# Patient Record
Sex: MALE | Race: WHITE | NOT HISPANIC OR LATINO | URBAN - METROPOLITAN AREA
[De-identification: names, ages, dates, MRNs, and addresses within clinical notes are randomized per-mention and may not be internally consistent; named-entity substitution may affect disease eponyms.]

---

## 2017-08-22 ENCOUNTER — INPATIENT (INPATIENT)
Facility: HOSPITAL | Age: 62
LOS: 3 days | Discharge: HOME | End: 2017-08-26
Attending: INTERNAL MEDICINE | Admitting: FAMILY MEDICINE
Payer: MEDICARE

## 2017-08-22 VITALS
RESPIRATION RATE: 18 BRPM | TEMPERATURE: 98 F | DIASTOLIC BLOOD PRESSURE: 68 MMHG | OXYGEN SATURATION: 97 % | HEART RATE: 59 BPM | SYSTOLIC BLOOD PRESSURE: 161 MMHG

## 2017-08-22 DIAGNOSIS — F11.23 OPIOID DEPENDENCE WITH WITHDRAWAL: ICD-10-CM

## 2017-08-22 DIAGNOSIS — I27.2 OTHER SECONDARY PULMONARY HYPERTENSION: ICD-10-CM

## 2017-08-22 DIAGNOSIS — F41.3 OTHER MIXED ANXIETY DISORDERS: ICD-10-CM

## 2017-08-22 LAB
ALBUMIN SERPL ELPH-MCNC: 2.9 G/DL — LOW (ref 3.3–5)
ALP SERPL-CCNC: 85 U/L — SIGNIFICANT CHANGE UP (ref 40–120)
ALT FLD-CCNC: 27 U/L — SIGNIFICANT CHANGE UP (ref 12–78)
ANION GAP SERPL CALC-SCNC: 2 MMOL/L — LOW (ref 5–17)
APTT BLD: 32.3 SEC — SIGNIFICANT CHANGE UP (ref 27.5–37.4)
AST SERPL-CCNC: 14 U/L — LOW (ref 15–37)
BASE EXCESS BLDA CALC-SCNC: 11 MMOL/L — HIGH (ref -2–2)
BASOPHILS # BLD AUTO: 0.1 K/UL — SIGNIFICANT CHANGE UP (ref 0–0.2)
BASOPHILS NFR BLD AUTO: 0.9 % — SIGNIFICANT CHANGE UP (ref 0–2)
BILIRUB SERPL-MCNC: 0.3 MG/DL — SIGNIFICANT CHANGE UP (ref 0.2–1.2)
BLOOD GAS COMMENTS ARTERIAL: SIGNIFICANT CHANGE UP
BUN SERPL-MCNC: 16 MG/DL — SIGNIFICANT CHANGE UP (ref 7–23)
CALCIUM SERPL-MCNC: 9.1 MG/DL — SIGNIFICANT CHANGE UP (ref 8.5–10.1)
CHLORIDE SERPL-SCNC: 98 MMOL/L — SIGNIFICANT CHANGE UP (ref 96–108)
CO2 SERPL-SCNC: 40 MMOL/L — HIGH (ref 22–31)
CREAT SERPL-MCNC: 0.47 MG/DL — LOW (ref 0.5–1.3)
D DIMER BLD IA.RAPID-MCNC: 231 NG/ML DDU — HIGH
EOSINOPHIL # BLD AUTO: 0.2 K/UL — SIGNIFICANT CHANGE UP (ref 0–0.5)
EOSINOPHIL NFR BLD AUTO: 2.4 % — SIGNIFICANT CHANGE UP (ref 0–6)
GLUCOSE SERPL-MCNC: 99 MG/DL — SIGNIFICANT CHANGE UP (ref 70–99)
HCO3 BLDA-SCNC: 38 MMOL/L — HIGH (ref 21–29)
HCT VFR BLD CALC: 29.7 % — LOW (ref 39–50)
HGB BLD-MCNC: 9.4 G/DL — LOW (ref 13–17)
INR BLD: 1.02 RATIO — SIGNIFICANT CHANGE UP (ref 0.88–1.16)
LYMPHOCYTES # BLD AUTO: 0.9 K/UL — LOW (ref 1–3.3)
LYMPHOCYTES # BLD AUTO: 13.3 % — SIGNIFICANT CHANGE UP (ref 13–44)
MCHC RBC-ENTMCNC: 26.4 PG — LOW (ref 27–34)
MCHC RBC-ENTMCNC: 31.8 GM/DL — LOW (ref 32–36)
MCV RBC AUTO: 83 FL — SIGNIFICANT CHANGE UP (ref 80–100)
MONOCYTES # BLD AUTO: 0.4 K/UL — SIGNIFICANT CHANGE UP (ref 0–0.9)
MONOCYTES NFR BLD AUTO: 5.3 % — SIGNIFICANT CHANGE UP (ref 2–14)
NEUTROPHILS # BLD AUTO: 5.4 K/UL — SIGNIFICANT CHANGE UP (ref 1.8–7.4)
NEUTROPHILS NFR BLD AUTO: 78.1 % — HIGH (ref 43–77)
PCO2 BLDA: 69 MMHG — HIGH (ref 32–46)
PH BLDA: 7.36 — SIGNIFICANT CHANGE UP (ref 7.35–7.45)
PLATELET # BLD AUTO: 158 K/UL — SIGNIFICANT CHANGE UP (ref 150–400)
PO2 BLDA: 77 — SIGNIFICANT CHANGE UP
POTASSIUM SERPL-MCNC: 3.8 MMOL/L — SIGNIFICANT CHANGE UP (ref 3.5–5.3)
POTASSIUM SERPL-SCNC: 3.8 MMOL/L — SIGNIFICANT CHANGE UP (ref 3.5–5.3)
PROT SERPL-MCNC: 6.4 GM/DL — SIGNIFICANT CHANGE UP (ref 6–8.3)
PROTHROM AB SERPL-ACNC: 11 SEC — SIGNIFICANT CHANGE UP (ref 9.8–12.7)
RBC # BLD: 3.58 M/UL — LOW (ref 4.2–5.8)
RBC # FLD: 14.4 % — SIGNIFICANT CHANGE UP (ref 10.3–14.5)
SAO2 % BLDA: 95 — SIGNIFICANT CHANGE UP
SODIUM SERPL-SCNC: 140 MMOL/L — SIGNIFICANT CHANGE UP (ref 135–145)
TROPONIN I SERPL-MCNC: <0.015 NG/ML — SIGNIFICANT CHANGE UP (ref 0.01–0.04)
WBC # BLD: 6.9 K/UL — SIGNIFICANT CHANGE UP (ref 3.8–10.5)
WBC # FLD AUTO: 6.9 K/UL — SIGNIFICANT CHANGE UP (ref 3.8–10.5)

## 2017-08-22 PROCEDURE — 99285 EMERGENCY DEPT VISIT HI MDM: CPT

## 2017-08-22 PROCEDURE — 71275 CT ANGIOGRAPHY CHEST: CPT | Mod: 26

## 2017-08-22 PROCEDURE — 71010: CPT | Mod: 26

## 2017-08-22 PROCEDURE — 93970 EXTREMITY STUDY: CPT | Mod: 26

## 2017-08-22 PROCEDURE — 93010 ELECTROCARDIOGRAM REPORT: CPT

## 2017-08-22 RX ORDER — FUROSEMIDE 40 MG
40 TABLET ORAL DAILY
Qty: 0 | Refills: 0 | Status: DISCONTINUED | OUTPATIENT
Start: 2017-08-22 | End: 2017-08-26

## 2017-08-22 RX ORDER — SENNA PLUS 8.6 MG/1
2 TABLET ORAL AT BEDTIME
Qty: 0 | Refills: 0 | Status: DISCONTINUED | OUTPATIENT
Start: 2017-08-22 | End: 2017-08-26

## 2017-08-22 RX ORDER — ASPIRIN/CALCIUM CARB/MAGNESIUM 324 MG
1 TABLET ORAL
Qty: 0 | Refills: 0 | COMMUNITY

## 2017-08-22 RX ORDER — SERTRALINE 25 MG/1
100 TABLET, FILM COATED ORAL
Qty: 0 | Refills: 0 | Status: DISCONTINUED | OUTPATIENT
Start: 2017-08-22 | End: 2017-08-26

## 2017-08-22 RX ORDER — FOLIC ACID 0.8 MG
1 TABLET ORAL
Qty: 0 | Refills: 0 | COMMUNITY

## 2017-08-22 RX ORDER — IPRATROPIUM/ALBUTEROL SULFATE 18-103MCG
3 AEROSOL WITH ADAPTER (GRAM) INHALATION EVERY 6 HOURS
Qty: 0 | Refills: 0 | Status: DISCONTINUED | OUTPATIENT
Start: 2017-08-22 | End: 2017-08-26

## 2017-08-22 RX ORDER — SODIUM CHLORIDE 9 MG/ML
3 INJECTION INTRAMUSCULAR; INTRAVENOUS; SUBCUTANEOUS ONCE
Qty: 0 | Refills: 0 | Status: COMPLETED | OUTPATIENT
Start: 2017-08-22 | End: 2017-08-22

## 2017-08-22 RX ORDER — ACETAMINOPHEN 500 MG
650 TABLET ORAL EVERY 6 HOURS
Qty: 0 | Refills: 0 | Status: DISCONTINUED | OUTPATIENT
Start: 2017-08-22 | End: 2017-08-26

## 2017-08-22 RX ORDER — MORPHINE SULFATE 50 MG/1
75 CAPSULE, EXTENDED RELEASE ORAL ONCE
Qty: 0 | Refills: 0 | Status: DISCONTINUED | OUTPATIENT
Start: 2017-08-22 | End: 2017-08-22

## 2017-08-22 RX ORDER — LOSARTAN POTASSIUM 100 MG/1
25 TABLET, FILM COATED ORAL DAILY
Qty: 0 | Refills: 0 | Status: DISCONTINUED | OUTPATIENT
Start: 2017-08-22 | End: 2017-08-26

## 2017-08-22 RX ORDER — SERTRALINE 25 MG/1
1 TABLET, FILM COATED ORAL
Qty: 60 | Refills: 0 | COMMUNITY

## 2017-08-22 RX ORDER — ASPIRIN/CALCIUM CARB/MAGNESIUM 324 MG
81 TABLET ORAL DAILY
Qty: 0 | Refills: 0 | Status: DISCONTINUED | OUTPATIENT
Start: 2017-08-23 | End: 2017-08-26

## 2017-08-22 RX ORDER — ASPIRIN/CALCIUM CARB/MAGNESIUM 324 MG
325 TABLET ORAL DAILY
Qty: 0 | Refills: 0 | Status: DISCONTINUED | OUTPATIENT
Start: 2017-08-22 | End: 2017-08-22

## 2017-08-22 RX ORDER — ASPIRIN/CALCIUM CARB/MAGNESIUM 324 MG
243 TABLET ORAL ONCE
Qty: 0 | Refills: 0 | Status: COMPLETED | OUTPATIENT
Start: 2017-08-22 | End: 2017-08-22

## 2017-08-22 RX ORDER — DOCUSATE SODIUM 100 MG
100 CAPSULE ORAL THREE TIMES A DAY
Qty: 0 | Refills: 0 | Status: DISCONTINUED | OUTPATIENT
Start: 2017-08-22 | End: 2017-08-26

## 2017-08-22 RX ORDER — MORPHINE SULFATE 50 MG/1
75 CAPSULE, EXTENDED RELEASE ORAL THREE TIMES A DAY
Qty: 0 | Refills: 0 | Status: DISCONTINUED | OUTPATIENT
Start: 2017-08-22 | End: 2017-08-26

## 2017-08-22 RX ORDER — IPRATROPIUM/ALBUTEROL SULFATE 18-103MCG
3 AEROSOL WITH ADAPTER (GRAM) INHALATION ONCE
Qty: 0 | Refills: 0 | Status: COMPLETED | OUTPATIENT
Start: 2017-08-22 | End: 2017-08-22

## 2017-08-22 RX ORDER — ALPRAZOLAM 0.25 MG
0.25 TABLET ORAL THREE TIMES A DAY
Qty: 0 | Refills: 0 | Status: DISCONTINUED | OUTPATIENT
Start: 2017-08-22 | End: 2017-08-26

## 2017-08-22 RX ORDER — ALBUTEROL 90 UG/1
2.5 AEROSOL, METERED ORAL
Qty: 0 | Refills: 0 | Status: DISCONTINUED | OUTPATIENT
Start: 2017-08-22 | End: 2017-08-26

## 2017-08-22 RX ORDER — ENOXAPARIN SODIUM 100 MG/ML
40 INJECTION SUBCUTANEOUS EVERY 24 HOURS
Qty: 0 | Refills: 0 | Status: DISCONTINUED | OUTPATIENT
Start: 2017-08-22 | End: 2017-08-26

## 2017-08-22 RX ORDER — FOLIC ACID 0.8 MG
1 TABLET ORAL DAILY
Qty: 0 | Refills: 0 | Status: DISCONTINUED | OUTPATIENT
Start: 2017-08-22 | End: 2017-08-26

## 2017-08-22 RX ORDER — ASPIRIN/CALCIUM CARB/MAGNESIUM 324 MG
243 TABLET ORAL ONCE
Qty: 0 | Refills: 0 | Status: DISCONTINUED | OUTPATIENT
Start: 2017-08-22 | End: 2017-08-22

## 2017-08-22 RX ORDER — MONTELUKAST 4 MG/1
10 TABLET, CHEWABLE ORAL AT BEDTIME
Qty: 0 | Refills: 0 | Status: DISCONTINUED | OUTPATIENT
Start: 2017-08-22 | End: 2017-08-26

## 2017-08-22 RX ADMIN — SERTRALINE 100 MILLIGRAM(S): 25 TABLET, FILM COATED ORAL at 17:14

## 2017-08-22 RX ADMIN — LOSARTAN POTASSIUM 25 MILLIGRAM(S): 100 TABLET, FILM COATED ORAL at 15:41

## 2017-08-22 RX ADMIN — Medication 0.25 MILLIGRAM(S): at 15:41

## 2017-08-22 RX ADMIN — MORPHINE SULFATE 75 MILLIGRAM(S): 50 CAPSULE, EXTENDED RELEASE ORAL at 23:15

## 2017-08-22 RX ADMIN — Medication 1 MILLIGRAM(S): at 15:41

## 2017-08-22 RX ADMIN — Medication 3 MILLILITER(S): at 11:51

## 2017-08-22 RX ADMIN — ENOXAPARIN SODIUM 40 MILLIGRAM(S): 100 INJECTION SUBCUTANEOUS at 15:41

## 2017-08-22 RX ADMIN — Medication 40 MILLIGRAM(S): at 15:41

## 2017-08-22 RX ADMIN — MORPHINE SULFATE 75 MILLIGRAM(S): 50 CAPSULE, EXTENDED RELEASE ORAL at 15:41

## 2017-08-22 RX ADMIN — SODIUM CHLORIDE 3 MILLILITER(S): 9 INJECTION INTRAMUSCULAR; INTRAVENOUS; SUBCUTANEOUS at 09:51

## 2017-08-22 RX ADMIN — Medication 243 MILLIGRAM(S): at 11:51

## 2017-08-22 RX ADMIN — MONTELUKAST 10 MILLIGRAM(S): 4 TABLET, CHEWABLE ORAL at 22:45

## 2017-08-22 RX ADMIN — MORPHINE SULFATE 75 MILLIGRAM(S): 50 CAPSULE, EXTENDED RELEASE ORAL at 22:45

## 2017-08-22 RX ADMIN — Medication 125 MILLIGRAM(S): at 11:51

## 2017-08-22 NOTE — BEHAVIORAL HEALTH ASSESSMENT NOTE - NSBHSUICRISKFACTOR_PSY_A_CORE
Agitation/severe anxiety/Substance abuse/dependence/Chronic pain or acute medical issue/Access to means (pills, firearms, etc.)

## 2017-08-22 NOTE — ED PROVIDER NOTE - PMH
Asthma    Chronic back pain greater than 3 months duration    HTN (hypertension)    Neuritis    GERALD (obstructive sleep apnea)    Osteoarthritis

## 2017-08-22 NOTE — BEHAVIORAL HEALTH ASSESSMENT NOTE - AXIS III
SOB, Chronic back pain, with Laminectomy >10 yrs ago, residual leg pain, disabled (back pain) former desk job , Obesity, GERALD (noncompliant with cpap), Asthma, bilateral THR

## 2017-08-22 NOTE — ED ADULT NURSE NOTE - FALL HARM RISK
coagulation(Bleeding disorder R/T clinical cond/anti-coags) bones(Osteoporosis,prev fx,steroid use,metastatic bone ca

## 2017-08-22 NOTE — ED PROVIDER NOTE - OBJECTIVE STATEMENT
61M HTN, ASA81 asthma/GERALD on home 02 3L. chronic lbp sp left spinal cord stimulator on chronic opioid therapy c/o worsening dyspnea x 3 days and one episode of sscp on getting into car this am. Resolved with rest. No cp currently. Neded 4L during cp. pain was non radiating. aw sob no diaphoresis. BL le edema is at baseline. No fevers or chills. Other than episode pt has had no increased o2 requirements. Pt sleeps in recliner chronically. No PND. Pt states he has felt wheezing but states it is more typical of his panic attacks than his asthma symptoms.   Son at bedside endorses daily panic attacks requiring xanax.   · Primary Care Provider	Dr. Mick Griggs (PMD)  · Care Providers for Follow up (PCP/Outpatient Provider)	Dr. Lambert (pulm)  [facility].    MEDS ASA, morphine, furosemide 40mg twice a day (unchanged), Montelukast, Rescue inhaler (pt hasn't used in 1 year). 61M HTN, ASA81 asthma/GERALD on home 02 3L. chronic lbp sp left spinal cord stimulator on chronic opioid therapy c/o worsening dyspnea x 3 days and one episode of sscp on getting into car this am. Resolved with rest. No cp currently. Neded 4L during cp. pain was non radiating. aw sob no diaphoresis. BL le edema is at baseline. No fevers or chills. Other than episode pt has had no increased o2 requirements. Pt sleeps in recliner chronically. No PND. Pt states he has felt wheezing but states it is more typical of his panic attacks than his asthma symptoms.   Son at bedside endorses daily panic attacks requiring xanax.   · Primary Care Provider	Dr. Mick Griggs (PMD)  · Care Providers for Follow up (PCP/Outpatient Provider)	[place]  [facility].    MEDS ASA, morphine, furosemide 40mg twice a day (unchanged), Montelukast, Rescue inhaler (pt hasn't used in 1 year).

## 2017-08-22 NOTE — H&P ADULT - HISTORY OF PRESENT ILLNESS
61M.  hx chronic LBP due to OA-has been opioid requiring > 10 years.  usual state of health until 1 week ago.  according to patient, Dr. Crocker had tapered his MS Contin 100mg po tid to 60mg po tid.  since, he has become increasingly anxious.  3 days ago, patient developed a cough, jittery, severe anxiety and shortness of breath which has progressed.  This AM, patient had chest pain was was brought to the ED by his two sons.  in ED, was given BD nebs and SoluMedrol.    ROS:  (+) anxiety.  (+) cough-dry and nonproductive.  (-) chest pain at current time.  (-) fever, chills or rigors.  (+) LUTS.  (-) melena, hematochezia, hemoptemesis.  (-) hematuria.  (-) hemoptysis.    PMHx:  hypoxia respiratory failure-O2 dependence;  GERALD;  asthma;  HTN;  "chest congestion and leg swelling" w/o cardiac w/u (denies stress test, angiogram, hx of MI, stents or CABG);  chronic LBP due to severe OA-malfunctioning spinal cord stimulator.    PSHx:  AP;  b/l hip replacement;  lamenectomy.    Rx:  reveiwed.    SocHx:  lives in the community.   (lost his wife ~1 year ago).  no tobacco.  no current EtOH.    FHx:  no 1st degree relative w/ CAD.

## 2017-08-22 NOTE — BEHAVIORAL HEALTH ASSESSMENT NOTE - NSBHCONSULTFOLLOWAFTERCARE_PSY_A_CORE FT
refer to an out pt psych provider via provider list vs [facility] [phone], in addition to bereavement support group

## 2017-08-22 NOTE — ED ADULT NURSE REASSESSMENT NOTE - NS ED NURSE REASSESS COMMENT FT1
pt remains as previously assessed. VS as charted. Report given to JOYA Martinez. Will transport patient with monitor; myself at bedside. Safety and comfort maintained.

## 2017-08-22 NOTE — ED PROVIDER NOTE - ATTENDING CONTRIBUTION TO CARE
Dr. Mustafa: I have personally performed a face to face bedside history and physical examination of this patient. I have discussed the history, examination, review of systems, assessment and plan of management with the resident. I have reviewed the electronic medical record and amended it to reflect my history, review of systems, physical exam, assessment and plan.

## 2017-08-22 NOTE — BEHAVIORAL HEALTH ASSESSMENT NOTE - HPI (INCLUDE ILLNESS QUALITY, SEVERITY, DURATION, TIMING, CONTEXT, MODIFYING FACTORS, ASSOCIATED SIGNS AND SYMPTOMS)
61 yowwm, no formal PPH, h/o Alcohol dependence in remission > 25 yrs after detox, but prescribed Zoloft > 15 yrs ago currently dose is at 100 mg bid, does not remember why initiated, no h/o psych eval, treatment, therapy or SA,  PMH Chronic back pain, with Laminectomy >10 yrs ago, residual leg pain, disabled (back pain) former desk job , Obesity, GERALD (noncompliant with cpap), Asthma, bilateral THR, bib son for SOB, psych referral for anxiety.    Pt reports "panic attack" prior to admission to hospital, triggered by inability to get into jeep on way to hospital, denies depression, endorses sadness and grief since losing  wife Dec., 2016,  report requesting a decrease of pain meds due to feeling overmedicated and wanting quality time with family, prescribed Xanax June 2016 0.25mg prn , but admits to taking regularly bid and recently 3x day.  Pain MD tapered his Ms Contin from 100 mg tid to 60 tid, with reported increased anxiety.    denies vegetative symptoms of depression, some sleep impairment 2ndary to GERALD.  Mood anxious, speech wnl soft, easily engages, denies SIIP, HIIP, psychosis or lacie.

## 2017-08-22 NOTE — BEHAVIORAL HEALTH ASSESSMENT NOTE - NSBHCHARTREVIEWLAB_PSY_A_CORE FT
9.4    6.9   )-----------( 158      ( 22 Aug 2017 10:26 )             29.7   08-22    140  |  98  |  16  ----------------------------<  99  3.8   |  40<H>  |  0.47<L>    Ca    9.1      22 Aug 2017 10:26    TPro  6.4  /  Alb  2.9<L>  /  TBili  0.3  /  DBili  x   /  AST  14<L>  /  ALT  27  /  AlkPhos  85  08-22

## 2017-08-22 NOTE — ED PROVIDER NOTE - MEDICAL DECISION MAKING DETAILS
Chest pain consistent with ACS vs asthma exacerabation and CAD risk factors. Differential also includes obesity hypoventilation syndrome. Pt currently without altered mental status.   1) serial EKGs/CXR/ASA/O2/cardiac monitor/LABS/   2) reassess  3) Admit to telemetry

## 2017-08-22 NOTE — BEHAVIORAL HEALTH ASSESSMENT NOTE - NSBHREFERDETAILS_PSY_A_CORE_FT
61M.  hx chronic LBP due to OA-has been opioid requiring > 10 years.  usual state of health until 1 week ago.  according to patient, Dr. Crocker had tapered his MS Contin 100mg po tid to 60mg po tid.  since, he has become increasingly anxious.  3 days ago, patient developed a cough, jittery, severe anxiety and shortness of breath which has progressed.  This AM, patient had chest pain was was brought to the ED by his two sons.  in ED, was given BD nebs and SoluMedrol.   Psych eval requested due to increasing anxiety.  Pt takes Zoloft 100 mg bid for > 15yrs by PCP.

## 2017-08-22 NOTE — ED PROVIDER NOTE - NS ED ROS FT
No fever/chills, no change in vision, no throat pain, + sob, +cp see hpi, chronic leg swelling or calf pain, no orthopnea, no pnd, no decrease exersized tolerance, no recent travel, no cancer hx, no prior hx of dvt/blood clot,  no abdominal pain, no nausea/vomiting,  no dysuria, no joint pain, no rashes, no focal numbness or weakness, no known mental health issues

## 2017-08-22 NOTE — BEHAVIORAL HEALTH ASSESSMENT NOTE - SUMMARY
61 yowwm, PPH alcohol dependence in remission 25 yrs, Anxiety, Zoloft 100 bid> 10yrs, Xanax 0.25 bid prn, takes 2-3x day, bib son for SOB, dyspnea, which Pt refers to as Panic attack.  Pt reports Opiates tapered down at Pt request, claiming he wanted to be more present with family, less medicated, endorses using prn meds regularly up to 3x day recently, no psych provider or therapy to date.  Pt present with rebound anxiety in context of meds adjustment when Opiates adjusted.  Recommend slower taper, preferable by psych MD, or via inpt detox  with addiction specialist if Pt agreeable.  Difficult balance of pain management with substance abuse history.  Not medication adjustment recommended at this time, suggest out Pt follow up for psych and grief support group via insurance provider list vs [facility] [phone].  Pt is not an imminent danger to self or others and is cleared psychiatrically for discharge.  Case reviewed with Dr De La Torre.

## 2017-08-22 NOTE — H&P ADULT - ASSESSMENT
shortness of breath and anxiety.  hx panic attacks.  -favor opioid withdrawel syndrome due to recent decrease in MS Contin.  -check urine toxicology.  -increase MS Contin 75mg po tid.  -c/w Zoloft and Xanax.  -Psychiatry consult.    hypoxia.  hx GERALD and chronic hypoxic respiratory failure.  hx asthma.  -CXR (-) acute findings.  -ABG.  -D-dimer.  -b/l LE venous dopplers, r/o DVT.  -CTA.  -TTE r/o pHTN.  -has not tolerated BiPAP in the past.  -DUO nebs q6h + DELMI q3h PRN.  -Pulmonary consult.    chest pain.  -telemetry.  -TnI (-).  -EKG (-) acute changes.  -ASA.  -Cardiology consult.    normocytic anemia.  -Fe studies.  -B12.  folate.  -reticulocyte coung.  -stool OB.  -f/u AM CBC.    hx chest congestion and LE edema.  -BNP wnl.  -TTE.  -c/w Lasix.    hx morbid obesity.  -A1C.  -TSH.  -lifestyle modifications.    disposition.  -telemetry unit. shortness of breath and anxiety.  hx panic attacks.  -favor opioid withdrawel syndrome due to recent decrease in MS Contin.  -check urine toxicology.  -increase MS Contin 75mg po tid.  -c/w Zoloft and Xanax.  -awaiting call back from Dr. Antonio Crocker.  message left w/ office staff.  call back requested.  -Psychiatry consult.    hypoxia.  hx GERALD and chronic hypoxic respiratory failure.  hx asthma.  -CXR (-) acute findings.  -ABG.  -D-dimer.  -b/l LE venous dopplers, r/o DVT.  -CTA.  -TTE r/o pHTN.  -has not tolerated BiPAP in the past.  -DUO nebs q6h + DELMI q3h PRN.  -Pulmonary consult.    chest pain.  -telemetry.  -TnI (-).  -EKG (-) acute changes.  -ASA.  -Cardiology consult.    normocytic anemia.  -Fe studies.  -B12.  folate.  -reticulocyte coung.  -stool OB.  -f/u AM CBC.    hx chest congestion and LE edema.  -BNP wnl.  -TTE.  -c/w Lasix.    hx morbid obesity.  -A1C.  -TSH.  -lifestyle modifications.    disposition.  -telemetry unit.

## 2017-08-22 NOTE — BEHAVIORAL HEALTH ASSESSMENT NOTE - NSBHCHARTREVIEWVS_PSY_A_CORE FT
ICU Vital Signs Last 24 Hrs  T(C): 36.7 (22 Aug 2017 18:15), Max: 36.7 (22 Aug 2017 09:49)  T(F): 98.1 (22 Aug 2017 18:15), Max: 98.1 (22 Aug 2017 18:15)  HR: 57 (22 Aug 2017 18:15) (57 - 59)  BP: 145/63 (22 Aug 2017 18:15) (145/63 - 161/68)  BP(mean): 99 (22 Aug 2017 14:38) (99 - 99)  ABP: --  ABP(mean): --  RR: 18 (22 Aug 2017 18:15) (18 - 18)  SpO2: 96% (22 Aug 2017 18:15) (96% - 97%)

## 2017-08-22 NOTE — BEHAVIORAL HEALTH ASSESSMENT NOTE - NSBHSUICPROTECTFACT_PSY_A_CORE
Identifies reasons for living/Supportive social network or family/Future oriented/Responsibility to family and others/Fear of death or dying due to pain/suffering

## 2017-08-23 LAB
AMPHET UR-MCNC: NEGATIVE — SIGNIFICANT CHANGE UP
ANION GAP SERPL CALC-SCNC: -1 MMOL/L — LOW (ref 5–17)
BARBITURATES UR SCN-MCNC: NEGATIVE — SIGNIFICANT CHANGE UP
BENZODIAZ UR-MCNC: POSITIVE — SIGNIFICANT CHANGE UP
BUN SERPL-MCNC: 14 MG/DL — SIGNIFICANT CHANGE UP (ref 7–23)
CALCIUM SERPL-MCNC: 9.1 MG/DL — SIGNIFICANT CHANGE UP (ref 8.5–10.1)
CHLORIDE SERPL-SCNC: 97 MMOL/L — SIGNIFICANT CHANGE UP (ref 96–108)
CO2 SERPL-SCNC: 44 MMOL/L — HIGH (ref 22–31)
COCAINE METAB.OTHER UR-MCNC: NEGATIVE — SIGNIFICANT CHANGE UP
CREAT SERPL-MCNC: 0.45 MG/DL — LOW (ref 0.5–1.3)
FERRITIN SERPL-MCNC: 66 NG/ML — SIGNIFICANT CHANGE UP (ref 30–400)
GLUCOSE SERPL-MCNC: 97 MG/DL — SIGNIFICANT CHANGE UP (ref 70–99)
HBA1C BLD-MCNC: 5.1 % — SIGNIFICANT CHANGE UP (ref 4–5.6)
HCT VFR BLD CALC: 31.2 % — LOW (ref 39–50)
HGB BLD-MCNC: 9.8 G/DL — LOW (ref 13–17)
IRON SATN MFR SERPL: 23 % — SIGNIFICANT CHANGE UP (ref 16–55)
IRON SATN MFR SERPL: 59 UG/DL — SIGNIFICANT CHANGE UP (ref 45–165)
MCHC RBC-ENTMCNC: 25.7 PG — LOW (ref 27–34)
MCHC RBC-ENTMCNC: 31.3 GM/DL — LOW (ref 32–36)
MCV RBC AUTO: 82 FL — SIGNIFICANT CHANGE UP (ref 80–100)
METHADONE UR-MCNC: NEGATIVE — SIGNIFICANT CHANGE UP
OPIATES UR-MCNC: POSITIVE — SIGNIFICANT CHANGE UP
PCP SPEC-MCNC: SIGNIFICANT CHANGE UP
PCP UR-MCNC: NEGATIVE — SIGNIFICANT CHANGE UP
PLATELET # BLD AUTO: 143 K/UL — LOW (ref 150–400)
POTASSIUM SERPL-MCNC: 3.5 MMOL/L — SIGNIFICANT CHANGE UP (ref 3.5–5.3)
POTASSIUM SERPL-SCNC: 3.5 MMOL/L — SIGNIFICANT CHANGE UP (ref 3.5–5.3)
RBC # BLD: 3.75 M/UL — LOW (ref 4.2–5.8)
RBC # BLD: 3.81 M/UL — LOW (ref 4.2–5.8)
RBC # FLD: 14.2 % — SIGNIFICANT CHANGE UP (ref 10.3–14.5)
RETICS #: 64.5 K/UL — SIGNIFICANT CHANGE UP (ref 25–125)
RETICS/RBC NFR: 1.7 % — SIGNIFICANT CHANGE UP (ref 0.5–2.5)
SODIUM SERPL-SCNC: 140 MMOL/L — SIGNIFICANT CHANGE UP (ref 135–145)
THC UR QL: NEGATIVE — SIGNIFICANT CHANGE UP
TIBC SERPL-MCNC: 257 UG/DL — SIGNIFICANT CHANGE UP (ref 220–430)
TSH SERPL-MCNC: 0.84 UIU/ML — SIGNIFICANT CHANGE UP (ref 0.36–3.74)
UIBC SERPL-MCNC: 198 UG/DL — SIGNIFICANT CHANGE UP (ref 110–370)
WBC # BLD: 7.7 K/UL — SIGNIFICANT CHANGE UP (ref 3.8–10.5)
WBC # FLD AUTO: 7.7 K/UL — SIGNIFICANT CHANGE UP (ref 3.8–10.5)

## 2017-08-23 PROCEDURE — 93306 TTE W/DOPPLER COMPLETE: CPT | Mod: 26

## 2017-08-23 RX ORDER — IPRATROPIUM/ALBUTEROL SULFATE 18-103MCG
3 AEROSOL WITH ADAPTER (GRAM) INHALATION ONCE
Qty: 0 | Refills: 0 | Status: COMPLETED | OUTPATIENT
Start: 2017-08-23 | End: 2017-08-23

## 2017-08-23 RX ADMIN — MORPHINE SULFATE 75 MILLIGRAM(S): 50 CAPSULE, EXTENDED RELEASE ORAL at 23:00

## 2017-08-23 RX ADMIN — SERTRALINE 100 MILLIGRAM(S): 25 TABLET, FILM COATED ORAL at 17:26

## 2017-08-23 RX ADMIN — Medication 40 MILLIGRAM(S): at 10:42

## 2017-08-23 RX ADMIN — Medication 1 MILLIGRAM(S): at 12:00

## 2017-08-23 RX ADMIN — Medication 3 MILLILITER(S): at 14:00

## 2017-08-23 RX ADMIN — Medication 81 MILLIGRAM(S): at 12:00

## 2017-08-23 RX ADMIN — Medication 0.25 MILLIGRAM(S): at 23:02

## 2017-08-23 RX ADMIN — Medication 3 MILLILITER(S): at 20:07

## 2017-08-23 RX ADMIN — Medication 0.25 MILLIGRAM(S): at 10:42

## 2017-08-23 RX ADMIN — MORPHINE SULFATE 75 MILLIGRAM(S): 50 CAPSULE, EXTENDED RELEASE ORAL at 14:23

## 2017-08-23 RX ADMIN — LOSARTAN POTASSIUM 25 MILLIGRAM(S): 100 TABLET, FILM COATED ORAL at 05:40

## 2017-08-23 RX ADMIN — SERTRALINE 100 MILLIGRAM(S): 25 TABLET, FILM COATED ORAL at 05:40

## 2017-08-23 RX ADMIN — MORPHINE SULFATE 75 MILLIGRAM(S): 50 CAPSULE, EXTENDED RELEASE ORAL at 05:39

## 2017-08-23 RX ADMIN — MORPHINE SULFATE 75 MILLIGRAM(S): 50 CAPSULE, EXTENDED RELEASE ORAL at 22:26

## 2017-08-23 RX ADMIN — MONTELUKAST 10 MILLIGRAM(S): 4 TABLET, CHEWABLE ORAL at 22:27

## 2017-08-23 RX ADMIN — Medication 100 MILLIGRAM(S): at 22:27

## 2017-08-23 RX ADMIN — Medication 3 MILLILITER(S): at 08:13

## 2017-08-23 RX ADMIN — ENOXAPARIN SODIUM 40 MILLIGRAM(S): 100 INJECTION SUBCUTANEOUS at 14:22

## 2017-08-23 NOTE — CONSULT NOTE ADULT - SUBJECTIVE AND OBJECTIVE BOX
Patient is a 61y old  Male who presents with a chief complaint of shortness of breath and anxiety. (22 Aug 2017 14:38)      HPI:  61M.  hx chronic LBP due to OA-has been opioid requiring > 10 years.  usual state of health until 1 week ago.  according to patient, Dr. Crocker had tapered his MS Contin 100mg po tid to 60mg po tid.  since, he has become increasingly anxious.  3 days ago, patient developed a cough, jittery, severe anxiety and shortness of breath which has progressed.  This AM, patient had chest pain was was brought to the ED by his two sons.  in ED, was given BD nebs and SoluMedrol.    pt is seen and examined on [facility] today  he feels anxious and typically declines any cpap or biapap setting or any form of masks    ROS:  (+) anxiety.  (+) cough-dry and nonproductive.  (-) chest pain at current time.  (-) fever, chills or rigors.  (+) LUTS.  (-) melena, hematochezia, hemoptemesis.  (-) hematuria.  (-) hemoptysis.    PMHx:  hypoxia respiratory failure-O2 dependence;  GERALD;  asthma;  HTN;  "chest congestion and leg swelling" w/o cardiac w/u (denies stress test, angiogram, hx of MI, stents or CABG);  chronic LBP due to severe OA-malfunctioning spinal cord stimulator.    PSHx:  AP;  b/l hip replacement;  lamenectomy.    Rx:  reveiwed.    SocHx:  lives in the community.   (lost his wife ~1 year ago).  no tobacco.  no current EtOH.    FHx:  no 1st degree relative w/ CAD. (22 Aug 2017 14:38)      PAST MEDICAL & SURGICAL HISTORY:  Neuritis  GERALD (obstructive sleep apnea)  Osteoarthritis  Chronic back pain greater than 3 months duration  Asthma  HTN (hypertension)  S/P hip replacement: bilateral  S/P spinal surgery  S/P appendectomy      PREVIOUS DIAGNOSTIC TESTING:      MEDICATIONS  (STANDING):  enoxaparin Injectable 40 milliGRAM(s) SubCutaneous every 24 hours  aspirin enteric coated 81 milliGRAM(s) Oral daily  docusate sodium 100 milliGRAM(s) Oral three times a day  ALBUTerol/ipratropium for Nebulization 3 milliLiter(s) Nebulizer every 6 hours  morphine ER Tablet 75 milliGRAM(s) Oral three times a day  losartan 25 milliGRAM(s) Oral daily  sertraline 100 milliGRAM(s) Oral two times a day  furosemide    Tablet 40 milliGRAM(s) Oral daily  montelukast 10 milliGRAM(s) Oral at bedtime  folic acid 1 milliGRAM(s) Oral daily    MEDICATIONS  (PRN):  acetaminophen   Tablet 650 milliGRAM(s) Oral every 6 hours PRN For Temp greater than 38.5 C (101.3 F)  acetaminophen   Tablet. 650 milliGRAM(s) Oral every 6 hours PRN Mild Pain (1 - 3)  senna 2 Tablet(s) Oral at bedtime PRN Constipation  ALBUTerol    0.083% 2.5 milliGRAM(s) Nebulizer every 3 hours PRN Shortness of Breath and/or Wheezing  ALPRAZolam 0.25 milliGRAM(s) Oral three times a day PRN anxiety.      FAMILY HISTORY:  No pertinent family history in first degree relatives        REVIEW OF SYSTEM:  Pertinent items are noted in HPI.  Constitutional negative for chills, fevers, sweats and weight loss  throat, and face:  negative for epistaxis,pos nasal congestion, sore throat and   tinnitus  Respiratory:pos for cough, dyspnea on exertion, no pleuritic chest pain  and wheezing  Cardiovascular:  negative for chest pain, dyspnea and palpitations  Gastrointestinal: negative for abdominal pain, diarrhea, nausea and vomiting  Genitourinary: negative for dysuria, frequency and urinary incontinence  Skin:  negative for redness, rash, pruritus, swelling, dryness and   fissures  Hematologic/lymphatic: negative for bleeding and easy bruising  Musculoskeletal: pos for arthralgias, back pain and muscle weakness  Neurological: negative for dizziness, headaches, seizures and tremors      Vital Signs Last 24 Hrs  T(C): 36.7 (23 Aug 2017 04:10), Max: 36.7 (22 Aug 2017 09:49)  T(F): 98 (23 Aug 2017 04:10), Max: 98.1 (22 Aug 2017 18:15)  HR: 57 (23 Aug 2017 08:05) (50 - 64)  BP: 160/71 (23 Aug 2017 05:37) (126/49 - 161/68)  BP(mean): 99 (22 Aug 2017 14:38) (99 - 99)  RR: 17 (23 Aug 2017 05:37) (17 - 18)  SpO2: 100% (23 Aug 2017 05:37) (93% - 100%)    I&O's Summary    22 Aug 2017 07:01  -  23 Aug 2017 07:00  --------------------------------------------------------  IN: 0 mL / OUT: 510 mL / NET: -510 mL      PHYSICAL EXAM  General Appearance: cooperative, no acute distress, obese with difficulty sitting up for exam in bed  HEENT: PERRL, conjunctiva clear, EOM's intact,  Neck: Supple, , no adenopathy, thyroid: not enlarged, no carotid bruit or JVD  Back: Symmetric, no  tenderness,no soft tissue tenderness  Lungs: decreased breath sounds in mid to lower lung fileds, no wheezing  Heart: Regular rate and rhythm, S1, S2 normal, no murmur, rub or gallop  Abdomen: Soft, non-tender, bowel sounds active , no hepatosplenomegaly  Extremities: no cyanosis but pos lower ext edema, no joint swelling  Skin: Skin color, texture normal, no rashes   Neurologic: Alert and oriented X3 , cranial nerves intact, sensory and motor normal,    ECG:    LABS:    Patient is a 61y old  Male who presents with a chief complaint of shortness of breath and anxiety. (22 Aug 2017 14:38)      HPI:  61M.  hx chronic LBP due to OA-has been opioid requiring > 10 years.  usual state of health until 1 week ago.  according to patient, Dr. Crocker had tapered his MS Contin 100mg po tid to 60mg po tid.  since, he has become increasingly anxious.  3 days ago, patient developed a cough, jittery, severe anxiety and shortness of breath which has progressed.  This AM, patient had chest pain was was brought to the ED by his two sons.  in ED, was given BD nebs and SoluMedrol.    ROS:  (+) anxiety.  (+) cough-dry and nonproductive.  (-) chest pain at current time.  (-) fever, chills or rigors.  (+) LUTS.  (-) melena, hematochezia, hemoptemesis.  (-) hematuria.  (-) hemoptysis.    PMHx:  hypoxia respiratory failure-O2 dependence;  GERALD;  asthma;  HTN;  "chest congestion and leg swelling" w/o cardiac w/u (denies stress test, angiogram, hx of MI, stents or CABG);  chronic LBP due to severe OA-malfunctioning spinal cord stimulator.    PSHx:  AP;  b/l hip replacement;  lamenectomy.    Rx:  reveiwed.    SocHx:  lives in the community.   (lost his wife ~1 year ago).  no tobacco.  no current EtOH.    FHx:  no 1st degree relative w/ CAD. (22 Aug 2017 14:38)      PAST MEDICAL & SURGICAL HISTORY:  Neuritis  GERALD (obstructive sleep apnea)  Osteoarthritis  Chronic back pain greater than 3 months duration  Asthma  HTN (hypertension)  S/P hip replacement: bilateral  S/P spinal surgery  S/P appendectomy      PREVIOUS DIAGNOSTIC TESTING:      MEDICATIONS  (STANDING):  enoxaparin Injectable 40 milliGRAM(s) SubCutaneous every 24 hours  aspirin enteric coated 81 milliGRAM(s) Oral daily  docusate sodium 100 milliGRAM(s) Oral three times a day  ALBUTerol/ipratropium for Nebulization 3 milliLiter(s) Nebulizer every 6 hours  morphine ER Tablet 75 milliGRAM(s) Oral three times a day  losartan 25 milliGRAM(s) Oral daily  sertraline 100 milliGRAM(s) Oral two times a day  furosemide    Tablet 40 milliGRAM(s) Oral daily  montelukast 10 milliGRAM(s) Oral at bedtime  folic acid 1 milliGRAM(s) Oral daily    MEDICATIONS  (PRN):  acetaminophen   Tablet 650 milliGRAM(s) Oral every 6 hours PRN For Temp greater than 38.5 C (101.3 F)  acetaminophen   Tablet. 650 milliGRAM(s) Oral every 6 hours PRN Mild Pain (1 - 3)  senna 2 Tablet(s) Oral at bedtime PRN Constipation  ALBUTerol    0.083% 2.5 milliGRAM(s) Nebulizer every 3 hours PRN Shortness of Breath and/or Wheezing  ALPRAZolam 0.25 milliGRAM(s) Oral three times a day PRN anxiety.      FAMILY HISTORY:  No pertinent family history in first degree relatives      SOCIAL HISTORY:  ***    REVIEW OF SYSTEM:  Pertinent items are noted in HPI.  Constitutional negative for chills, fevers, sweats and weight loss  throat, and face:  negative for epistaxis, nasal congestion, sore throat and   tinnitus  Respiratory: negative for cough, dyspnea on exertion, pleuritic chest pain  and wheezing  Cardiovascular:  negative for chest pain, dyspnea and palpitations  Gastrointestinal: negative for abdominal pain, diarrhea, nausea and vomiting  Genitourinary: negative for dysuria, frequency and urinary incontinence  Skin:  negative for redness, rash, pruritus, swelling, dryness and   fissures  Hematologic/lymphatic: negative for bleeding and easy bruising  Musculoskeletal: negative for arthralgias, back pain and muscle weakness  Neurological: negative for dizziness, headaches, seizures and tremors  Behavioral/Psych:  negative for mood change, depression, suicidal attempts    Allergic/Immunologic: negative for anaphylaxis, angioedema and urticaria    Vital Signs Last 24 Hrs  T(C): 36.7 (23 Aug 2017 04:10), Max: 36.7 (22 Aug 2017 09:49)  T(F): 98 (23 Aug 2017 04:10), Max: 98.1 (22 Aug 2017 18:15)  HR: 57 (23 Aug 2017 08:05) (50 - 64)  BP: 160/71 (23 Aug 2017 05:37) (126/49 - 161/68)  BP(mean): 99 (22 Aug 2017 14:38) (99 - 99)  RR: 17 (23 Aug 2017 05:37) (17 - 18)  SpO2: 100% (23 Aug 2017 05:37) (93% - 100%)    I&O's Summary    22 Aug 2017 07:01  -  23 Aug 2017 07:00  --------------------------------------------------------  IN: 0 mL / OUT: 510 mL / NET: -510 mL      PHYSICAL EXAM  General Appearance: cooperative, no acute distress,   HEENT: PERRL, conjunctiva clear, EOM's intact, non injected pharynx, no exudate, TM   normal  Neck: Supple, , no adenopathy, thyroid: not enlarged, no carotid bruit or JVD  Back: Symmetric, no  tenderness,no soft tissue tenderness  Lungs: Clear to auscultation bilateral,no adventitious breath sounds, normal   expiratory phase  Heart: Regular rate and rhythm, S1, S2 normal, no murmur, rub or gallop  Abdomen: Soft, non-tender, bowel sounds active , no hepatosplenomegaly  Extremities: no cyanosis or edema, no joint swelling  Skin: Skin color, texture normal, no rashes   Neurologic: Alert and oriented X3 , cranial nerves intact, sensory and motor normal,    ECG:    LABS:                          9.8    7.7   )-----------( 143      ( 23 Aug 2017 06:44 )             31.2     08-23    140  |  97  |  14  ----------------------------<  97  3.5   |  44<H>  |  0.45<L>    Ca    9.1      23 Aug 2017 06:44    TPro  6.4  /  Alb  2.9<L>  /  TBili  0.3  /  DBili  x   /  AST  14<L>  /  ALT  27  /  AlkPhos  85  08-22    CARDIAC MARKERS ( 22 Aug 2017 16:38 )  <0.015 ng/mL / x     / x     / x     / x      CARDIAC MARKERS ( 22 Aug 2017 13:14 )  <0.015 ng/mL / x     / x     / x     / x      CARDIAC MARKERS ( 22 Aug 2017 10:26 )  <0.015 ng/mL / x     / x     / x     / x            Pro BNP  -- 08-22 @ 16:38  D Dimer  231 08-22 @ 16:38  Pro BNP  124 08-22 @ 10:26  D Dimer  -- 08-22 @ 10:26    PT/INR - ( 22 Aug 2017 10:26 )   PT: 11.0 sec;   INR: 1.02 ratio         PTT - ( 22 Aug 2017 10:26 )  PTT:32.3 sec    ABG - ( 22 Aug 2017 18:37 )  pH: 7.36  /  pCO2: 69    /  pO2: 77    / HCO3: 38    / Base Excess: 11    /  SaO2: 95                < from: CT Angio Chest PE Protocol w/ IV Cont (08.22.17 @ 15:13) >  INTERPRETATION:  EXAMINATION DATE: August 22, 2017 at 1509 hours.  CLINICAL INFORMATION: Shortness of breath.    COMPARISON: None.    PROCEDURE:   CT Angiography of the Chest.  Intravenous contrast: 90 mL Omnipaque 350. 10 mL discarded.  Sagittal and coronal reformats were performed as well as MIPS.    FINDINGS:    LUNGS AND LARGE AIRWAYS: Patent central airways. No pulmonary nodules.  PLEURA: No pleural effusion.  VESSELS: Poor contrast opacification of the pulmonary arteries which are   only visualized to the level of the central main and lobar arteries. No   central pulmonary embolus.   HEART: The heart is enlarged. No pericardial effusion.  MEDIASTINUM AND CAMILLE: No lymphadenopathy.  CHEST WALL AND LOWER NECK: Within normal limits.  VISUALIZED UPPER ABDOMEN: Within normal limits.  BONES: Degenerative changes of the spine.    IMPRESSION: For contrast opacification of the pulmonary arteries. No   large or central pulmonary embolism.    < end of copied text >      RADIOLOGY & ADDITIONAL STUDIES:    ABG - ( 22 Aug 2017 18:37 )  pH: 7.36  /  pCO2: 69    /  pO2: 77    / HCO3: 38    / Base Excess: 11    /  SaO2: 95                                        9.8    7.7   )-----------( 143      ( 23 Aug 2017 06:44 )             31.2     08-23    140  |  97  |  14  ----------------------------<  97  3.5   |  44<H>  |  0.45<L>    Ca    9.1      23 Aug 2017 06:44    TPro  6.4  /  Alb  2.9<L>  /  TBili  0.3  /  DBili  x   /  AST  14<L>  /  ALT  27  /  AlkPhos  85  08-22    CARDIAC MARKERS ( 22 Aug 2017 16:38 )  <0.015 ng/mL / x     / x     / x     / x      CARDIAC MARKERS ( 22 Aug 2017 13:14 )  <0.015 ng/mL / x     / x     / x     / x      CARDIAC MARKERS ( 22 Aug 2017 10:26 )  <0.015 ng/mL / x     / x     / x     / x            Pro BNP  -- 08-22 @ 16:38  D Dimer  231 08-22 @ 16:38  Pro BNP  124 08-22 @ 10:26  D Dimer  -- 08-22 @ 10:26    PT/INR - ( 22 Aug 2017 10:26 )   PT: 11.0 sec;   INR: 1.02 ratio         PTT - ( 22 Aug 2017 10:26 )  PTT:32.3 sec    ABG - ( 22 Aug 2017 18:37 )  pH: 7.36  /  pCO2: 69    /  pO2: 77    / HCO3: 38    / Base Excess: 11    /  SaO2: 95                    RADIOLOGY & ADDITIONAL STUDIES:

## 2017-08-23 NOTE — CONSULT NOTE ADULT - ASSESSMENT
61M.  hx chronic LBP due to OA-has been opioid requiring > 10 years.  usual state of health until 1 week ago.  according to patient, Dr. Crocker had tapered his MS Contin 100mg po tid to 60mg po tid.  since, he has become increasingly anxious.  3 days ago, patient developed a cough, jittery, severe anxiety and shortness of breath which has progressed.  This AM, patient had chest pain was was brought to the ED by his two sons.  in ED, was given BD nebs and SoluMedrol.    GERALD  Obesity Hypoventilation syndrome  Chronic hypercapnic / hypoxemic resp failure  Chrnic pain syndrome on narcotics also cont factor  Rest pulm dysfunction  COPD without active bronchospasm  recent initiation of supplmeneatl o2  eval for CHF and pulm HTN      plan  trial BIPAP but likley unsuccesful with prior hx  ECHO  cardiac eval  walk with o2  DUOPNEB  supplemental o2 needed  incentive spirometry  overall prognosis remains guarded  needs WT loss

## 2017-08-23 NOTE — CONSULT NOTE ADULT - SUBJECTIVE AND OBJECTIVE BOX
Patient is a 61y old  Male who presents with a chief complaint of shortness of breath.       HPI:  61M.  hx chronic Low back pain  due to OA-has been opioid requiring > 10 years.  usual state of health until 1 week ago.  according  to patient, He had tapered his MS Contin 100mg po tid to 60mg po tid.  since, he has become increasingly anxious.  3 days ago, patient developed a cough, jittery, severe anxiety and shortness of breath which has progressed.  Patient states prior to tapering his pain medication, he gets short of breath with mild exertion and he gets tired easily. He states he cannot function because of his fatigue and shortness of breath. On the day of admission he states he had chest tightness, this pain had lasted for approximately 10 minutes and resolved. He states at times he becomes diaphoretic. He denies any recent travel or long drive. After admission a CT of the chest with contrast was reported to be normal. Patient now is comfortable at rest, but he states if he moves around he gets short of breath. He also has a history of asthma. He denies any cough or wheezing. Because of chronic low back pain and hip pain he has a spinal cord stimulator.        PAST MEDICAL & SURGICAL HISTORY:  Neuritis  GERALD (obstructive sleep apnea)  Osteoarthritis  Chronic back pain greater than 3 months duration  Asthma  HTN (hypertension)  S/P hip replacement: bilateral  S/P spinal surgery  S/P appendectomy        MEDICATIONS  (STANDING):  enoxaparin Injectable 40 milliGRAM(s) SubCutaneous every 24 hours  aspirin enteric coated 81 milliGRAM(s) Oral daily  docusate sodium 100 milliGRAM(s) Oral three times a day  ALBUTerol/ipratropium for Nebulization 3 milliLiter(s) Nebulizer every 6 hours  morphine ER Tablet 75 milliGRAM(s) Oral three times a day  losartan 25 milliGRAM(s) Oral daily  sertraline 100 milliGRAM(s) Oral two times a day  furosemide    Tablet 40 milliGRAM(s) Oral daily  montelukast 10 milliGRAM(s) Oral at bedtime  folic acid 1 milliGRAM(s) Oral daily  ALBUTerol/ipratropium for Nebulization. 3 milliLiter(s) Nebulizer once    MEDICATIONS  (PRN):  acetaminophen   Tablet 650 milliGRAM(s) Oral every 6 hours PRN For Temp greater than 38.5 C (101.3 F)  acetaminophen   Tablet. 650 milliGRAM(s) Oral every 6 hours PRN Mild Pain (1 - 3)  senna 2 Tablet(s) Oral at bedtime PRN Constipation  ALBUTerol    0.083% 2.5 milliGRAM(s) Nebulizer every 3 hours PRN Shortness of Breath and/or Wheezing  ALPRAZolam 0.25 milliGRAM(s) Oral three times a day PRN anxiety.      FAMILY HISTORY:  Father  at the age of 60 with cancer and mother is 83-year-old    Social history he denies any history of smoking or any alcohol consumption.        REVIEW OF SYSTEM:  Pertinent items are noted in HPI.  Constitutional	Negative for chills, fevers.    Eyes: 	Negative for visual disturbance.  Ears, nose, mouth, throat, and face: Negative for epistaxis, nasal congestion, sore throat and tinnitus.  Neck:	Negative for enlargement, pain and difficulty in swallowing  Respiration : Negative for cough,, pleuritic chest pain and wheezing  Cardiovascular: Negative for chest pain,  and palpitations , he has dyspnea with mild exertion    Gastrointestinal : Negative for abdominal pain, diarrhea, nausea and vomiting  Genitourinary: Negative for dysuria, frequency and urinary incontinence .  Skin: Negative for  rash, pruritus, swelling, dryness .  	  Hematologic/lymphatic: Negative for bleeding and easy bruising  Musculoskeletal: Negative for arthralgias, back pain and muscle weakness.  Neurological: Negative for dizziness, headaches, seizures and tremors  Behavioral/Psych: Negative for mood change, depression.  Endocrine:	Negative for blurry vision, polydipsia and polyuria, diaphoresis.   Allergic/Immunologic:	Negative for anaphylaxis, angioedema and urticaria.      Vital Signs Last 24 Hrs  T(C): 36.9 (23 Aug 2017 10:05), Max: 36.9 (23 Aug 2017 10:05)  T(F): 98.4 (23 Aug 2017 10:05), Max: 98.4 (23 Aug 2017 10:05)  HR: 57 (23 Aug 2017 10:05) (50 - 64)  BP: 159/- (23 Aug 2017 10:05) (126/49 - 161/68)  BP(mean): 99 (22 Aug 2017 14:38) (99 - 99)  RR: 17 (23 Aug 2017 10:05) (17 - 18)  SpO2: 98% (23 Aug 2017 10:05) (93% - 100%)        PHYSICAL EXAM  General Appearance: cooperative, no acute distress,   HEENT: PERRL, conjunctiva clear, EOM's intact, non injected pharynx, no exudate, TM   normal  Neck: Supple, , no adenopathy, thyroid: not enlarged, no carotid bruit or JVD  Back: Symmetric, no  tenderness,no soft tissue tenderness  Lungs: Clear to auscultation bilateral,no adventitious breath sounds, normal   expiratory phase  Heart: Regular rate and rhythm, S1, S2 normal, no murmur, rub or gallop  Abdomen: Soft, non-tender, bowel sounds active , no hepatosplenomegaly  Extremities: no cyanosis or edema, no joint swelling  Skin: Skin color, texture normal, no rashes   Neurologic: Alert and oriented X3 , cranial nerves intact, sensory and motor normal,        INTERPRETATION OF TELEMETRY: NDR     ECG: NSR         LABS:                          9.8    7.7   )-----------( 143      ( 23 Aug 2017 06:44 )             31.2         140  |  97  |  14  ----------------------------<  97  3.5   |  44<H>  |  0.45<L>    Ca    9.1      23 Aug 2017 06:44    TPro  6.4  /  Alb  2.9<L>  /  TBili  0.3  /  DBili  x   /  AST  14<L>  /  ALT  27  /  AlkPhos  85      CARDIAC MARKERS ( 22 Aug 2017 16:38 )  <0.015 ng/mL / x     / x     / x     / x      CARDIAC MARKERS ( 22 Aug 2017 13:14 )  <0.015 ng/mL / x     / x     / x     / x      CARDIAC MARKERS ( 22 Aug 2017 10:26 )  <0.015 ng/mL / x     / x     / x     / x            Pro BNP  --  @ 16:38  D Dimer  231  @ 16:38  Pro BNP  124  @ 10:26  D Dimer  --  @ 10:26    PT/INR - ( 22 Aug 2017 10:26 )   PT: 11.0 sec;   INR: 1.02 ratio         PTT - ( 22 Aug 2017 10:26 )  PTT:32.3 sec    ABG - ( 22 Aug 2017 18:37 )  pH: 7.36  /  pCO2: 69    /  pO2: 77    / HCO3: 38    / Base Excess: 11    /  SaO2: 95                    RADIOLOGY & ADDITIONAL STUDIES:

## 2017-08-23 NOTE — CONSULT NOTE ADULT - ASSESSMENT
Dyspnea is multifactorial, he is morbidly obese, asthma, drug withdrawal, but we need to rule out coronary artery disease, since his dyspnea is chronic.  Chronic low back pain.  Hypercapnic chronic respiratory failure.  Morbid obesity.  History of asthma.  Suggest  Observe on telemetry.  Continue with current cardiac medications.  Follow-up with CPK, troponin I and EKG.  Echocardiogram to evaluate LV function.  Due to shortness of breath on mild exertion will consider right and left heart catheterization.  Add lengthy discussion with patient and his son all options risks discussed with the patient and his son.  Discussed with interventional cardiologist .  Discussed with Dr. Damico.

## 2017-08-24 LAB
CHOLEST SERPL-MCNC: 149 MG/DL — SIGNIFICANT CHANGE UP (ref 10–199)
HDLC SERPL-MCNC: 61 MG/DL — SIGNIFICANT CHANGE UP (ref 40–125)
LIPID PNL WITH DIRECT LDL SERPL: 76 MG/DL — SIGNIFICANT CHANGE UP
TOTAL CHOLESTEROL/HDL RATIO MEASUREMENT: 2.4 RATIO — LOW (ref 3.4–9.6)
TRIGL SERPL-MCNC: 60 MG/DL — SIGNIFICANT CHANGE UP (ref 10–149)

## 2017-08-24 RX ADMIN — Medication 100 MILLIGRAM(S): at 13:18

## 2017-08-24 RX ADMIN — MORPHINE SULFATE 75 MILLIGRAM(S): 50 CAPSULE, EXTENDED RELEASE ORAL at 06:41

## 2017-08-24 RX ADMIN — MORPHINE SULFATE 75 MILLIGRAM(S): 50 CAPSULE, EXTENDED RELEASE ORAL at 13:19

## 2017-08-24 RX ADMIN — Medication 0.25 MILLIGRAM(S): at 18:25

## 2017-08-24 RX ADMIN — Medication 40 MILLIGRAM(S): at 05:43

## 2017-08-24 RX ADMIN — Medication 1 MILLIGRAM(S): at 13:18

## 2017-08-24 RX ADMIN — MORPHINE SULFATE 75 MILLIGRAM(S): 50 CAPSULE, EXTENDED RELEASE ORAL at 21:40

## 2017-08-24 RX ADMIN — SERTRALINE 100 MILLIGRAM(S): 25 TABLET, FILM COATED ORAL at 21:43

## 2017-08-24 RX ADMIN — SERTRALINE 100 MILLIGRAM(S): 25 TABLET, FILM COATED ORAL at 05:45

## 2017-08-24 RX ADMIN — MONTELUKAST 10 MILLIGRAM(S): 4 TABLET, CHEWABLE ORAL at 21:41

## 2017-08-24 RX ADMIN — LOSARTAN POTASSIUM 25 MILLIGRAM(S): 100 TABLET, FILM COATED ORAL at 05:43

## 2017-08-24 RX ADMIN — Medication 100 MILLIGRAM(S): at 21:40

## 2017-08-24 RX ADMIN — Medication 3 MILLILITER(S): at 19:39

## 2017-08-24 RX ADMIN — Medication 3 MILLILITER(S): at 13:46

## 2017-08-24 RX ADMIN — Medication 81 MILLIGRAM(S): at 13:18

## 2017-08-24 RX ADMIN — Medication 100 MILLIGRAM(S): at 05:44

## 2017-08-24 RX ADMIN — MORPHINE SULFATE 75 MILLIGRAM(S): 50 CAPSULE, EXTENDED RELEASE ORAL at 14:20

## 2017-08-24 NOTE — PROGRESS NOTE ADULT - ASSESSMENT
Dyspnea is multifactorial, he is morbidly obese, asthma, anemia , obesity, pulmonary hypertension are all contributing factors.  Chronic low back pain.  Hypercapnic chronic respiratory failure.  Morbid obesity.  History of asthma.  Suggest  Observe on telemetry.  Continue with current cardiac medications.  pulmonary  evaluation and follow-up.  Patient she uses CPAP regularly.  follow-up with H&Hand anemia workup.  Add lengthy discussion with patient and his son all options risks discussed with the patient and his son.  Discussed with interventional cardiologist .  Discussed with Dr. Damico.

## 2017-08-24 NOTE — CHART NOTE - NSCHARTNOTEFT_GEN_A_CORE
s/p LHC revealing non obstructive CAD    Patient feels well.  Denies chest pain, shortness of breath at rest,  dizziness or palpitations at this time    Right radial hemoband in place.  Procedure site CDI, no bleeding, no hematoma, able to move all digits with capillary refill < 3 seconds, fingers warm      HPI:  61M.  hx chronic LBP due to OA-has been opioid requiring > 10 years.  usual state of health until 1 week ago.  according to patient, Dr. Crocker had tapered his MS Contin 100mg po tid to 60mg po tid.  since, he has become increasingly anxious.  3 days ago, patient developed a cough, jittery, severe anxiety and shortness of breath which has progressed.  CP the morning of Er presentation.   now s/p LHC revealing non obstructive CAD and elevated right heart pressures    -continue tele  -continue hospitalist service  -vital signs, diet and activity per post procedure orders  -ambulate ad zan post bedrest  -plan of care discussed with patient, family and MD  -Medical management per primary cardiologist   -post procedure instructions reviewed  -Lifestyle modification, weight loss s/p LHC revealing non obstructive CAD    Patient feels well.  Denies chest pain, shortness of breath at rest,  dizziness or palpitations at this time    Right radial hemoband in place.  Procedure site CDI, no bleeding, no hematoma, able to move all digits with capillary refill < 3 seconds, fingers warm      HPI:  61M.  hx chronic LBP due to OA-has been opioid requiring > 10 years.  usual state of health until 1 week ago.  according to patient, Dr. Crocker had tapered his MS Contin 100mg po tid to 60mg po tid.  since, he has become increasingly anxious.  3 days ago, patient developed a cough, jittery, severe anxiety and shortness of breath which has progressed.  CP the morning of Er presentation.   now s/p LHC revealing normal coronaries and elevated right heart pressures    -continue tele  -continue hospitalist service  -vital signs, diet and activity per post procedure orders  -ambulate ad zan post bedrest  -plan of care discussed with patient, family and MD  -Medical management per primary cardiologist   -post procedure instructions reviewed  -Lifestyle modification, weight loss

## 2017-08-25 LAB
ANION GAP SERPL CALC-SCNC: 5 MMOL/L — SIGNIFICANT CHANGE UP (ref 5–17)
BUN SERPL-MCNC: 19 MG/DL — SIGNIFICANT CHANGE UP (ref 7–23)
CALCIUM SERPL-MCNC: 9.2 MG/DL — SIGNIFICANT CHANGE UP (ref 8.5–10.1)
CHLORIDE SERPL-SCNC: 94 MMOL/L — LOW (ref 96–108)
CO2 SERPL-SCNC: 41 MMOL/L — HIGH (ref 22–31)
CREAT SERPL-MCNC: 0.53 MG/DL — SIGNIFICANT CHANGE UP (ref 0.5–1.3)
GLUCOSE SERPL-MCNC: 97 MG/DL — SIGNIFICANT CHANGE UP (ref 70–99)
HCT VFR BLD CALC: 33.1 % — LOW (ref 39–50)
HGB BLD-MCNC: 10.3 G/DL — LOW (ref 13–17)
MCHC RBC-ENTMCNC: 26 PG — LOW (ref 27–34)
MCHC RBC-ENTMCNC: 31.2 GM/DL — LOW (ref 32–36)
MCV RBC AUTO: 83.3 FL — SIGNIFICANT CHANGE UP (ref 80–100)
PLATELET # BLD AUTO: 156 K/UL — SIGNIFICANT CHANGE UP (ref 150–400)
POTASSIUM SERPL-MCNC: 3.6 MMOL/L — SIGNIFICANT CHANGE UP (ref 3.5–5.3)
POTASSIUM SERPL-SCNC: 3.6 MMOL/L — SIGNIFICANT CHANGE UP (ref 3.5–5.3)
RBC # BLD: 3.97 M/UL — LOW (ref 4.2–5.8)
RBC # FLD: 14.1 % — SIGNIFICANT CHANGE UP (ref 10.3–14.5)
SODIUM SERPL-SCNC: 140 MMOL/L — SIGNIFICANT CHANGE UP (ref 135–145)
WBC # BLD: 9 K/UL — SIGNIFICANT CHANGE UP (ref 3.8–10.5)
WBC # FLD AUTO: 9 K/UL — SIGNIFICANT CHANGE UP (ref 3.8–10.5)

## 2017-08-25 PROCEDURE — 71010: CPT | Mod: 26

## 2017-08-25 RX ADMIN — ENOXAPARIN SODIUM 40 MILLIGRAM(S): 100 INJECTION SUBCUTANEOUS at 13:52

## 2017-08-25 RX ADMIN — Medication 81 MILLIGRAM(S): at 11:29

## 2017-08-25 RX ADMIN — Medication 0.25 MILLIGRAM(S): at 18:14

## 2017-08-25 RX ADMIN — Medication 1 MILLIGRAM(S): at 11:29

## 2017-08-25 RX ADMIN — MORPHINE SULFATE 75 MILLIGRAM(S): 50 CAPSULE, EXTENDED RELEASE ORAL at 22:32

## 2017-08-25 RX ADMIN — Medication 3 MILLILITER(S): at 14:15

## 2017-08-25 RX ADMIN — MORPHINE SULFATE 75 MILLIGRAM(S): 50 CAPSULE, EXTENDED RELEASE ORAL at 13:46

## 2017-08-25 RX ADMIN — MORPHINE SULFATE 75 MILLIGRAM(S): 50 CAPSULE, EXTENDED RELEASE ORAL at 05:19

## 2017-08-25 RX ADMIN — Medication 40 MILLIGRAM(S): at 05:17

## 2017-08-25 RX ADMIN — SERTRALINE 100 MILLIGRAM(S): 25 TABLET, FILM COATED ORAL at 18:15

## 2017-08-25 RX ADMIN — LOSARTAN POTASSIUM 25 MILLIGRAM(S): 100 TABLET, FILM COATED ORAL at 05:18

## 2017-08-25 RX ADMIN — Medication 3 MILLILITER(S): at 19:21

## 2017-08-25 RX ADMIN — MONTELUKAST 10 MILLIGRAM(S): 4 TABLET, CHEWABLE ORAL at 22:32

## 2017-08-25 RX ADMIN — Medication 100 MILLIGRAM(S): at 05:19

## 2017-08-25 RX ADMIN — Medication 100 MILLIGRAM(S): at 13:49

## 2017-08-25 RX ADMIN — SERTRALINE 100 MILLIGRAM(S): 25 TABLET, FILM COATED ORAL at 05:18

## 2017-08-25 RX ADMIN — Medication 100 MILLIGRAM(S): at 22:32

## 2017-08-25 RX ADMIN — MORPHINE SULFATE 75 MILLIGRAM(S): 50 CAPSULE, EXTENDED RELEASE ORAL at 14:46

## 2017-08-25 RX ADMIN — Medication 3 MILLILITER(S): at 08:43

## 2017-08-25 NOTE — PROGRESS NOTE ADULT - ASSESSMENT
61M.  hx chronic LBP due to OA-has been opioid requiring > 10 years.  usual state of health until 1 week ago.  according to patient, Dr. Crocker had tapered his MS Contin 100mg po tid to 60mg po tid.  since, he has become increasingly anxious.  3 days ago, patient developed a cough, jittery, severe anxiety and shortness of breath which has progressed.  This AM, patient had chest pain was was brought to the ED by his two sons.  in ED, was given BD nebs and SoluMedrol.    GERALD  Obesity Hypoventilation syndrome  Chronic hypercapnic / hypoxemic resp failure  Chronic pain syndrome on narcotics also cont factor  Rest pulm dysfunction  COPD without active bronchospasm  recent initiation of supplemental o2  eval for CHF and pulm HTN  s/p cath with evidence of severe pulm HTn        plan  trial BIPAP but likely unsuccessful with prior hx  ECHO  cardiac eval  walk with o2  DUOPNEB  supplemental o2 needed  incentive spirometry  will need outpt fu  overall prognosis remains guarded  needs WT loss  sleep study required as well  VQ scan to r/o chronic thromboembolic dz today

## 2017-08-25 NOTE — PROGRESS NOTE ADULT - ASSESSMENT
Dyspnea is multifactorial, he is morbidly obese, asthma, anemia , obesity, pulmonary hypertension are all contributing factors.  Chronic low back pain.  Hypercapnic chronic respiratory failure.  Morbid obesity.  History of asthma.  Suggest  Observe on telemetry.  Continue with current cardiac medications.  pulmonary  evaluation and follow-up.  Patient should uses CPAP regularly.  follow-up with H&Hand anemia workup.  I shall no follow-up when necessary.

## 2017-08-26 ENCOUNTER — TRANSCRIPTION ENCOUNTER (OUTPATIENT)
Age: 62
End: 2017-08-26

## 2017-08-26 VITALS
RESPIRATION RATE: 18 BRPM | OXYGEN SATURATION: 99 % | DIASTOLIC BLOOD PRESSURE: 55 MMHG | TEMPERATURE: 98 F | SYSTOLIC BLOOD PRESSURE: 132 MMHG | HEART RATE: 65 BPM

## 2017-08-26 RX ORDER — LOSARTAN POTASSIUM 100 MG/1
1 TABLET, FILM COATED ORAL
Qty: 0 | Refills: 0 | COMMUNITY
Start: 2017-08-26

## 2017-08-26 RX ORDER — DOCUSATE SODIUM 100 MG
1 CAPSULE ORAL
Qty: 0 | Refills: 0 | COMMUNITY
Start: 2017-08-26

## 2017-08-26 RX ADMIN — Medication 40 MILLIGRAM(S): at 06:16

## 2017-08-26 RX ADMIN — Medication 100 MILLIGRAM(S): at 06:16

## 2017-08-26 RX ADMIN — Medication 3 MILLILITER(S): at 02:32

## 2017-08-26 RX ADMIN — Medication 3 MILLILITER(S): at 08:26

## 2017-08-26 RX ADMIN — MORPHINE SULFATE 75 MILLIGRAM(S): 50 CAPSULE, EXTENDED RELEASE ORAL at 06:16

## 2017-08-26 RX ADMIN — Medication 1 MILLIGRAM(S): at 11:36

## 2017-08-26 RX ADMIN — LOSARTAN POTASSIUM 25 MILLIGRAM(S): 100 TABLET, FILM COATED ORAL at 06:16

## 2017-08-26 RX ADMIN — Medication 81 MILLIGRAM(S): at 11:37

## 2017-08-26 RX ADMIN — SERTRALINE 100 MILLIGRAM(S): 25 TABLET, FILM COATED ORAL at 06:17

## 2017-08-26 NOTE — DISCHARGE NOTE ADULT - PATIENT PORTAL LINK FT
“You can access the [facility] Patient Portal, offered by [facility], by registering with the following website: [email or web address]”

## 2017-08-26 NOTE — DISCHARGE NOTE ADULT - HOSPITAL COURSE
shortness of breath and anxiety.    61M.  hx chronic LBP due to OA-has been opioid requiring > 10 years.  usual state of health until 1 week ago.  according to patient, Dr. Crocker had tapered his MS Contin 100mg po tid to 60mg po tid.  since, he has become increasingly anxious.  3 days ago, patient developed a cough, jittery, severe anxiety and shortness of breath which has progressed.  This AM, patient had chest pain was was brought to the ED by his two sons.  in ED, was given BD nebs and SoluMedrol.    PMHx:  hypoxia respiratory failure-O2 dependence;  GERALD;  asthma;  HTN;  "chest congestion and leg swelling" w/o cardiac w/u (denies stress test, angiogram, hx of MI, stents or CABG);  chronic LBP due to severe OA-malfunctioning spinal cord stimulator.    08/23-son at bedside.  no complaints.    08/24-overall feeling better.  unable to tolerate BiPAP due to claustrophobia.    08/25-breathing has been stable.    08/26-appears to be breathing comfortably.  leg swelling better.  no new complaints.    ROS:  (-) anxiety.  (-) chest pain at current time.  (-) fever, chills or rigors. (-) melena, hematochezia, hemoptemesis.  (-) hematuria.  (-) hemoptysis.    Vital Signs Last 24 Hrs  T(C): 36.4 (26 Aug 2017 10:32), Max: 37.3 (26 Aug 2017 05:06)  T(F): 97.5 (26 Aug 2017 10:32), Max: 99.2 (26 Aug 2017 05:06)  HR: 65 (26 Aug 2017 10:32) (60 - 103)  BP: 132/55 (26 Aug 2017 10:32) (132/55 - 158/70)  BP(mean): --  RR: 18 (26 Aug 2017 10:32) (18 - 18)  SpO2: 99% (26 Aug 2017 10:32) (96% - 99%)                        10.3   9.0   )-----------( 156      ( 25 Aug 2017 06:55 )             33.1       08-25    140  |  94<L>  |  19  ----------------------------<  97  3.6   |  41<H>  |  0.53    Ca    9.2      25 Aug 2017 06:55    shortness of breath and anxiety.  hx panic attacks.  -favor opioid withdrawel syndrome due to recent decrease in MS Contin.  -Pain Management f/u, Dr. Antonio Crocker.      hypoxic/hypercapnic acute upon chronic respiratory failure.  hx GERALD.  hx asthma.  -resolved.  -severe pHTN demonstrated on cath.  -b/l LE venous dopplers, r/o DVT.  -CTA (-) large or central pulmonary PE.  -VQ scan ordered, re:  r/o chronic PE (part of pHTN w/u).  however, patient unable to tolerate study.  -TTE LVEF 50-55%.  RV Systolic Pressure: 38.94 mmHg.  -has not tolerated BiPAP in the past.  -c/w Lasix 40mg po qd.  -weight loss.  -taper opioids.  -pHTN specialist / Pulmonary f/u outpatient, Dr. Lopez.    chest pain.  -telemetry.  -TnI (-).  -EKG (-) acute changes.  -angiogram, normal coronary arteries.  -c/w ASA for primary propylaxis.  -Cardiology f/u outpatient.    normocytic anemia.  -HH normalizing.  -advise PMD f/u outpatient.    chronic LE edema.  -c/w Lasix.  -PMD f/u outpatient.    hx morbid obesity.  -A1C nml.  -TSH nml.  -lifestyle modifications.    disposition.  -d/c home today.  -document O2 saturation w/ NC upon ambulation prior to d/c.  -schedule f/u appointment w/ Dr. Lopez.  -communication w/ son(s) 08/22, 08/23, 08/25 and 08/26.  evaluation, management and follow up plans discussed at length.  all questions answered.  -PMD, Dr. Mick Griggs.  service notified.  call back requested.  -d/c > 35 minutes. shortness of breath and anxiety.    61M.  hx chronic LBP due to OA-has been opioid requiring > 10 years.  usual state of health until 1 week ago.  according to patient, Dr. Crocker had tapered his MS Contin 100mg po tid to 60mg po tid.  since, he has become increasingly anxious.  3 days ago, patient developed a cough, jittery, severe anxiety and shortness of breath which has progressed.  This AM, patient had chest pain was was brought to the ED by his two sons.  in ED, was given BD nebs and SoluMedrol.    PMHx:  hypoxia respiratory failure-O2 dependence;  GERALD;  asthma;  HTN;  "chest congestion and leg swelling" w/o cardiac w/u (denies stress test, angiogram, hx of MI, stents or CABG);  chronic LBP due to severe OA-malfunctioning spinal cord stimulator.    08/23-son at bedside.  no complaints.    08/24-overall feeling better.  unable to tolerate BiPAP due to claustrophobia.    08/25-breathing has been stable.    08/26-appears to be breathing comfortably.  leg swelling better.  no new complaints.    ROS:  (-) anxiety.  (-) chest pain at current time.  (-) fever, chills or rigors. (-) melena, hematochezia, hemoptemesis.  (-) hematuria.  (-) hemoptysis.    Vital Signs Last 24 Hrs  T(C): 36.4 (26 Aug 2017 10:32), Max: 37.3 (26 Aug 2017 05:06)  T(F): 97.5 (26 Aug 2017 10:32), Max: 99.2 (26 Aug 2017 05:06)  HR: 65 (26 Aug 2017 10:32) (60 - 103)  BP: 132/55 (26 Aug 2017 10:32) (132/55 - 158/70)  BP(mean): --  RR: 18 (26 Aug 2017 10:32) (18 - 18)  SpO2: 99% (26 Aug 2017 10:32) (96% - 99%)    no acute respiratory distress.  lungs clear  heart regular  abd soft  ext trace edema  neuro nonfocal                         10.3   9.0   )-----------( 156      ( 25 Aug 2017 06:55 )             33.1       08-25    140  |  94<L>  |  19  ----------------------------<  97  3.6   |  41<H>  |  0.53    Ca    9.2      25 Aug 2017 06:55    shortness of breath and anxiety.  hx panic attacks.  -favor opioid withdrawel syndrome due to recent decrease in MS Contin.  -Pain Management f/u, Dr. Antonio Crocker.      hypoxic/hypercapnic acute upon chronic respiratory failure.  hx GERALD.  hx asthma.  -resolved.  -severe pHTN demonstrated on cath.  -b/l LE venous dopplers, r/o DVT.  -CTA (-) large or central pulmonary PE.  -VQ scan ordered, re:  r/o chronic PE (part of pHTN w/u).  however, patient unable to tolerate study.  -TTE LVEF 50-55%.  RV Systolic Pressure: 38.94 mmHg.  -has not tolerated BiPAP in the past.  -c/w Lasix 40mg po qd.  -weight loss.  -taper opioids.  -pHTN specialist / Pulmonary f/u outpatient, Dr. Lopez.    chest pain.  -telemetry.  -TnI (-).  -EKG (-) acute changes.  -angiogram, normal coronary arteries.  -c/w ASA for primary propylaxis.  -Cardiology f/u outpatient.    normocytic anemia.  -HH normalizing.  -advise PMD f/u outpatient.    chronic LE edema.  -c/w Lasix.  -PMD f/u outpatient.    hx morbid obesity.  -A1C nml.  -TSH nml.  -lifestyle modifications.    disposition.  -d/c home today.  -document O2 saturation w/ NC upon ambulation prior to d/c.  -schedule f/u appointment w/ Dr. Lopez.  -communication w/ son(s) 08/22, 08/23, 08/25 and 08/26.  evaluation, management and follow up plans discussed at length.  all questions answered.  -PMD, Dr. Mick Griggs.  service notified.  call back requested.  -d/c > 35 minutes.

## 2017-08-26 NOTE — DISCHARGE NOTE ADULT - MEDICATION SUMMARY - MEDICATIONS TO TAKE
I will START or STAY ON the medications listed below when I get home from the hospital:    morphine 12 hour extended release  -- 60 milligram(s) by mouth 3 times a day  -- Indication: For Chonic pain    aspirin 81 mg oral delayed release tablet  -- 1 tab(s) by mouth once a day  -- Indication: For Cardiovascular prophylaxis    losartan 25 mg oral tablet  -- 1 tab(s) by mouth once a day  -- Indication: For hypertension    sertraline 100 mg oral tablet  -- 1 tab(s) by mouth 2 times a day  -- Indication: For Depression    Xanax 0.25 mg oral tablet  -- 1 tab(s) by mouth 2 times a day  -- Indication: For anxiety    furosemide 40 mg oral tablet  -- 1 tab(s) by mouth once a day  -- Indication: For peripheral edema.  pulonary HTN    docusate sodium 100 mg oral capsule  -- 1 cap(s) by mouth 3 times a day  -- Indication: For Constipation    montelukast 10 mg oral tablet  -- 1 tab(s) by mouth once a day (at bedtime)  -- Indication: For asthma    folic acid 1 mg oral tablet  -- 1 tab(s) by mouth once a day  -- Indication: For anemia

## 2017-08-26 NOTE — DISCHARGE NOTE ADULT - CARE PLAN
Principal Discharge DX:	Dyspnea, unspecified type  Goal:	see below.  Instructions for follow-up, activity and diet:	taper with the intent to discontinue MS Conting and Xanax.  weight loss.  tolerate CPAP if possible.  Lasix.  follow up with pulmonary hypertension specialist.  Secondary Diagnosis:	Anemia  Goal:	see below.  Instructions for follow-up, activity and diet:	follow up with primary medical doctor regarding continued work up (including iron studies and stool for occult blood) and screenings (e.g. colon cancer and prostate cancer screenings).

## 2017-08-26 NOTE — PROGRESS NOTE ADULT - SUBJECTIVE AND OBJECTIVE BOX
shortness of breath and anxiety.    61M.  hx chronic LBP due to OA-has been opioid requiring > 10 years.  usual state of health until 1 week ago.  according to patient, Dr. Crocker had tapered his MS Contin 100mg po tid to 60mg po tid.  since, he has become increasingly anxious.  3 days ago, patient developed a cough, jittery, severe anxiety and shortness of breath which has progressed.  This AM, patient had chest pain was was brought to the ED by his two sons.  in ED, was given BD nebs and SoluMedrol.    PMHx:  hypoxia respiratory failure-O2 dependence;  GERALD;  asthma;  HTN;  "chest congestion and leg swelling" w/o cardiac w/u (denies stress test, angiogram, hx of MI, stents or CABG);  chronic LBP due to severe OA-malfunctioning spinal cord stimulator.    08/23-son at bedside.  no complaints.    08/24-overall feeling better.  unable to tolerate BiPAP due to claustrophobia.    08/25-breathing has been stable.    ROS:  (+) anxiety.  (-) chest pain at current time.  (-) fever, chills or rigors. (-) melena, hematochezia, hemoptemesis.  (-) hematuria.  (-) hemoptysis.    no acute respiratory distress.  lungs clear  heart regular  abd soft  ext no edema  neuro nonfocal     MEDICATIONS  (STANDING):  enoxaparin Injectable 40 milliGRAM(s) SubCutaneous every 24 hours  aspirin enteric coated 81 milliGRAM(s) Oral daily  docusate sodium 100 milliGRAM(s) Oral three times a day  ALBUTerol/ipratropium for Nebulization 3 milliLiter(s) Nebulizer every 6 hours  morphine ER Tablet 75 milliGRAM(s) Oral three times a day  losartan 25 milliGRAM(s) Oral daily  sertraline 100 milliGRAM(s) Oral two times a day  furosemide    Tablet 40 milliGRAM(s) Oral daily  montelukast 10 milliGRAM(s) Oral at bedtime  folic acid 1 milliGRAM(s) Oral daily    MEDICATIONS  (PRN):  acetaminophen   Tablet 650 milliGRAM(s) Oral every 6 hours PRN For Temp greater than 38.5 C (101.3 F)  acetaminophen   Tablet. 650 milliGRAM(s) Oral every 6 hours PRN Mild Pain (1 - 3)  senna 2 Tablet(s) Oral at bedtime PRN Constipation  ALBUTerol    0.083% 2.5 milliGRAM(s) Nebulizer every 3 hours PRN Shortness of Breath and/or Wheezing  ALPRAZolam 0.25 milliGRAM(s) Oral three times a day PRN anxiety.    Vital Signs Last 24 Hrs  T(C): 36.8 (25 Aug 2017 10:25), Max: 36.8 (24 Aug 2017 16:10)  T(F): 98.2 (25 Aug 2017 10:25), Max: 98.3 (24 Aug 2017 16:10)  HR: 80 (25 Aug 2017 10:25) (68 - 585)  BP: 137/75 (25 Aug 2017 10:25) (137/75 - 175/677)  BP(mean): 82 (24 Aug 2017 20:00) (82 - 100)  RR: 18 (25 Aug 2017 10:25) (18 - 181)  SpO2: 96% (25 Aug 2017 10:25) (96% - 99%)                        10.3   9.0   )-----------( 156      ( 25 Aug 2017 06:55 )             33.1       08-25    140  |  94<L>  |  19  ----------------------------<  97  3.6   |  41<H>  |  0.53    Ca    9.2      25 Aug 2017 06:55    shortness of breath and anxiety.  hx panic attacks.  -favor opioid withdrawel syndrome due to recent decrease in MS Contin.  -check urine toxicology.  -increased MS Contin 75mg po tid.  -c/w Zoloft and Xanax.  -d/w Dr. Antonio Crocker.  agrees with above.  -Psychiatry input appreciated.    hypoxic/hypercapnic acute upon chronic respiratory failure.  hx GERALD.  hx asthma.  -severe pHTN demonstrated on cath.  -b/l LE venous dopplers, r/o DVT.  -CTA (-) large or central pulmonary PE.  -VQ scan ordered, re:  r/o chronic PE (part of pHTN w/u).  however, patient unable to tolerate study.  -TTE LVEF 50-55%.  RV Systolic Pressure: 38.94 mmHg.  -has not tolerated BiPAP in the past.  -c/w Lasix 40mg po qd.  -weight loss.  -taper opioids.  -c/w DUO nebs q6h + DELMI q3h PRN.  -pHTN specialist Dr. Lopez, f/u outpatient.    chest pain.  -telemetry.  -TnI (-).  -EKG (-) acute changes.  -ASA.  -Cardiology input noted.    normocytic anemia.  -HH normalizing.  -Fe studies.  -B12.  folate.  -stool OB.  -PMD f/u outpatient.    hx chest congestion and LE edema.  -BNP wnl.  -TTE.  -c/w Lasix.    hx morbid obesity.  -A1C.  -TSH.  -lifestyle modifications.    disposition.  -telemetry unit.  -mobilize.  -set up home O2.  -schedule f/u appointment w/ Dr. Lopez.  -anticipate d/c in AM.
HPI:  61M.  hx chronic Low back pain  due to OA-has been opioid requiring > 10 years.  usual state of health until 1 week ago.  according  to patient, He had tapered his MS Contin 100mg po tid to 60mg po tid.  since, he has become increasingly anxious.  3 days ago, patient developed a cough, jittery, severe anxiety and shortness of breath which has progressed.  Patient states prior to tapering his pain medication, he gets short of breath with mild exertion and he gets tired easily.  Due to increasing shortness of breath on exertion, he underwent cardiac catheterization and he has normal coronaries, he has elevated pearly pressure suggestive of primary hypertension.        PAST MEDICAL & SURGICAL HISTORY:  Neuritis  GERALD (obstructive sleep apnea)  Osteoarthritis  Chronic back pain greater than 3 months duration  Asthma  HTN (hypertension)  S/P hip replacement: bilateral  S/P spinal surgery  S/P appendectomy      MEDICATIONS  (STANDING):  enoxaparin Injectable 40 milliGRAM(s) SubCutaneous every 24 hours  aspirin enteric coated 81 milliGRAM(s) Oral daily  docusate sodium 100 milliGRAM(s) Oral three times a day  ALBUTerol/ipratropium for Nebulization 3 milliLiter(s) Nebulizer every 6 hours  morphine ER Tablet 75 milliGRAM(s) Oral three times a day  losartan 25 milliGRAM(s) Oral daily  sertraline 100 milliGRAM(s) Oral two times a day  furosemide    Tablet 40 milliGRAM(s) Oral daily  montelukast 10 milliGRAM(s) Oral at bedtime  folic acid 1 milliGRAM(s) Oral daily    MEDICATIONS  (PRN):  acetaminophen   Tablet 650 milliGRAM(s) Oral every 6 hours PRN For Temp greater than 38.5 C (101.3 F)  acetaminophen   Tablet. 650 milliGRAM(s) Oral every 6 hours PRN Mild Pain (1 - 3)  senna 2 Tablet(s) Oral at bedtime PRN Constipation  ALBUTerol    0.083% 2.5 milliGRAM(s) Nebulizer every 3 hours PRN Shortness of Breath and/or Wheezing  ALPRAZolam 0.25 milliGRAM(s) Oral three times a day PRN anxiety.          REVIEW OF SYSTEM:  Pertinent items are noted in HPI.  Constitutional	Negative for chills, fevers, sweats.    Eyes: 	Negative for visual disturbance.  Ears, nose, mouth, throat, and face: Negative for epistaxis, nasal congestion, sore throat and tinnitus.  Neck:	Negative for enlargement, pain and difficulty in swallowing  Respiration : Negative for cough,  pleuritic chest pain and wheezing  Cardiovascular: Negative for chest pain,  and palpitations , he has dyspnea on exertion.    Gastrointestinal : Negative for abdominal pain, diarrhea, nausea and vomiting  Genitourinary: Negative for dysuria, frequency and urinary incontinence .  Skin: Negative for  rash, pruritus, swelling, dryness .  	  Hematologic/lymphatic: Negative for bleeding and easy bruising  Musculoskeletal: Negative for arthralgias, back pain and muscle weakness.  Neurological: Negative for dizziness, headaches, seizures and tremors  Behavioral/Psych: Negative for mood change, depression.  Endocrine:	Negative for blurry vision, polydipsia and polyuria, diaphoresis.   Allergic/Immunologic:	Negative for anaphylaxis, angioedema and urticaria.      Vital Signs Last 24 Hrs  T(C): 36.8 (24 Aug 2017 07:30), Max: 36.8 (24 Aug 2017 07:30)  T(F): 98.3 (24 Aug 2017 07:30), Max: 98.3 (24 Aug 2017 07:30)  HR: 60 (24 Aug 2017 10:05) (57 - 69)  BP: 154/69 (24 Aug 2017 10:05) (138/71 - 161/66)  BP(mean): --  RR: 16 (24 Aug 2017 10:05) (16 - 18)  SpO2: 98% (24 Aug 2017 10:05) (93% - 99%)          PHYSICAL EXAM  General Appearance: cooperative, no acute distress,   HEENT: PERRL, conjunctiva clear, EOM's intact, non injected pharynx, no exudate.   Neck: Supple, , no adenopathy, thyroid: not enlarged, no carotid bruit or JVD  Back: Symmetric, no  tenderness,no soft tissue tenderness  Lungs: Clear to auscultation bilateral,no adventitious breath sounds, normal   expiratory phase  Heart: Regular rate and rhythm, S1, S2 normal, no murmur, rub or gallop  Abdomen: Soft, non-tender, bowel sounds active , no hepatosplenomegaly  Extremities: no cyanosis or edema, no joint swelling  Skin: Skin color, texture normal, no rashes   Neurologic: Alert and oriented X3 , cranial nerves intact, sensory and motor normal,        INTERPRETATION OF TELEMETRY: NSR            LABS:                          9.8    7.7   )-----------( 143      ( 23 Aug 2017 06:44 )             31.2     08-23    140  |  97  |  14  ----------------------------<  97  3.5   |  44<H>  |  0.45<L>    Ca    9.1      23 Aug 2017 06:44      CARDIAC MARKERS ( 22 Aug 2017 16:38 )  <0.015 ng/mL / x     / x     / x     / x      CARDIAC MARKERS ( 22 Aug 2017 13:14 )  <0.015 ng/mL / x     / x     / x     / x          Lipid Panel  149  61  76  60    Pro BNP  -- 08-22 @ 16:38  D Dimer  231 08-22 @ 16:38  Pro BNP  124 08-22 @ 10:26  D Dimer  -- 08-22 @ 10:26        ABG - ( 22 Aug 2017 18:37 )  pH: 7.36  /  pCO2: 69    /  pO2: 77    / HCO3: 38    / Base Excess: 11    /  SaO2: 95
HPI:  Patient now is comfortable, he denies any chest pain or shortness of breath. He is in normal sinus rhythm on telemetry .Due to increasing shortness of breath on exertion, he underwent cardiac catheterization and he has normal coronaries, he has elevated pearly pressure suggestive of pulmonary hypertension.       from: Cardiac Cath Lab - Adult 08.24.17   Angiographic Findings     Cardiac Arteries and Lesion Findings    LMCA: Normal.Normal.    LAD: Normal.    LCx: Normal.    RCA: Normal.    Ramus: Normal.    VA  LV function assessed as:Normal.  Ejection Fraction  +----------------------------------------------------------------------+---  +  !Method                                                                  !EF%!  +----------------------------------------------------------------------+---  +  !LV gram             !65 !  +----------------------------------------------------------------------+---  +     Impression     Diagnostic Conclusions     Normal LV systolic function. Estimated LV ejection fraction is 65 %.   Normal coronary arteries.   Elevated left ventricular end-diastolic pressure.   Hemodynamic status is abnormal.   Severe pulmonary artery hypertension      PAST MEDICAL & SURGICAL HISTORY:  Neuritis  GERALD (obstructive sleep apnea)  Osteoarthritis  Chronic back pain greater than 3 months duration  Asthma  HTN (hypertension)  S/P hip replacement: bilateral  S/P spinal surgery  S/P appendectomy  MEDICATIONS  (STANDING):  enoxaparin Injectable 40 milliGRAM(s) SubCutaneous every 24 hours  aspirin enteric coated 81 milliGRAM(s) Oral daily  docusate sodium 100 milliGRAM(s) Oral three times a day  ALBUTerol/ipratropium for Nebulization 3 milliLiter(s) Nebulizer every 6 hours  morphine ER Tablet 75 milliGRAM(s) Oral three times a day  losartan 25 milliGRAM(s) Oral daily  sertraline 100 milliGRAM(s) Oral two times a day  furosemide    Tablet 40 milliGRAM(s) Oral daily  montelukast 10 milliGRAM(s) Oral at bedtime  folic acid 1 milliGRAM(s) Oral daily    MEDICATIONS  (PRN):  acetaminophen   Tablet 650 milliGRAM(s) Oral every 6 hours PRN For Temp greater than 38.5 C (101.3 F)  acetaminophen   Tablet. 650 milliGRAM(s) Oral every 6 hours PRN Mild Pain (1 - 3)  senna 2 Tablet(s) Oral at bedtime PRN Constipation  ALBUTerol    0.083% 2.5 milliGRAM(s) Nebulizer every 3 hours PRN Shortness of Breath and/or Wheezing  ALPRAZolam 0.25 milliGRAM(s) Oral three times a day PRN anxiety.          REVIEW OF SYSTEM:  Pertinent items are noted in HPI.  Constitutional	Negative for chills, fevers, sweats.    Eyes: 	Negative for visual disturbance.  Ears, nose, mouth, throat, and face: Negative for epistaxis, nasal congestion, sore throat and tinnitus.  Neck:	Negative for enlargement, pain and difficulty in swallowing  Respiration : Negative for cough,  pleuritic chest pain and wheezing , he has dyspnea on exertion  Cardiovascular: Negative for chest pain, and palpitations    Gastrointestinal : Negative for abdominal pain, diarrhea, nausea and vomiting  Genitourinary: Negative for dysuria, frequency and urinary incontinence .  Skin: Negative for  rash, pruritus, swelling, dryness .  	  Hematologic/lymphatic: Negative for bleeding and easy bruising  Musculoskeletal: Negative for arthralgias, back pain and muscle weakness.  Neurological: Negative for dizziness, headaches, seizures and tremors  Behavioral/Psych: Negative for mood change, depression.  Endocrine:	Negative for blurry vision, polydipsia and polyuria, diaphoresis.   Allergic/Immunologic:	Negative for anaphylaxis, angioedema and urticaria.      Vital Signs Last 24 Hrs  T(C): 36.8 (25 Aug 2017 10:25), Max: 36.8 (24 Aug 2017 16:10)  T(F): 98.2 (25 Aug 2017 10:25), Max: 98.3 (24 Aug 2017 16:10)  HR: 80 (25 Aug 2017 10:25) (58 - 585)  BP: 137/75 (25 Aug 2017 10:25) (133/69 - 175/677)  BP(mean): 82 (24 Aug 2017 20:00) (82 - 100)  RR: 18 (25 Aug 2017 10:25) (18 - 181)  SpO2: 96% (25 Aug 2017 10:25) (95% - 99%)        PHYSICAL EXAM  General Appearance: cooperative, no acute distress,   HEENT: PERRL, conjunctiva clear, EOM's intact, non injected pharynx, no exudate.  Neck: Supple, , no adenopathy, thyroid: not enlarged, no carotid bruit or JVD  Back: Symmetric, no  tenderness,no soft tissue tenderness  Lungs: Clear to auscultation bilateral,no adventitious breath sounds, normal   expiratory phase  Heart: Regular rate and rhythm, S1, S2 normal, no murmur, rub or gallop  Abdomen: Soft, non-tender, bowel sounds active , no hepatosplenomegaly  Extremities: no cyanosis or edema, no joint swelling , right wrist normal.  Skin: Skin color, texture normal, no rashes   Neurologic: Alert and oriented X3 , cranial nerves intact, sensory and motor normal,        INTERPRETATION OF TELEMETRY: NSR          LABS:                          10.3   9.0   )-----------( 156      ( 25 Aug 2017 06:55 )             33.1     08-25    140  |  94<L>  |  19  ----------------------------<  97  3.6   |  41<H>  |  0.53    Ca    9.2      25 Aug 2017 06:55          Lipid Panel  149  61  76  60    Pro BNP  -- 08-22 @ 16:38  D Dimer  231 08-22 @ 16:38  Pro BNP  124 08-22 @ 10:26  D Dimer  -- 08-22 @ 10:26
Patient is a 61y old  Male who presents with a chief complaint of shortness of breath and anxiety. (22 Aug 2017 14:38)      HPI:  61M.  hx chronic LBP due to OA-has been opioid requiring > 10 years.  usual state of health until 1 week ago.  according to patient, Dr. Crocker had tapered his MS Contin 100mg po tid to 60mg po tid.  since, he has become increasingly anxious.  3 days ago, patient developed a cough, jittery, severe anxiety and shortness of breath which has progressed.  This AM, patient had chest pain was was brought to the ED by his two sons.  in ED, was given BD nebs and SoluMedrol.    pt is seen and examined on [facility] today  he feels anxious and typically declines any cpap or biapap setting or any form of masks    s/p cath, data rev with Dr Hunter yesterday  pt is informed of severe pulm htn dx and need for further eval and fu as outpt    8/26  feels better  he wants to go ghome and fu as outpt  Dx and pulm HTN discussed with need for further eval and treatment discussed as well.  he is ambulating with supplemental o2 therapy    ROS:  (+) anxiety.  (+) cough-dry and nonproductive.  (-) chest pain at current time.  (-) fever, chills or rigors.  (+) LUTS.  (-) melena, hematochezia, hemoptemesis.  (-) hematuria.  (-) hemoptysis.    PMHx:  hypoxia respiratory failure-O2 dependence;  GERALD;  asthma;  HTN;  "chest congestion and leg swelling" w/o cardiac w/u (denies stress test, angiogram, hx of MI, stents or CABG);  chronic LBP due to severe OA-malfunctioning spinal cord stimulator.    PSHx:  AP;  b/l hip replacement;  lamenectomy.    Rx:  reveiwed.    SocHx:  lives in the community.   (lost his wife ~1 year ago).  no tobacco.  no current EtOH.    FHx:  no 1st degree relative w/ CAD. (22 Aug 2017 14:38)      PAST MEDICAL & SURGICAL HISTORY:  Neuritis  GERALD (obstructive sleep apnea)  Osteoarthritis  Chronic back pain greater than 3 months duration  Asthma  HTN (hypertension)  S/P hip replacement: bilateral  S/P spinal surgery  S/P appendectomy      PREVIOUS DIAGNOSTIC TESTING:      MEDICATIONS  (STANDING):  enoxaparin Injectable 40 milliGRAM(s) SubCutaneous every 24 hours  aspirin enteric coated 81 milliGRAM(s) Oral daily  docusate sodium 100 milliGRAM(s) Oral three times a day  ALBUTerol/ipratropium for Nebulization 3 milliLiter(s) Nebulizer every 6 hours  morphine ER Tablet 75 milliGRAM(s) Oral three times a day  losartan 25 milliGRAM(s) Oral daily  sertraline 100 milliGRAM(s) Oral two times a day  furosemide    Tablet 40 milliGRAM(s) Oral daily  montelukast 10 milliGRAM(s) Oral at bedtime  folic acid 1 milliGRAM(s) Oral daily    MEDICATIONS  (PRN):  acetaminophen   Tablet 650 milliGRAM(s) Oral every 6 hours PRN For Temp greater than 38.5 C (101.3 F)  acetaminophen   Tablet. 650 milliGRAM(s) Oral every 6 hours PRN Mild Pain (1 - 3)  senna 2 Tablet(s) Oral at bedtime PRN Constipation  ALBUTerol    0.083% 2.5 milliGRAM(s) Nebulizer every 3 hours PRN Shortness of Breath and/or Wheezing  ALPRAZolam 0.25 milliGRAM(s) Oral three times a day PRN anxiety.      FAMILY HISTORY:  No pertinent family history in first degree relatives        REVIEW OF SYSTEM:  Pertinent items are noted in HPI.  Constitutional negative for chills, fevers, sweats and weight loss  throat, and face:  negative for epistaxis,pos nasal congestion, sore throat and   tinnitus  Respiratory:pos for cough, dyspnea on exertion, no pleuritic chest pain  and wheezing  Cardiovascular:  negative for chest pain, dyspnea and palpitations  Gastrointestinal: negative for abdominal pain, diarrhea, nausea and vomiting  Genitourinary: negative for dysuria, frequency and urinary incontinence  Skin:  negative for redness, rash, pruritus, swelling, dryness and   fissures  Hematologic/lymphatic: negative for bleeding and easy bruising  Musculoskeletal: pos for arthralgias, back pain and muscle weakness  Neurological: negative for dizziness, headaches, seizures and tremors      Vital Signs Last 24 Hrs  T(C): 36.4 (26 Aug 2017 10:32), Max: 37.3 (26 Aug 2017 05:06)  T(F): 97.5 (26 Aug 2017 10:32), Max: 99.2 (26 Aug 2017 05:06)  HR: 65 (26 Aug 2017 10:32) (60 - 103)  BP: 132/55 (26 Aug 2017 10:32) (132/55 - 158/70)  BP(mean): --  RR: 18 (26 Aug 2017 10:32) (18 - 18)  SpO2: 99% (26 Aug 2017 10:32) (96% - 99%)  I&O's Summary  I&O's Summary  I&O's Detail      PHYSICAL EXAM  General Appearance: cooperative, no acute distress, obese with difficulty sitting up for exam in bed  HEENT: PERRL, conjunctiva clear, EOM's intact,  Neck: Supple, , no adenopathy, thyroid: not enlarged, no carotid bruit or JVD  Back: Symmetric, no  tenderness,no soft tissue tenderness  Lungs: decreased breath sounds in mid to lower lung fileds, no wheezing  Heart: Regular rate and rhythm, S1, S2 normal, no murmur, rub or gallop  Abdomen: Soft, non-tender, bowel sounds active , no hepatosplenomegaly  Extremities: no cyanosis but pos lower ext edema, no joint swelling  Skin: Skin color, texture normal, no rashes   Neurologic: Alert and oriented X3 , cranial nerves intact, sensory and motor normal,    ECG:    LABS:                          9.8    7.7   )-----------( 143      ( 23 Aug 2017 06:44 )             31.2     08-23    140  |  97  |  14  ----------------------------<  97  3.5   |  44<H>  |  0.45<L>            < from: CT Angio Chest PE Protocol w/ IV Cont (08.22.17 @ 15:13) >  INTERPRETATION:  EXAMINATION DATE: August 22, 2017 at 1509 hours.  CLINICAL INFORMATION: Shortness of breath.    COMPARISON: None.    PROCEDURE:   CT Angiography of the Chest.  Intravenous contrast: 90 mL Omnipaque 350. 10 mL discarded.  Sagittal and coronal reformats were performed as well as MIPS.    FINDINGS:    LUNGS AND LARGE AIRWAYS: Patent central airways. No pulmonary nodules.  PLEURA: No pleural effusion.  VESSELS: Poor contrast opacification of the pulmonary arteries which are   only visualized to the level of the central main and lobar arteries. No   central pulmonary embolus.   HEART: The heart is enlarged. No pericardial effusion.  MEDIASTINUM AND CAMILLE: No lymphadenopathy.  CHEST WALL AND LOWER NECK: Within normal limits.  VISUALIZED UPPER ABDOMEN: Within normal limits.  BONES: Degenerative changes of the spine.    IMPRESSION: For contrast opacification of the pulmonary arteries. No   large or central pulmonary embolism.    < end of copied text >      RADIOLOGY & ADDITIONAL STUDIES:    ABG - ( 22 Aug 2017 18:37 )  pH: 7.36  /  pCO2: 69    /  pO2: 77    / HCO3: 38    / Base Excess: 11    /  SaO2: 95                                        9.8    7.7   )-----------( 143      ( 23 Aug 2017 06:44 )             31.2     08-23    140  |  97  |  14  ----------------------------<  97  3.5   |  44<H>  |  0.45<L>    Ca    9.1      23 Aug 2017 06:44    TPro  6.4  /  Alb  2.9<L>  /  TBili  0.3  /  DBili  x   /  AST  14<L>  /  ALT  27  /  AlkPhos  85  08-22    CARDIAC MARKERS ( 22 Aug 2017 16:38 )  <0.015 ng/mL / x     / x     / x     / x      CARDIAC MARKERS ( 22 Aug 2017 13:14 )  <0.015 ng/mL / x     / x     / x     / x      CARDIAC MARKERS ( 22 Aug 2017 10:26 )  <0.015 ng/mL / x     / x     / x     / x            Pro BNP  -- 08-22 @ 16:38  D Dimer  231 08-22 @ 16:38  Pro BNP  124 08-22 @ 10:26  D Dimer  -- 08-22 @ 10:26    PT/INR - ( 22 Aug 2017 10:26 )   PT: 11.0 sec;   INR: 1.02 ratio         PTT - ( 22 Aug 2017 10:26 )  PTT:32.3 sec    ABG - ( 22 Aug 2017 18:37 )  pH: 7.36  /  pCO2: 69    /  pO2: 77    / HCO3: 38    / Base Excess: 11    /  SaO2: 95                    RADIOLOGY & ADDITIONAL STUDIES:
Patient is a 61y old  Male who presents with a chief complaint of shortness of breath and anxiety. (22 Aug 2017 14:38)      HPI:  61M.  hx chronic LBP due to OA-has been opioid requiring > 10 years.  usual state of health until 1 week ago.  according to patient, Dr. Crocker had tapered his MS Contin 100mg po tid to 60mg po tid.  since, he has become increasingly anxious.  3 days ago, patient developed a cough, jittery, severe anxiety and shortness of breath which has progressed.  This AM, patient had chest pain was was brought to the ED by his two sons.  in ED, was given BD nebs and SoluMedrol.    pt is seen and examined on [facility] today  he feels anxious and typically declines any cpap or biapap setting or any form of masks    s/p cath, data rev with Dr Hunter yesterday  pt is informed of severe pulm htn dx and need for further eval and fu as outpt    ROS:  (+) anxiety.  (+) cough-dry and nonproductive.  (-) chest pain at current time.  (-) fever, chills or rigors.  (+) LUTS.  (-) melena, hematochezia, hemoptemesis.  (-) hematuria.  (-) hemoptysis.    PMHx:  hypoxia respiratory failure-O2 dependence;  GERALD;  asthma;  HTN;  "chest congestion and leg swelling" w/o cardiac w/u (denies stress test, angiogram, hx of MI, stents or CABG);  chronic LBP due to severe OA-malfunctioning spinal cord stimulator.    PSHx:  AP;  b/l hip replacement;  lamenectomy.    Rx:  reveiwed.    SocHx:  lives in the community.   (lost his wife ~1 year ago).  no tobacco.  no current EtOH.    FHx:  no 1st degree relative w/ CAD. (22 Aug 2017 14:38)      PAST MEDICAL & SURGICAL HISTORY:  Neuritis  GERALD (obstructive sleep apnea)  Osteoarthritis  Chronic back pain greater than 3 months duration  Asthma  HTN (hypertension)  S/P hip replacement: bilateral  S/P spinal surgery  S/P appendectomy      PREVIOUS DIAGNOSTIC TESTING:      MEDICATIONS  (STANDING):  enoxaparin Injectable 40 milliGRAM(s) SubCutaneous every 24 hours  aspirin enteric coated 81 milliGRAM(s) Oral daily  docusate sodium 100 milliGRAM(s) Oral three times a day  ALBUTerol/ipratropium for Nebulization 3 milliLiter(s) Nebulizer every 6 hours  morphine ER Tablet 75 milliGRAM(s) Oral three times a day  losartan 25 milliGRAM(s) Oral daily  sertraline 100 milliGRAM(s) Oral two times a day  furosemide    Tablet 40 milliGRAM(s) Oral daily  montelukast 10 milliGRAM(s) Oral at bedtime  folic acid 1 milliGRAM(s) Oral daily    MEDICATIONS  (PRN):  acetaminophen   Tablet 650 milliGRAM(s) Oral every 6 hours PRN For Temp greater than 38.5 C (101.3 F)  acetaminophen   Tablet. 650 milliGRAM(s) Oral every 6 hours PRN Mild Pain (1 - 3)  senna 2 Tablet(s) Oral at bedtime PRN Constipation  ALBUTerol    0.083% 2.5 milliGRAM(s) Nebulizer every 3 hours PRN Shortness of Breath and/or Wheezing  ALPRAZolam 0.25 milliGRAM(s) Oral three times a day PRN anxiety.      FAMILY HISTORY:  No pertinent family history in first degree relatives        REVIEW OF SYSTEM:  Pertinent items are noted in HPI.  Constitutional negative for chills, fevers, sweats and weight loss  throat, and face:  negative for epistaxis,pos nasal congestion, sore throat and   tinnitus  Respiratory:pos for cough, dyspnea on exertion, no pleuritic chest pain  and wheezing  Cardiovascular:  negative for chest pain, dyspnea and palpitations  Gastrointestinal: negative for abdominal pain, diarrhea, nausea and vomiting  Genitourinary: negative for dysuria, frequency and urinary incontinence  Skin:  negative for redness, rash, pruritus, swelling, dryness and   fissures  Hematologic/lymphatic: negative for bleeding and easy bruising  Musculoskeletal: pos for arthralgias, back pain and muscle weakness  Neurological: negative for dizziness, headaches, seizures and tremors      Vital Signs Last 24 Hrs  T(C): 36.5 (24 Aug 2017 20:00), Max: 36.8 (24 Aug 2017 16:10)  T(F): 97.7 (24 Aug 2017 20:00), Max: 98.3 (24 Aug 2017 16:10)  HR: 585 (24 Aug 2017 20:00) (58 - 585)  BP: 175/677 (24 Aug 2017 20:00) (133/69 - 175/677)  BP(mean): 82 (24 Aug 2017 20:00) (82 - 100)  RR: 181 (24 Aug 2017 20:00) (16 - 181)  SpO2: 96% (24 Aug 2017 20:00) (95% - 99%)    I&O's Summary    22 Aug 2017 07:01  -  23 Aug 2017 07:00  --------------------------------------------------------  IN: 0 mL / OUT: 510 mL / NET: -510 mL      PHYSICAL EXAM  General Appearance: cooperative, no acute distress, obese with difficulty sitting up for exam in bed  HEENT: PERRL, conjunctiva clear, EOM's intact,  Neck: Supple, , no adenopathy, thyroid: not enlarged, no carotid bruit or JVD  Back: Symmetric, no  tenderness,no soft tissue tenderness  Lungs: decreased breath sounds in mid to lower lung fileds, no wheezing  Heart: Regular rate and rhythm, S1, S2 normal, no murmur, rub or gallop  Abdomen: Soft, non-tender, bowel sounds active , no hepatosplenomegaly  Extremities: no cyanosis but pos lower ext edema, no joint swelling  Skin: Skin color, texture normal, no rashes   Neurologic: Alert and oriented X3 , cranial nerves intact, sensory and motor normal,    ECG:    LABS:    Patient is a 61y old  Male who presents with a chief complaint of shortness of breath and anxiety. (22 Aug 2017 14:38)      HPI:  61M.  hx chronic LBP due to OA-has been opioid requiring > 10 years.  usual state of health until 1 week ago.  according to patient, Dr. Crocker had tapered his MS Contin 100mg po tid to 60mg po tid.  since, he has become increasingly anxious.  3 days ago, patient developed a cough, jittery, severe anxiety and shortness of breath which has progressed.  This AM, patient had chest pain was was brought to the ED by his two sons.  in ED, was given BD nebs and SoluMedrol.    ROS:  (+) anxiety.  (+) cough-dry and nonproductive.  (-) chest pain at current time.  (-) fever, chills or rigors.  (+) LUTS.  (-) melena, hematochezia, hemoptemesis.  (-) hematuria.  (-) hemoptysis.    PMHx:  hypoxia respiratory failure-O2 dependence;  GERALD;  asthma;  HTN;  "chest congestion and leg swelling" w/o cardiac w/u (denies stress test, angiogram, hx of MI, stents or CABG);  chronic LBP due to severe OA-malfunctioning spinal cord stimulator.    PSHx:  AP;  b/l hip replacement;  lamenectomy.    Rx:  reveiwed.    SocHx:  lives in the community.   (lost his wife ~1 year ago).  no tobacco.  no current EtOH.    FHx:  no 1st degree relative w/ CAD. (22 Aug 2017 14:38)      PAST MEDICAL & SURGICAL HISTORY:  Neuritis  GERALD (obstructive sleep apnea)  Osteoarthritis  Chronic back pain greater than 3 months duration  Asthma  HTN (hypertension)  S/P hip replacement: bilateral  S/P spinal surgery  S/P appendectomy      PREVIOUS DIAGNOSTIC TESTING:      MEDICATIONS  (STANDING):  enoxaparin Injectable 40 milliGRAM(s) SubCutaneous every 24 hours  aspirin enteric coated 81 milliGRAM(s) Oral daily  docusate sodium 100 milliGRAM(s) Oral three times a day  ALBUTerol/ipratropium for Nebulization 3 milliLiter(s) Nebulizer every 6 hours  morphine ER Tablet 75 milliGRAM(s) Oral three times a day  losartan 25 milliGRAM(s) Oral daily  sertraline 100 milliGRAM(s) Oral two times a day  furosemide    Tablet 40 milliGRAM(s) Oral daily  montelukast 10 milliGRAM(s) Oral at bedtime  folic acid 1 milliGRAM(s) Oral daily    MEDICATIONS  (PRN):  acetaminophen   Tablet 650 milliGRAM(s) Oral every 6 hours PRN For Temp greater than 38.5 C (101.3 F)  acetaminophen   Tablet. 650 milliGRAM(s) Oral every 6 hours PRN Mild Pain (1 - 3)  senna 2 Tablet(s) Oral at bedtime PRN Constipation  ALBUTerol    0.083% 2.5 milliGRAM(s) Nebulizer every 3 hours PRN Shortness of Breath and/or Wheezing  ALPRAZolam 0.25 milliGRAM(s) Oral three times a day PRN anxiety.      FAMILY HISTORY:  No pertinent family history in first degree relatives      SOCIAL HISTORY:  ***    REVIEW OF SYSTEM:  Pertinent items are noted in HPI.  Constitutional negative for chills, fevers, sweats and weight loss  throat, and face:  negative for epistaxis, nasal congestion, sore throat and   tinnitus  Respiratory: negative for cough, dyspnea on exertion, pleuritic chest pain  and wheezing  Cardiovascular:  negative for chest pain, dyspnea and palpitations  Gastrointestinal: negative for abdominal pain, diarrhea, nausea and vomiting  Genitourinary: negative for dysuria, frequency and urinary incontinence  Skin:  negative for redness, rash, pruritus, swelling, dryness and   fissures  Hematologic/lymphatic: negative for bleeding and easy bruising  Musculoskeletal: negative for arthralgias, back pain and muscle weakness  Neurological: negative for dizziness, headaches, seizures and tremors  Behavioral/Psych:  negative for mood change, depression, suicidal attempts    Allergic/Immunologic: negative for anaphylaxis, angioedema and urticaria    Vital Signs Last 24 Hrs  T(C): 36.7 (23 Aug 2017 04:10), Max: 36.7 (22 Aug 2017 09:49)  T(F): 98 (23 Aug 2017 04:10), Max: 98.1 (22 Aug 2017 18:15)  HR: 57 (23 Aug 2017 08:05) (50 - 64)  BP: 160/71 (23 Aug 2017 05:37) (126/49 - 161/68)  BP(mean): 99 (22 Aug 2017 14:38) (99 - 99)  RR: 17 (23 Aug 2017 05:37) (17 - 18)  SpO2: 100% (23 Aug 2017 05:37) (93% - 100%)    I&O's Summary    22 Aug 2017 07:01  -  23 Aug 2017 07:00  --------------------------------------------------------  IN: 0 mL / OUT: 510 mL / NET: -510 mL      PHYSICAL EXAM  General Appearance: cooperative, no acute distress,   HEENT: PERRL, conjunctiva clear, EOM's intact, non injected pharynx, no exudate, TM   normal  Neck: Supple, , no adenopathy, thyroid: not enlarged, no carotid bruit or JVD  Back: Symmetric, no  tenderness,no soft tissue tenderness  Lungs: Clear to auscultation bilateral,no adventitious breath sounds, normal   expiratory phase  Heart: Regular rate and rhythm, S1, S2 normal, no murmur, rub or gallop  Abdomen: Soft, non-tender, bowel sounds active , no hepatosplenomegaly  Extremities: no cyanosis or edema, no joint swelling  Skin: Skin color, texture normal, no rashes   Neurologic: Alert and oriented X3 , cranial nerves intact, sensory and motor normal,    ECG:    LABS:                          9.8    7.7   )-----------( 143      ( 23 Aug 2017 06:44 )             31.2     08-23    140  |  97  |  14  ----------------------------<  97  3.5   |  44<H>  |  0.45<L>    Ca    9.1      23 Aug 2017 06:44    TPro  6.4  /  Alb  2.9<L>  /  TBili  0.3  /  DBili  x   /  AST  14<L>  /  ALT  27  /  AlkPhos  85  08-22    CARDIAC MARKERS ( 22 Aug 2017 16:38 )  <0.015 ng/mL / x     / x     / x     / x      CARDIAC MARKERS ( 22 Aug 2017 13:14 )  <0.015 ng/mL / x     / x     / x     / x      CARDIAC MARKERS ( 22 Aug 2017 10:26 )  <0.015 ng/mL / x     / x     / x     / x            Pro BNP  -- 08-22 @ 16:38  D Dimer  231 08-22 @ 16:38  Pro BNP  124 08-22 @ 10:26  D Dimer  -- 08-22 @ 10:26    PT/INR - ( 22 Aug 2017 10:26 )   PT: 11.0 sec;   INR: 1.02 ratio         PTT - ( 22 Aug 2017 10:26 )  PTT:32.3 sec    ABG - ( 22 Aug 2017 18:37 )  pH: 7.36  /  pCO2: 69    /  pO2: 77    / HCO3: 38    / Base Excess: 11    /  SaO2: 95                < from: CT Angio Chest PE Protocol w/ IV Cont (08.22.17 @ 15:13) >  INTERPRETATION:  EXAMINATION DATE: August 22, 2017 at 1509 hours.  CLINICAL INFORMATION: Shortness of breath.    COMPARISON: None.    PROCEDURE:   CT Angiography of the Chest.  Intravenous contrast: 90 mL Omnipaque 350. 10 mL discarded.  Sagittal and coronal reformats were performed as well as MIPS.    FINDINGS:    LUNGS AND LARGE AIRWAYS: Patent central airways. No pulmonary nodules.  PLEURA: No pleural effusion.  VESSELS: Poor contrast opacification of the pulmonary arteries which are   only visualized to the level of the central main and lobar arteries. No   central pulmonary embolus.   HEART: The heart is enlarged. No pericardial effusion.  MEDIASTINUM AND CAMILLE: No lymphadenopathy.  CHEST WALL AND LOWER NECK: Within normal limits.  VISUALIZED UPPER ABDOMEN: Within normal limits.  BONES: Degenerative changes of the spine.    IMPRESSION: For contrast opacification of the pulmonary arteries. No   large or central pulmonary embolism.    < end of copied text >      RADIOLOGY & ADDITIONAL STUDIES:    ABG - ( 22 Aug 2017 18:37 )  pH: 7.36  /  pCO2: 69    /  pO2: 77    / HCO3: 38    / Base Excess: 11    /  SaO2: 95                                        9.8    7.7   )-----------( 143      ( 23 Aug 2017 06:44 )             31.2     08-23    140  |  97  |  14  ----------------------------<  97  3.5   |  44<H>  |  0.45<L>    Ca    9.1      23 Aug 2017 06:44    TPro  6.4  /  Alb  2.9<L>  /  TBili  0.3  /  DBili  x   /  AST  14<L>  /  ALT  27  /  AlkPhos  85  08-22    CARDIAC MARKERS ( 22 Aug 2017 16:38 )  <0.015 ng/mL / x     / x     / x     / x      CARDIAC MARKERS ( 22 Aug 2017 13:14 )  <0.015 ng/mL / x     / x     / x     / x      CARDIAC MARKERS ( 22 Aug 2017 10:26 )  <0.015 ng/mL / x     / x     / x     / x            Pro BNP  -- 08-22 @ 16:38  D Dimer  231 08-22 @ 16:38  Pro BNP  124 08-22 @ 10:26  D Dimer  -- 08-22 @ 10:26    PT/INR - ( 22 Aug 2017 10:26 )   PT: 11.0 sec;   INR: 1.02 ratio         PTT - ( 22 Aug 2017 10:26 )  PTT:32.3 sec    ABG - ( 22 Aug 2017 18:37 )  pH: 7.36  /  pCO2: 69    /  pO2: 77    / HCO3: 38    / Base Excess: 11    /  SaO2: 95                    RADIOLOGY & ADDITIONAL STUDIES:
shortness of breath and anxiety.    61M.  hx chronic LBP due to OA-has been opioid requiring > 10 years.  usual state of health until 1 week ago.  according to patient, Dr. Crocker had tapered his MS Contin 100mg po tid to 60mg po tid.  since, he has become increasingly anxious.  3 days ago, patient developed a cough, jittery, severe anxiety and shortness of breath which has progressed.  This AM, patient had chest pain was was brought to the ED by his two sons.  in ED, was given BD nebs and SoluMedrol.    ROS:  (+) anxiety.  (+) cough-dry and nonproductive.  (-) chest pain at current time.  (-) fever, chills or rigors.  (+) LUTS.  (-) melena, hematochezia, hemoptemesis.  (-) hematuria.  (-) hemoptysis.    PMHx:  hypoxia respiratory failure-O2 dependence;  GERALD;  asthma;  HTN;  "chest congestion and leg swelling" w/o cardiac w/u (denies stress test, angiogram, hx of MI, stents or CABG);  chronic LBP due to severe OA-malfunctioning spinal cord stimulator.    08/23-son at bedside.  no complaints.    08/24-overall feeling better.  unable to tolerate BiPAP due to claustrophobia.    lungs clear  heart regular  abd soft  ext no edema  neuro nonfocal     MEDICATIONS  (STANDING):  enoxaparin Injectable 40 milliGRAM(s) SubCutaneous every 24 hours  aspirin enteric coated 81 milliGRAM(s) Oral daily  docusate sodium 100 milliGRAM(s) Oral three times a day  ALBUTerol/ipratropium for Nebulization 3 milliLiter(s) Nebulizer every 6 hours  morphine ER Tablet 75 milliGRAM(s) Oral three times a day  losartan 25 milliGRAM(s) Oral daily  sertraline 100 milliGRAM(s) Oral two times a day  furosemide    Tablet 40 milliGRAM(s) Oral daily  montelukast 10 milliGRAM(s) Oral at bedtime  folic acid 1 milliGRAM(s) Oral daily    MEDICATIONS  (PRN):  acetaminophen   Tablet 650 milliGRAM(s) Oral every 6 hours PRN For Temp greater than 38.5 C (101.3 F)  acetaminophen   Tablet. 650 milliGRAM(s) Oral every 6 hours PRN Mild Pain (1 - 3)  senna 2 Tablet(s) Oral at bedtime PRN Constipation  ALBUTerol    0.083% 2.5 milliGRAM(s) Nebulizer every 3 hours PRN Shortness of Breath and/or Wheezing  ALPRAZolam 0.25 milliGRAM(s) Oral three times a day PRN anxiety.    Vital Signs Last 24 Hrs  T(C): 36.8 (24 Aug 2017 16:10), Max: 36.8 (24 Aug 2017 07:30)  T(F): 98.3 (24 Aug 2017 16:10), Max: 98.3 (24 Aug 2017 07:30)  HR: 73 (24 Aug 2017 16:10) (58 - 78)  BP: 153/82 (24 Aug 2017 16:10) (133/69 - 161/66)  BP(mean): 100 (24 Aug 2017 16:10) (100 - 100)  RR: 18 (24 Aug 2017 16:10) (16 - 18)  SpO2: 99% (24 Aug 2017 16:10) (93% - 99%)                        9.8    7.7   )-----------( 143      ( 23 Aug 2017 06:44 )             31.2       08-23    140  |  97  |  14  ----------------------------<  97  3.5   |  44<H>  |  0.45<L>    Ca    9.1      23 Aug 2017 06:44    shortness of breath and anxiety.  hx panic attacks.  -favor opioid withdrawel syndrome due to recent decrease in MS Contin.  -check urine toxicology.  -increased MS Contin 75mg po tid.  -c/w Zoloft and Xanax.  -d/w Dr. Antonio Crocker.  agrees with above.  -Psychiatry consult.    hypoxia.  hx GERALD and chronic hypoxic respiratory failure.  hx asthma.  -severe pHTN demonstrated on cath.  -CXR (-) acute findings.  -ABG.  -D-dimer.  -b/l LE venous dopplers, r/o DVT.  -CTA.  -TTE r/o pHTN.  -has not tolerated BiPAP in the past.  -DUO nebs q6h + DELMI q3h PRN.  -Pulmonary input appreciated.    chest pain.  -telemetry.  -TnI (-).  -EKG (-) acute changes.  -ASA.  -Cardiology consult.    normocytic anemia.  -Fe studies.  -B12.  folate.  -reticulocyte coung.  -stool OB.  -f/u AM CBC.    hx chest congestion and LE edema.  -BNP wnl.  -TTE.  -c/w Lasix.    hx morbid obesity.  -A1C.  -TSH.  -lifestyle modifications.    disposition.  -telemetry unit.
shortness of breath and anxiety.    61M.  hx chronic LBP due to OA-has been opioid requiring > 10 years.  usual state of health until 1 week ago.  according to patient, Dr. Crocker had tapered his MS Contin 100mg po tid to 60mg po tid.  since, he has become increasingly anxious.  3 days ago, patient developed a cough, jittery, severe anxiety and shortness of breath which has progressed.  This AM, patient had chest pain was was brought to the ED by his two sons.  in ED, was given BD nebs and SoluMedrol.    ROS:  (+) anxiety.  (+) cough-dry and nonproductive.  (-) chest pain at current time.  (-) fever, chills or rigors.  (+) LUTS.  (-) melena, hematochezia, hemoptemesis.  (-) hematuria.  (-) hemoptysis.    PMHx:  hypoxia respiratory failure-O2 dependence;  GERALD;  asthma;  HTN;  "chest congestion and leg swelling" w/o cardiac w/u (denies stress test, angiogram, hx of MI, stents or CABG);  chronic LBP due to severe OA-malfunctioning spinal cord stimulator.    08/23-son at bedside.  no complaints.    lungs clear  heart regular  abd soft  ext no edema  neuro nonfocal     MEDICATIONS  (STANDING):  enoxaparin Injectable 40 milliGRAM(s) SubCutaneous every 24 hours  aspirin enteric coated 81 milliGRAM(s) Oral daily  docusate sodium 100 milliGRAM(s) Oral three times a day  ALBUTerol/ipratropium for Nebulization 3 milliLiter(s) Nebulizer every 6 hours  morphine ER Tablet 75 milliGRAM(s) Oral three times a day  losartan 25 milliGRAM(s) Oral daily  sertraline 100 milliGRAM(s) Oral two times a day  furosemide    Tablet 40 milliGRAM(s) Oral daily  montelukast 10 milliGRAM(s) Oral at bedtime  folic acid 1 milliGRAM(s) Oral daily    MEDICATIONS  (PRN):  acetaminophen   Tablet 650 milliGRAM(s) Oral every 6 hours PRN For Temp greater than 38.5 C (101.3 F)  acetaminophen   Tablet. 650 milliGRAM(s) Oral every 6 hours PRN Mild Pain (1 - 3)  senna 2 Tablet(s) Oral at bedtime PRN Constipation  ALBUTerol    0.083% 2.5 milliGRAM(s) Nebulizer every 3 hours PRN Shortness of Breath and/or Wheezing  ALPRAZolam 0.25 milliGRAM(s) Oral three times a day PRN anxiety.    Vital Signs Last 24 Hrs  T(C): 36.5 (23 Aug 2017 16:17), Max: 36.9 (23 Aug 2017 10:05)  T(F): 97.7 (23 Aug 2017 16:17), Max: 98.4 (23 Aug 2017 10:05)  HR: 60 (23 Aug 2017 16:17) (50 - 64)  BP: 138/71 (23 Aug 2017 16:17) (126/49 - 160/71)  BP(mean): --  RR: 17 (23 Aug 2017 16:17) (17 - 18)  SpO2: 99% (23 Aug 2017 16:17) (93% - 100%)                        9.8    7.7   )-----------( 143      ( 23 Aug 2017 06:44 )             31.2     PT/INR - ( 22 Aug 2017 10:26 )   PT: 11.0 sec;   INR: 1.02 ratio         PTT - ( 22 Aug 2017 10:26 )  PTT:32.3 sec  08-23    140  |  97  |  14  ----------------------------<  97  3.5   |  44<H>  |  0.45<L>    Ca    9.1      23 Aug 2017 06:44    TPro  6.4  /  Alb  2.9<L>  /  TBili  0.3  /  DBili  x   /  AST  14<L>  /  ALT  27  /  AlkPhos  85  08-22    CARDIAC MARKERS ( 22 Aug 2017 16:38 )  <0.015 ng/mL / x     / x     / x     / x      CARDIAC MARKERS ( 22 Aug 2017 13:14 )  <0.015 ng/mL / x     / x     / x     / x      CARDIAC MARKERS ( 22 Aug 2017 10:26 )  <0.015 ng/mL / x     / x     / x     / x              shortness of breath and anxiety.  hx panic attacks.  -favor opioid withdrawel syndrome due to recent decrease in MS Contin.  -check urine toxicology.  -increase MS Contin 75mg po tid.  -c/w Zoloft and Xanax.  -awaiting call back from Dr. Antonio Crocker.  message left w/ office staff.  call back requested.  -Psychiatry consult.    hypoxia.  hx GERALD and chronic hypoxic respiratory failure.  hx asthma.  -CXR (-) acute findings.  -ABG.  -D-dimer.  -b/l LE venous dopplers, r/o DVT.  -CTA.  -TTE r/o pHTN.  -has not tolerated BiPAP in the past.  -DUO nebs q6h + DELMI q3h PRN.  -Pulmonary consult.    chest pain.  -telemetry.  -TnI (-).  -EKG (-) acute changes.  -ASA.  -Cardiology consult.    normocytic anemia.  -Fe studies.  -B12.  folate.  -reticulocyte coung.  -stool OB.  -f/u AM CBC.    hx chest congestion and LE edema.  -BNP wnl.  -TTE.  -c/w Lasix.    hx morbid obesity.  -A1C.  -TSH.  -lifestyle modifications.    disposition.  -telemetry unit.

## 2017-08-26 NOTE — DISCHARGE NOTE ADULT - PLAN OF CARE
see below. taper with the intent to discontinue MS Conting and Xanax.  weight loss.  tolerate CPAP if possible.  Lasix.  follow up with pulmonary hypertension specialist. follow up with primary medical doctor regarding continued work up (including iron studies and stool for occult blood) and screenings (e.g. colon cancer and prostate cancer screenings).

## 2017-08-26 NOTE — PROGRESS NOTE ADULT - ASSESSMENT
61M.  hx chronic LBP due to OA-has been opioid requiring > 10 years.  usual state of health until 1 week ago.  according to patient, Dr. Crocker had tapered his MS Contin 100mg po tid to 60mg po tid.  since, he has become increasingly anxious.  3 days ago, patient developed a cough, jittery, severe anxiety and shortness of breath which has progressed.  This AM, patient had chest pain was was brought to the ED by his two sons.  in ED, was given BD nebs and SoluMedrol.    GERALD  Obesity Hypoventilation syndrome  Chronic hypercapnic / hypoxemic resp failure  Chronic pain syndrome on narcotics also cont factor  Rest pulm dysfunction  COPD without active bronchospasm  recent initiation of supplemental o2  eval for CHF and pulm HTN  s/p cath with evidence of severe pulm HTn        plan  trial BIPAP but likely unsuccessful with prior hx  ECHO  cardiac eval  walk with o2  DUOPNEB  supplemental o2 needed  incentive spirometry  will need outpt fu  overall prognosis remains guarded and pt is made aware of dx- shows understanding  needs WT loss  sleep study required as well  VQ scan to r/o chronic thromboembolic dz could not be completed but CTA and leg dopplers are negative  he will fu with Dr Lopez as outpt

## 2017-08-26 NOTE — DISCHARGE NOTE ADULT - CARE PROVIDER_API CALL
Mick Griggs), Family Medicine  [address] [place] [address]  Phone: [phone]  Fax: [phone]    Jose Lopez (MD), Internal Medicine  [address] [place] [address]  Phone: [phone]    Elroy Hunter), Cardiovascular Disease; Internal Medicine  [address] [place] [address]  Phone: [phone]  Fax: [phone]    Antonio Crocker), Anesthesiology; Pain Medicine  [address] [place] [address]  Phone: [phone]  Fax: [phone]

## 2017-08-29 ENCOUNTER — INPATIENT (INPATIENT)
Facility: HOSPITAL | Age: 62
LOS: 8 days | Discharge: HOME HEALTH | End: 2017-09-07
Attending: FAMILY MEDICINE | Admitting: FAMILY MEDICINE
Payer: MEDICARE

## 2017-08-29 VITALS
DIASTOLIC BLOOD PRESSURE: 62 MMHG | HEART RATE: 72 BPM | OXYGEN SATURATION: 96 % | RESPIRATION RATE: 15 BRPM | TEMPERATURE: 99 F | SYSTOLIC BLOOD PRESSURE: 145 MMHG

## 2017-08-29 DIAGNOSIS — I27.2 OTHER SECONDARY PULMONARY HYPERTENSION: ICD-10-CM

## 2017-08-29 LAB
ABO RH CONFIRMATION: SIGNIFICANT CHANGE UP
ADD ON TEST-SPECIMEN IN LAB: SIGNIFICANT CHANGE UP
ALBUMIN SERPL ELPH-MCNC: 3.2 G/DL — LOW (ref 3.3–5)
ALP SERPL-CCNC: 92 U/L — SIGNIFICANT CHANGE UP (ref 40–120)
ALT FLD-CCNC: 56 U/L — SIGNIFICANT CHANGE UP (ref 12–78)
AMMONIA BLD-MCNC: 53 UMOL/L — HIGH (ref 11–32)
AMPHET UR-MCNC: NEGATIVE — SIGNIFICANT CHANGE UP
ANION GAP SERPL CALC-SCNC: 2 MMOL/L — LOW (ref 5–17)
APAP SERPL-MCNC: <2 UG/ML — LOW (ref 10–30)
APPEARANCE UR: CLEAR — SIGNIFICANT CHANGE UP
APTT BLD: 31.3 SEC — SIGNIFICANT CHANGE UP (ref 27.5–37.4)
AST SERPL-CCNC: 21 U/L — SIGNIFICANT CHANGE UP (ref 15–37)
BACTERIA # UR AUTO: (no result)
BARBITURATES UR SCN-MCNC: NEGATIVE — SIGNIFICANT CHANGE UP
BASOPHILS # BLD AUTO: 0.1 K/UL — SIGNIFICANT CHANGE UP (ref 0–0.2)
BASOPHILS NFR BLD AUTO: 0.6 % — SIGNIFICANT CHANGE UP (ref 0–2)
BENZODIAZ UR-MCNC: POSITIVE — SIGNIFICANT CHANGE UP
BILIRUB SERPL-MCNC: 0.4 MG/DL — SIGNIFICANT CHANGE UP (ref 0.2–1.2)
BILIRUB UR-MCNC: NEGATIVE — SIGNIFICANT CHANGE UP
BLD GP AB SCN SERPL QL: SIGNIFICANT CHANGE UP
BUN SERPL-MCNC: 20 MG/DL — SIGNIFICANT CHANGE UP (ref 7–23)
CALCIUM SERPL-MCNC: 9 MG/DL — SIGNIFICANT CHANGE UP (ref 8.5–10.1)
CHLORIDE SERPL-SCNC: 93 MMOL/L — LOW (ref 96–108)
CK SERPL-CCNC: 51 U/L — SIGNIFICANT CHANGE UP (ref 26–308)
CO2 SERPL-SCNC: 44 MMOL/L — HIGH (ref 22–31)
COCAINE METAB.OTHER UR-MCNC: NEGATIVE — SIGNIFICANT CHANGE UP
COLOR SPEC: YELLOW — SIGNIFICANT CHANGE UP
CREAT SERPL-MCNC: 0.57 MG/DL — SIGNIFICANT CHANGE UP (ref 0.5–1.3)
DIFF PNL FLD: (no result)
EOSINOPHIL # BLD AUTO: 0.1 K/UL — SIGNIFICANT CHANGE UP (ref 0–0.5)
EOSINOPHIL NFR BLD AUTO: 1.1 % — SIGNIFICANT CHANGE UP (ref 0–6)
ETHANOL SERPL-MCNC: <10 MG/DL — SIGNIFICANT CHANGE UP (ref 0–10)
GLUCOSE SERPL-MCNC: 111 MG/DL — HIGH (ref 70–99)
GLUCOSE UR QL: NEGATIVE MG/DL — SIGNIFICANT CHANGE UP
HCT VFR BLD CALC: 32.6 % — LOW (ref 39–50)
HGB BLD-MCNC: 10.2 G/DL — LOW (ref 13–17)
INR BLD: 1.05 RATIO — SIGNIFICANT CHANGE UP (ref 0.88–1.16)
KETONES UR-MCNC: (no result)
LACTATE SERPL-SCNC: 0.6 MMOL/L — LOW (ref 0.7–2)
LEUKOCYTE ESTERASE UR-ACNC: (no result)
LYMPHOCYTES # BLD AUTO: 0.7 K/UL — LOW (ref 1–3.3)
LYMPHOCYTES # BLD AUTO: 7.8 % — LOW (ref 13–44)
MCHC RBC-ENTMCNC: 26 PG — LOW (ref 27–34)
MCHC RBC-ENTMCNC: 31.2 GM/DL — LOW (ref 32–36)
MCV RBC AUTO: 83.3 FL — SIGNIFICANT CHANGE UP (ref 80–100)
METHADONE UR-MCNC: NEGATIVE — SIGNIFICANT CHANGE UP
MONOCYTES # BLD AUTO: 0.4 K/UL — SIGNIFICANT CHANGE UP (ref 0–0.9)
MONOCYTES NFR BLD AUTO: 4.7 % — SIGNIFICANT CHANGE UP (ref 2–14)
NEUTROPHILS # BLD AUTO: 8.3 K/UL — HIGH (ref 1.8–7.4)
NEUTROPHILS NFR BLD AUTO: 85.9 % — HIGH (ref 43–77)
NITRITE UR-MCNC: NEGATIVE — SIGNIFICANT CHANGE UP
NT-PROBNP SERPL-SCNC: 138 PG/ML — HIGH (ref 0–125)
OPIATES UR-MCNC: POSITIVE — SIGNIFICANT CHANGE UP
PCP SPEC-MCNC: SIGNIFICANT CHANGE UP
PCP UR-MCNC: NEGATIVE — SIGNIFICANT CHANGE UP
PH UR: 6 — SIGNIFICANT CHANGE UP (ref 5–8)
PLATELET # BLD AUTO: 157 K/UL — SIGNIFICANT CHANGE UP (ref 150–400)
POTASSIUM SERPL-MCNC: 3.6 MMOL/L — SIGNIFICANT CHANGE UP (ref 3.5–5.3)
POTASSIUM SERPL-SCNC: 3.6 MMOL/L — SIGNIFICANT CHANGE UP (ref 3.5–5.3)
PROT SERPL-MCNC: 6.9 GM/DL — SIGNIFICANT CHANGE UP (ref 6–8.3)
PROT UR-MCNC: 30 MG/DL
PROTHROM AB SERPL-ACNC: 11.4 SEC — SIGNIFICANT CHANGE UP (ref 9.8–12.7)
RBC # BLD: 3.92 M/UL — LOW (ref 4.2–5.8)
RBC # FLD: 14.2 % — SIGNIFICANT CHANGE UP (ref 10.3–14.5)
RBC CASTS # UR COMP ASSIST: (no result) /HPF (ref 0–4)
SALICYLATES SERPL-MCNC: <1.7 MG/DL — LOW (ref 2.8–20)
SODIUM SERPL-SCNC: 139 MMOL/L — SIGNIFICANT CHANGE UP (ref 135–145)
SP GR SPEC: 1.02 — SIGNIFICANT CHANGE UP (ref 1.01–1.02)
THC UR QL: NEGATIVE — SIGNIFICANT CHANGE UP
TROPONIN I SERPL-MCNC: <0.015 NG/ML — SIGNIFICANT CHANGE UP (ref 0.01–0.04)
TROPONIN I SERPL-MCNC: <0.015 NG/ML — SIGNIFICANT CHANGE UP (ref 0.01–0.04)
TYPE + AB SCN PNL BLD: SIGNIFICANT CHANGE UP
UROBILINOGEN FLD QL: 1 MG/DL
WBC # BLD: 9.6 K/UL — SIGNIFICANT CHANGE UP (ref 3.8–10.5)
WBC # FLD AUTO: 9.6 K/UL — SIGNIFICANT CHANGE UP (ref 3.8–10.5)
WBC UR QL: (no result)

## 2017-08-29 PROCEDURE — 70450 CT HEAD/BRAIN W/O DYE: CPT | Mod: 26

## 2017-08-29 PROCEDURE — 99285 EMERGENCY DEPT VISIT HI MDM: CPT

## 2017-08-29 PROCEDURE — 71010: CPT | Mod: 26

## 2017-08-29 PROCEDURE — 93010 ELECTROCARDIOGRAM REPORT: CPT

## 2017-08-29 RX ORDER — MORPHINE SULFATE 50 MG/1
15 CAPSULE, EXTENDED RELEASE ORAL ONCE
Qty: 0 | Refills: 0 | Status: DISCONTINUED | OUTPATIENT
Start: 2017-08-29 | End: 2017-08-29

## 2017-08-29 RX ORDER — LACTULOSE 10 G/15ML
20 SOLUTION ORAL ONCE
Qty: 0 | Refills: 0 | Status: COMPLETED | OUTPATIENT
Start: 2017-08-29 | End: 2017-08-29

## 2017-08-29 RX ORDER — CEFTRIAXONE 500 MG/1
1 INJECTION, POWDER, FOR SOLUTION INTRAMUSCULAR; INTRAVENOUS ONCE
Qty: 0 | Refills: 0 | Status: COMPLETED | OUTPATIENT
Start: 2017-08-29 | End: 2017-08-29

## 2017-08-29 RX ORDER — SODIUM CHLORIDE 9 MG/ML
3 INJECTION INTRAMUSCULAR; INTRAVENOUS; SUBCUTANEOUS ONCE
Qty: 0 | Refills: 0 | Status: COMPLETED | OUTPATIENT
Start: 2017-08-29 | End: 2017-08-29

## 2017-08-29 RX ORDER — MORPHINE SULFATE 50 MG/1
4 CAPSULE, EXTENDED RELEASE ORAL ONCE
Qty: 0 | Refills: 0 | Status: DISCONTINUED | OUTPATIENT
Start: 2017-08-29 | End: 2017-08-29

## 2017-08-29 RX ADMIN — SODIUM CHLORIDE 3 MILLILITER(S): 9 INJECTION INTRAMUSCULAR; INTRAVENOUS; SUBCUTANEOUS at 17:31

## 2017-08-29 RX ADMIN — MORPHINE SULFATE 15 MILLIGRAM(S): 50 CAPSULE, EXTENDED RELEASE ORAL at 22:48

## 2017-08-29 RX ADMIN — CEFTRIAXONE 100 GRAM(S): 500 INJECTION, POWDER, FOR SOLUTION INTRAMUSCULAR; INTRAVENOUS at 21:37

## 2017-08-29 RX ADMIN — LACTULOSE 20 GRAM(S): 10 SOLUTION ORAL at 21:28

## 2017-08-29 NOTE — H&P ADULT - NSHPLABSRESULTS_GEN_ALL_CORE
10.2   9.6   )-----------( 157      ( 29 Aug 2017 17:30 )             32.6       CBC Full  -  ( 29 Aug 2017 17:30 )  WBC Count : 9.6 K/uL  Hemoglobin : 10.2 g/dL  Hematocrit : 32.6 %  Platelet Count - Automated : 157 K/uL  Mean Cell Volume : 83.3 fl  Mean Cell Hemoglobin : 26.0 pg  Mean Cell Hemoglobin Concentration : 31.2 gm/dL  Auto Neutrophil # : 8.3 K/uL  Auto Lymphocyte # : 0.7 K/uL  Auto Monocyte # : 0.4 K/uL  Auto Eosinophil # : 0.1 K/uL  Auto Basophil # : 0.1 K/uL  Auto Neutrophil % : 85.9 %  Auto Lymphocyte % : 7.8 %  Auto Monocyte % : 4.7 %  Auto Eosinophil % : 1.1 %  Auto Basophil % : 0.6 %          139  |  93<L>  |  20  ----------------------------<  111<H>  3.6   |  44<H>  |  0.57    Ca    9.0      29 Aug 2017 17:30    TPro  6.9  /  Alb  3.2<L>  /  TBili  0.4  /  DBili  x   /  AST  21  /  ALT  56  /  AlkPhos  92        LIVER FUNCTIONS - ( 29 Aug 2017 17:30 )  Alb: 3.2 g/dL / Pro: 6.9 gm/dL / ALK PHOS: 92 U/L / ALT: 56 U/L / AST: 21 U/L / GGT: x             PT/INR - ( 29 Aug 2017 17:30 )   PT: 11.4 sec;   INR: 1.05 ratio         PTT - ( 29 Aug 2017 17:30 )  PTT:31.3 sec    CARDIAC MARKERS ( 29 Aug 2017 23:56 )  <0.015 ng/mL / x     / x     / x     / x      CARDIAC MARKERS ( 29 Aug 2017 20:58 )  <0.015 ng/mL / x     / x     / x     / x      CARDIAC MARKERS ( 29 Aug 2017 17:30 )  <0.015 ng/mL / x     / 51 U/L / x     / x            Urinalysis Basic - ( 29 Aug 2017 20:06 )    Color: Yellow / Appearance: Clear / S.020 / pH: x  Gluc: x / Ketone: Small  / Bili: Negative / Urobili: 1 mg/dL   Blood: x / Protein: 30 mg/dL / Nitrite: Negative   Leuk Esterase: Trace / RBC: 3-5 /HPF / WBC 6-10   Sq Epi: x / Non Sq Epi: x / Bacteria: Few

## 2017-08-29 NOTE — ED PROVIDER NOTE - OBJECTIVE STATEMENT
62 y/o male with PMHx of asthma, HTN, CHF, GERALD, anxiety, spinal stenosis, herniated disks x2, arthritis, s/p hip replacement and spinal surgery presents to the ED c/o weakness. His son states he has fallen 4 times in past 24 hours because his left leg felt weak.  No syncope, LOC, did not hit head, no pain from fall.  Pt denies fever, HA, CP, abd pain.  He was previously admitted to the hospital for 5 days due to SOB and panic attack, discharged 2 days ago. He states he feels very shaky and his left leg feels weak.  He takes morphine for arthritic pain.  Questionable whether extra doses were taken for pain.  As per son: pt is not acting at baseline.  Pt appears less alert, more confused, poorly able to describe his condition.  Pt poor historian, history obtained via pt and his son. KHUSHBU. PCP Indu.

## 2017-08-29 NOTE — ED PROVIDER NOTE - CONSTITUTIONAL, MLM
normal... Overweight elderly white male, anxious, poorly able to express thought.  Ill appearing. mild respiratory distress.  well nourished, awake, oriented to person, place, time/situation

## 2017-08-29 NOTE — ED PROVIDER NOTE - NEUROLOGICAL, MLM
Anxious, poorly able to express his thoughts. No focal deficits, no motor or sensory deficits. Anxious, poorly able to express his thoughts. No focal deficits, no motor or sensory deficits.  A&O x3, speech normal. Cranial nerves II-XII intact

## 2017-08-29 NOTE — ED PROVIDER NOTE - MUSCULOSKELETAL, MLM
Neck nontender, supple.  No stiffness/meningismus.  Spine appears normal, range of motion is not limited, no muscle or joint tenderness Neck nontender, supple.  No stiffness/meningismus.  Spine appears normal, range of motion is not limited, no muscle or joint tenderness.  Extremities nontender.  FRANKEL x4, no focal swelling, tenderness.  B/l SLR 45 degrees with normal motor

## 2017-08-29 NOTE — ED ADULT NURSE REASSESSMENT NOTE - NS ED NURSE REASSESS COMMENT FT1
Morphine 15mg oral solution given per md order. pt tolerated well. Will cont to monitor for safety and comfort.

## 2017-08-29 NOTE — H&P ADULT - HISTORY OF PRESENT ILLNESS
61M.  hx chronic LBP due to OA-has been opioid requiring > 10 years.   ROS:  (+) anxiety.  (+) cough-dry and nonproductive.  (-) chest pain at current time.  (-) fever, chills or rigors.  (+) LUTS.  (-) melena, hematochezia, hemoptemesis.  (-) hematuria.  (-) hemoptysis.    PMHx:  hypoxia respiratory failure-O2 dependence;  GERALD;  asthma;  HTN;  "chest congestion and leg swelling" w/o cardiac w/u (denies stress test, angiogram, hx of MI, stents or CABG);  chronic LBP due to severe OA-malfunctioning spinal cord stimulator.    PSHx:  AP;  b/l hip replacement;  lamenectomy.    Rx:  reveiwed.    SocHx:  lives in the community.   (lost his wife ~1 year ago).  no tobacco.  no current EtOH.    FHx:  no 1st degree relative w/ CAD. 61M.  hx chronic LBP due to OA-has been opioid requiring > 10 years.   ROS:  (+) anxiety.  (+) cough-dry and nonproductive.  (-) chest pain at current time.  (-) fever, chills or rigors.  (+) LUTS.  (-) melena, hematochezia, hemoptemesis.  (-) hematuria.  (-) hemoptysis.   62 y/o male with PMHx of asthma, HTN, CHF, GERALD, anxiety, spinal stenosis, herniated disks x2, arthritis, s/p hip replacement and spinal surgery presents to the ED c/o weakness. His son states he has fallen 4 times in past 24 hours because his left leg felt weak.  No syncope, LOC, did not hit head, no pain from fall.  Pt denies fever, HA, CP, abd pain.  He was previously admitted to the hospital for 5 days due to SOB and panic attack, discharged 2 days ago. He states he feels very shaky and his left leg feels weak.  He takes morphine for arthritic pain.  Questionable whether extra doses were taken for pain.  As per son: pt is not acting at baseline.  Pt appears less alert, more confused, poorly able to describe his condition.  Pt poor historian, history obtained via pt and his son. NKDA. PCP Indu.    PMHx:  hypoxia respiratory failure-O2 dependence;  GERALD;  asthma;  HTN;  "chest congestion and leg swelling" w/o cardiac w/u (denies stress test, angiogram, hx of MI, stents or CABG);  chronic LBP due to severe OA-malfunctioning spinal cord stimulator.    PSHx:  AP;  b/l hip replacement;  lamenectomy.    Rx:  reveiwed.    SocHx:  lives in the community.   (lost his wife ~1 year ago).  no tobacco.  no current EtOH.    FHx:  no 1st degree relative w/ CAD. 61M.  hx chronic LBP due to OA-has been on long acting  opioid  > 10 years. asthma, HTN, pulmonary HTN on home O2 3 L, GERALD, anxiety, spinal stenosis, herniated disks x2, arthritis, s/p hip replacement and spinal surgery presents to the ED c/o weakness. His son states he has fallen 4 times in past 24 hours because his left leg felt weak.  No syncope, LOC, did not hit head, no pain from fall.  Pt denies fever, HA, CP, abd pain.  He was previously admitted to the hospital for 5 days 8/22- 8/26/17 due to SOB and panic attack found to be in opiate withdrawal and had cardiac cath for persistent SOB which showed pulmonary HTN,EF65%, discharged 2 days ago . He states he feels very shaky and his left leg feels weak.  He takes morphine for arthritic pain.  Questionable whether extra doses were taken for pain.  As per son: pt is not acting at baseline.  Pt appears less alert, more confused, poorly able to describe his condition.  Pt poor historian, history obtained via pt and his son.   In ED utox positive for opiate and benzo which are his long term meds, serum ammonia 53  CXR- poor inspiratory film   EKG sinus rhythm with sinus arrhythmia nonspecific ST/T waves, , troponin x2 negative  Head CT- no acute pathology  Was given lactulose and morphine in ED    Patient denies SOB,cough,fever,chills,CP,abdominal pain ,headache ,nausea,vomiting      PMHx:  hypoxia respiratory failure-O2 dependence;  GERALD;  asthma;  HTN;  "chest congestion and leg swelling" w/o cardiac w/u (denies stress test, angiogram, hx of MI, stents or CABG);  chronic LBP due to severe OA-malfunctioning spinal cord stimulator.    PSHx:  AP;  b/l hip replacement;  lamenectomy.    Rx:  reveiwed.    SocHx:  lives in the community.   (lost his wife ~1 year ago).  no tobacco.  no current EtOH.    FHx:  no 1st degree relative w/ CAD.

## 2017-08-29 NOTE — H&P ADULT - ASSESSMENT
61M.  hx chronic LBP due to OA-has been on long acting  opioid  > 10 years. asthma, HTN, pulmonary HTN on home O2 3 L, GERALD, anxiety, spinal stenosis, herniated disks x2, arthritis, s/p hip replacement and spinal surgery presents to the ED c/o weakness/confusion/recurrent falls x4 in past 24 hrs  due to ? LLE weakness which has now resolved ..     Assessment/plan    # Acute metabolic encephalopathy/Hyperammonemia/Multifactorial ? opiate/xanax intox vs dehydration (No evidence of hepatic encephalopathy clinically,LFT wnl)  Doubt serotonin syndrome (no clonus,or hypertonicity or hyperthermia,CK wnl)  doubt sepsis (afebrile/lactate wnl)/ ? UTI   doubt meningitis/encephalitis    -Admit to Medicine  -ABG, slow  cautious IV hydration if ammonia has not improved with lactulose  -CTabd pelvis routine r/o chronic  liver disease (no clinical signs of acute biliary patholgy or infection,no abdominal pain)  -Iv ceftraixoen x1 given in ED,defer further abx for now unless develops  fever or WBC rises or clinical infection signs ,f/u blood and urine cx  - hold long acting morphine ,will do IV morphine prn   -Xanax prn           #Recurrent falls in past 24 hrs /?LLE weakness which resolved  R/O TIA  - Tele admission  -JUDD,asa,lipid profile   -neuro consult  -cardio consult    #hx of Pulmonary HTN /chronic right sided heart failure/? chronic diastolic heart failure/Asthma  No clinical evidence of acute decompensated heart failure   - continue asa,losartan, will hold lasix for now due to possibility of dehydration  -neb prn     # chronic lower back pain /chronic anxiety  -will hold long acting morphine for now and give morphine prn due to possible overdosing of home  opiate  -Xanax prn    # DVt prophylaxis-  lovenox Sc 61M.  hx chronic LBP due to OA-has been on long acting  opioid  > 10 years. asthma, HTN, pulmonary HTN on home O2 3 L, GERALD, anxiety, spinal stenosis, herniated disks x2, arthritis, s/p hip replacement and spinal surgery presents to the ED c/o weakness/confusion/recurrent falls x4 in past 24 hrs  due to ? LLE weakness which has now resolved ..     Assessment/plan    # Acute metabolic encephalopathy/Hyperammonemia/Multifactorial ? opiate/xanax intox vs dehydration (No evidence of hepatic encephalopathy clinically,LFT wnl)  Doubt serotonin syndrome (no clonus,or hypertonicity or hyperthermia,CK wnl)  doubt sepsis (afebrile/lactate wnl)/ ? UTI   doubt meningitis/encephalitis    -Admit to Medicine  -ABG, slow  cautious IV hydration if ammonia has not improved with lactulose  -CTabd pelvis or abd US routine r/o chronic  liver disease (no clinical signs of acute biliary patholgy or infection,no abdominal pain)  -Iv ceftraixoen x1 given in ED,defer further abx for now unless develops  fever or WBC rises or clinical infection signs ,f/u blood and urine cx  - hold long acting morphine ,will do IV morphine prn   -Xanax prn           #Recurrent falls in past 24 hrs /?LLE weakness which resolved  R/O TIA  - Tele admission  -JUDD,asa,lipid profile   -neuro consult  -cardio consult    #hx of Pulmonary HTN /chronic right sided heart failure/? chronic diastolic heart failure/Asthma  No clinical evidence of acute decompensated heart failure ( wnl for patient;s age3  - continue asa,losartan, will hold lasix for now due to possibility of dehydration  -neb prn     # chronic lower back pain /chronic anxiety  -will hold long acting morphine for now and give morphine prn due to possible overdosing of home  opiate  -Xanax prn      # DVt prophylaxis-  lovenox Sc      Criticare    I was called by nurse with critical value ABG PH 7.25,CO2 101  patient was seen and evaluated immediately  no chnage in mentation as compared to above.patient awake ,alert oriented x3,but intermittently forgetful  lungs occasional rhonchi  patient not somnolent or lethargic but still has asterix    assessemnt added to above  #Acute on chronic hypercapneic respiratory failure /secondary to hypoventilation syndrome/GERALD/   with underlying COPD vs asthma  without acute exacerbation at this time   Doubt acute CHF /unlikely pneumonia    #will start BIPAP I/E 12/5 FiO2 40% keep puls ox between 94 and 97  #neb tx ,inhaled steroids   # repeat ABG after 2 hours of BIPAP  # pulmonary consult      #Acute metabolic encephalopathy multifactorial  secondary to acute on chronic hypercapneic respiratory failure opiate/xanax use /dehydration/hyperammonemia  same managemnt as above

## 2017-08-29 NOTE — ED PROVIDER NOTE - DIAGNOSIS COUNSELING, MDM
conducted a detailed discussion... I had a detailed discussion with the patient and sons regarding the historical points, exam findings, and any diagnostic results supporting the admit diagnosis.

## 2017-08-29 NOTE — ED PROVIDER NOTE - MEDICAL DECISION MAKING DETAILS
60 yo WM, h/o CHF, chr. back pain on po pain meds, recent hospitalization for PNA, p/w recent increased general weak/fatigue, confusion.  No F/C, SOB, cp, abd. pain.  Sons report pt's current condition not his baseline, pt poorly able to perform ADLs.  Plan: AMS w/u incl. labs (with BCs, urine, lactate, ammonia, Trop I, BNP), CXR, head CT.  Monitor, observe, reassess.

## 2017-08-29 NOTE — ED ADULT NURSE REASSESSMENT NOTE - NS ED NURSE REASSESS COMMENT FT1
Report taken at the change of shift at bedside. pt awake alert and oriented x4 resting comfortably in bed with no acute distress noted. Per son at bedside, pt seems more altered than baseline. pt answers questions appropriately and follows command. Denies cp,sob,ha,dz,n/v/d/fever/chills. c/o join pain. Pt made aware of awaiting for Morphine coming from Pharmacy. Plan of care updated. Vitals stable. Will cont to monitor for safety and comfort. Awaiting for hospitalist.

## 2017-08-29 NOTE — H&P ADULT - NSHPPHYSICALEXAM_GEN_ALL_CORE
PHYSICAL EXAM:    Daily     Daily     ICU Vital Signs Last 24 Hrs  T(C): 36.7 (30 Aug 2017 01:02), Max: 37.3 (29 Aug 2017 16:58)  T(F): 98 (30 Aug 2017 01:02), Max: 99.2 (29 Aug 2017 16:58)  HR: 68 (30 Aug 2017 01:02) (60 - 77)  BP: 130/60 (30 Aug 2017 01:02) (130/60 - 145/62)  BP(mean): --  ABP: --  ABP(mean): --  RR: 18 (30 Aug 2017 01:02) (15 - 18)  SpO2: 98% (30 Aug 2017 01:02) (96% - 98%)      Constitutional: awake,alert but intermittently confused and intermittently strays away from topic as if forgetting,oriented to place,person,august 2017,NAD  HEENT: Atraumatic, JACKIE, Normal, No congestion  Respiratory: Breath Sounds normal, no rhonchi/wheeze  Cardiovascular: N S1S2; MATEUS present  Gastrointestinal: Abdomen soft,obese,distended,no guarding  non tender, Bowel Ssounds present  Extremities: 2+ edema B/L LE, peripheral pulses present  Neurological: AAO x 3, positive asterix B/l hands ,B/l lower extremity 4/5,B/l upper extremity 4/5,no gross facial asymmetry,no hyperreflexia,no hyperonicity,no clonus  Skin: Non cellulitic,     Back: No CVA tenderness   Musculoskeletal: non tender  Breasts: Deferred  Genitourinary: deferred  Rectal: Deferred

## 2017-08-30 DIAGNOSIS — F41.9 ANXIETY DISORDER, UNSPECIFIED: ICD-10-CM

## 2017-08-30 DIAGNOSIS — J96.02 ACUTE RESPIRATORY FAILURE WITH HYPERCAPNIA: ICD-10-CM

## 2017-08-30 DIAGNOSIS — I10 ESSENTIAL (PRIMARY) HYPERTENSION: ICD-10-CM

## 2017-08-30 DIAGNOSIS — M47.9 SPONDYLOSIS, UNSPECIFIED: ICD-10-CM

## 2017-08-30 DIAGNOSIS — M54.5 LOW BACK PAIN: ICD-10-CM

## 2017-08-30 DIAGNOSIS — I27.2 OTHER SECONDARY PULMONARY HYPERTENSION: ICD-10-CM

## 2017-08-30 DIAGNOSIS — T40.2X5A ADVERSE EFFECT OF OTHER OPIOIDS, INITIAL ENCOUNTER: ICD-10-CM

## 2017-08-30 DIAGNOSIS — Z96.89 PRESENCE OF OTHER SPECIFIED FUNCTIONAL IMPLANTS: ICD-10-CM

## 2017-08-30 DIAGNOSIS — J96.12 CHRONIC RESPIRATORY FAILURE WITH HYPERCAPNIA: ICD-10-CM

## 2017-08-30 DIAGNOSIS — K72.90 HEPATIC FAILURE, UNSPECIFIED WITHOUT COMA: ICD-10-CM

## 2017-08-30 DIAGNOSIS — G47.33 OBSTRUCTIVE SLEEP APNEA (ADULT) (PEDIATRIC): ICD-10-CM

## 2017-08-30 DIAGNOSIS — M15.9 POLYOSTEOARTHRITIS, UNSPECIFIED: ICD-10-CM

## 2017-08-30 DIAGNOSIS — I25.10 ATHEROSCLEROTIC HEART DISEASE OF NATIVE CORONARY ARTERY WITHOUT ANGINA PECTORIS: ICD-10-CM

## 2017-08-30 DIAGNOSIS — F11.23 OPIOID DEPENDENCE WITH WITHDRAWAL: ICD-10-CM

## 2017-08-30 DIAGNOSIS — J44.9 CHRONIC OBSTRUCTIVE PULMONARY DISEASE, UNSPECIFIED: ICD-10-CM

## 2017-08-30 DIAGNOSIS — F40.240 CLAUSTROPHOBIA: ICD-10-CM

## 2017-08-30 DIAGNOSIS — Z79.82 LONG TERM (CURRENT) USE OF ASPIRIN: ICD-10-CM

## 2017-08-30 DIAGNOSIS — D64.9 ANEMIA, UNSPECIFIED: ICD-10-CM
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
## 2017-08-30 DIAGNOSIS — Z79.891 LONG TERM (CURRENT) USE OF OPIATE ANALGESIC: ICD-10-CM

## 2017-08-30 DIAGNOSIS — Z96.643 PRESENCE OF ARTIFICIAL HIP JOINT, BILATERAL: ICD-10-CM

## 2017-08-30 DIAGNOSIS — Z99.81 DEPENDENCE ON SUPPLEMENTAL OXYGEN: ICD-10-CM

## 2017-08-30 DIAGNOSIS — I26.99 OTHER PULMONARY EMBOLISM WITHOUT ACUTE COR PULMONALE: ICD-10-CM

## 2017-08-30 DIAGNOSIS — G89.29 OTHER CHRONIC PAIN: ICD-10-CM

## 2017-08-30 DIAGNOSIS — E66.2 MORBID (SEVERE) OBESITY WITH ALVEOLAR HYPOVENTILATION: ICD-10-CM

## 2017-08-30 DIAGNOSIS — R07.9 CHEST PAIN, UNSPECIFIED: ICD-10-CM

## 2017-08-30 DIAGNOSIS — M79.2 NEURALGIA AND NEURITIS, UNSPECIFIED: ICD-10-CM

## 2017-08-30 DIAGNOSIS — J96.11 CHRONIC RESPIRATORY FAILURE WITH HYPOXIA: ICD-10-CM

## 2017-08-30 DIAGNOSIS — J45.901 UNSPECIFIED ASTHMA WITH (ACUTE) EXACERBATION: ICD-10-CM

## 2017-08-30 LAB
ALBUMIN SERPL ELPH-MCNC: 2.8 G/DL — LOW (ref 3.3–5)
ALP SERPL-CCNC: 83 U/L — SIGNIFICANT CHANGE UP (ref 40–120)
ALT FLD-CCNC: 45 U/L — SIGNIFICANT CHANGE UP (ref 12–78)
AMMONIA BLD-MCNC: 64 UMOL/L — HIGH (ref 11–32)
AMMONIA BLD-MCNC: 85 UMOL/L — HIGH (ref 11–32)
ANION GAP SERPL CALC-SCNC: <2 MMOL/L — LOW (ref 5–17)
AST SERPL-CCNC: 16 U/L — SIGNIFICANT CHANGE UP (ref 15–37)
BASE EXCESS BLDA CALC-SCNC: 14 MMOL/L — HIGH (ref -2–2)
BASE EXCESS BLDA CALC-SCNC: 15 MMOL/L — HIGH (ref -2–2)
BASOPHILS # BLD AUTO: 0.1 K/UL — SIGNIFICANT CHANGE UP (ref 0–0.2)
BASOPHILS NFR BLD AUTO: 0.8 % — SIGNIFICANT CHANGE UP (ref 0–2)
BILIRUB SERPL-MCNC: 0.3 MG/DL — SIGNIFICANT CHANGE UP (ref 0.2–1.2)
BLOOD GAS COMMENTS ARTERIAL: SIGNIFICANT CHANGE UP
BLOOD GAS COMMENTS ARTERIAL: SIGNIFICANT CHANGE UP
BUN SERPL-MCNC: 16 MG/DL — SIGNIFICANT CHANGE UP (ref 7–23)
CALCIUM SERPL-MCNC: 8.7 MG/DL — SIGNIFICANT CHANGE UP (ref 8.5–10.1)
CHLORIDE SERPL-SCNC: 92 MMOL/L — LOW (ref 96–108)
CHOLEST SERPL-MCNC: 140 MG/DL — SIGNIFICANT CHANGE UP (ref 10–199)
CK SERPL-CCNC: 41 U/L — SIGNIFICANT CHANGE UP (ref 26–308)
CO2 SERPL-SCNC: >45 MMOL/L — CRITICAL HIGH (ref 22–31)
CREAT SERPL-MCNC: 0.49 MG/DL — LOW (ref 0.5–1.3)
CULTURE RESULTS: NO GROWTH — SIGNIFICANT CHANGE UP
EOSINOPHIL # BLD AUTO: 0.1 K/UL — SIGNIFICANT CHANGE UP (ref 0–0.5)
EOSINOPHIL NFR BLD AUTO: 1.4 % — SIGNIFICANT CHANGE UP (ref 0–6)
GAS PNL BLDA: SIGNIFICANT CHANGE UP
GLUCOSE SERPL-MCNC: 88 MG/DL — SIGNIFICANT CHANGE UP (ref 70–99)
HAV IGM SER-ACNC: SIGNIFICANT CHANGE UP
HBV CORE IGM SER-ACNC: SIGNIFICANT CHANGE UP
HBV SURFACE AG SER-ACNC: SIGNIFICANT CHANGE UP
HCO3 BLDA-SCNC: 43 MMOL/L — HIGH (ref 21–29)
HCO3 BLDA-SCNC: 45 MMOL/L — HIGH (ref 21–29)
HCT VFR BLD CALC: 29.4 % — LOW (ref 39–50)
HCV AB S/CO SERPL IA: 0.08 S/CO — SIGNIFICANT CHANGE UP
HCV AB SERPL-IMP: SIGNIFICANT CHANGE UP
HDLC SERPL-MCNC: 55 MG/DL — SIGNIFICANT CHANGE UP (ref 40–125)
HGB BLD-MCNC: 8.9 G/DL — LOW (ref 13–17)
HOROWITZ INDEX BLDA+IHG-RTO: 40 — SIGNIFICANT CHANGE UP
LIPID PNL WITH DIRECT LDL SERPL: 73 MG/DL — SIGNIFICANT CHANGE UP
LYMPHOCYTES # BLD AUTO: 0.7 K/UL — LOW (ref 1–3.3)
LYMPHOCYTES # BLD AUTO: 10.6 % — LOW (ref 13–44)
MAGNESIUM SERPL-MCNC: 2.1 MG/DL — SIGNIFICANT CHANGE UP (ref 1.6–2.6)
MCHC RBC-ENTMCNC: 25.4 PG — LOW (ref 27–34)
MCHC RBC-ENTMCNC: 30.1 GM/DL — LOW (ref 32–36)
MCV RBC AUTO: 84.4 FL — SIGNIFICANT CHANGE UP (ref 80–100)
MONOCYTES # BLD AUTO: 0.4 K/UL — SIGNIFICANT CHANGE UP (ref 0–0.9)
MONOCYTES NFR BLD AUTO: 5.2 % — SIGNIFICANT CHANGE UP (ref 2–14)
NEUTROPHILS # BLD AUTO: 5.7 K/UL — SIGNIFICANT CHANGE UP (ref 1.8–7.4)
NEUTROPHILS NFR BLD AUTO: 82 % — HIGH (ref 43–77)
PCO2 BLDA: 101 MMHG — CRITICAL HIGH (ref 32–46)
PCO2 BLDA: 106 MMHG — CRITICAL HIGH (ref 32–46)
PH BLDA: 7.25 — LOW (ref 7.35–7.45)
PH BLDA: 7.25 — LOW (ref 7.35–7.45)
PHOSPHATE SERPL-MCNC: 2.3 MG/DL — LOW (ref 2.5–4.5)
PLATELET # BLD AUTO: 143 K/UL — LOW (ref 150–400)
PO2 BLDA: 185 — SIGNIFICANT CHANGE UP
PO2 BLDA: 91 — SIGNIFICANT CHANGE UP
POTASSIUM SERPL-MCNC: 3.3 MMOL/L — LOW (ref 3.5–5.3)
POTASSIUM SERPL-SCNC: 3.3 MMOL/L — LOW (ref 3.5–5.3)
PROT SERPL-MCNC: 6.3 GM/DL — SIGNIFICANT CHANGE UP (ref 6–8.3)
RBC # BLD: 3.48 M/UL — LOW (ref 4.2–5.8)
RBC # FLD: 14.9 % — HIGH (ref 10.3–14.5)
SAO2 % BLDA: 96 — SIGNIFICANT CHANGE UP
SAO2 % BLDA: 99 — SIGNIFICANT CHANGE UP
SODIUM SERPL-SCNC: 139 MMOL/L — SIGNIFICANT CHANGE UP (ref 135–145)
SPECIMEN SOURCE: SIGNIFICANT CHANGE UP
TOTAL CHOLESTEROL/HDL RATIO MEASUREMENT: 2.5 RATIO — LOW (ref 3.4–9.6)
TRIGL SERPL-MCNC: 59 MG/DL — SIGNIFICANT CHANGE UP (ref 10–149)
TROPONIN I SERPL-MCNC: <0.015 NG/ML — SIGNIFICANT CHANGE UP (ref 0.01–0.04)
TSH SERPL-MCNC: 1.25 UIU/ML — SIGNIFICANT CHANGE UP (ref 0.36–3.74)
WBC # BLD: 6.9 K/UL — SIGNIFICANT CHANGE UP (ref 3.8–10.5)
WBC # FLD AUTO: 6.9 K/UL — SIGNIFICANT CHANGE UP (ref 3.8–10.5)

## 2017-08-30 PROCEDURE — 76700 US EXAM ABDOM COMPLETE: CPT | Mod: 26

## 2017-08-30 PROCEDURE — 93010 ELECTROCARDIOGRAM REPORT: CPT

## 2017-08-30 PROCEDURE — 99222 1ST HOSP IP/OBS MODERATE 55: CPT

## 2017-08-30 RX ORDER — POTASSIUM CHLORIDE 20 MEQ
10 PACKET (EA) ORAL
Qty: 0 | Refills: 0 | Status: COMPLETED | OUTPATIENT
Start: 2017-08-30 | End: 2017-08-30

## 2017-08-30 RX ORDER — SODIUM CHLORIDE 9 MG/ML
1000 INJECTION INTRAMUSCULAR; INTRAVENOUS; SUBCUTANEOUS
Qty: 0 | Refills: 0 | Status: DISCONTINUED | OUTPATIENT
Start: 2017-08-30 | End: 2017-09-01

## 2017-08-30 RX ORDER — FOLIC ACID 0.8 MG
1 TABLET ORAL DAILY
Qty: 0 | Refills: 0 | Status: DISCONTINUED | OUTPATIENT
Start: 2017-08-30 | End: 2017-09-07

## 2017-08-30 RX ORDER — ENOXAPARIN SODIUM 100 MG/ML
40 INJECTION SUBCUTANEOUS EVERY 24 HOURS
Qty: 0 | Refills: 0 | Status: DISCONTINUED | OUTPATIENT
Start: 2017-08-30 | End: 2017-09-07

## 2017-08-30 RX ORDER — DOCUSATE SODIUM 100 MG
100 CAPSULE ORAL THREE TIMES A DAY
Qty: 0 | Refills: 0 | Status: DISCONTINUED | OUTPATIENT
Start: 2017-08-30 | End: 2017-09-02

## 2017-08-30 RX ORDER — FUROSEMIDE 40 MG
40 TABLET ORAL DAILY
Qty: 0 | Refills: 0 | Status: DISCONTINUED | OUTPATIENT
Start: 2017-08-30 | End: 2017-08-30

## 2017-08-30 RX ORDER — ASPIRIN/CALCIUM CARB/MAGNESIUM 324 MG
81 TABLET ORAL DAILY
Qty: 0 | Refills: 0 | Status: DISCONTINUED | OUTPATIENT
Start: 2017-08-30 | End: 2017-09-07

## 2017-08-30 RX ORDER — LOSARTAN POTASSIUM 100 MG/1
25 TABLET, FILM COATED ORAL DAILY
Qty: 0 | Refills: 0 | Status: DISCONTINUED | OUTPATIENT
Start: 2017-08-30 | End: 2017-09-07

## 2017-08-30 RX ORDER — BUDESONIDE, MICRONIZED 100 %
0.5 POWDER (GRAM) MISCELLANEOUS EVERY 12 HOURS
Qty: 0 | Refills: 0 | Status: DISCONTINUED | OUTPATIENT
Start: 2017-08-30 | End: 2017-09-07

## 2017-08-30 RX ORDER — MORPHINE SULFATE 50 MG/1
2 CAPSULE, EXTENDED RELEASE ORAL EVERY 6 HOURS
Qty: 0 | Refills: 0 | Status: DISCONTINUED | OUTPATIENT
Start: 2017-08-30 | End: 2017-09-06

## 2017-08-30 RX ORDER — SODIUM,POTASSIUM PHOSPHATES 278-250MG
1 POWDER IN PACKET (EA) ORAL
Qty: 0 | Refills: 0 | Status: COMPLETED | OUTPATIENT
Start: 2017-08-30 | End: 2017-08-31

## 2017-08-30 RX ORDER — ALPRAZOLAM 0.25 MG
0.25 TABLET ORAL
Qty: 0 | Refills: 0 | Status: DISCONTINUED | OUTPATIENT
Start: 2017-08-30 | End: 2017-09-05

## 2017-08-30 RX ORDER — IPRATROPIUM/ALBUTEROL SULFATE 18-103MCG
3 AEROSOL WITH ADAPTER (GRAM) INHALATION EVERY 6 HOURS
Qty: 0 | Refills: 0 | Status: DISCONTINUED | OUTPATIENT
Start: 2017-08-30 | End: 2017-09-07

## 2017-08-30 RX ORDER — MONTELUKAST 4 MG/1
10 TABLET, CHEWABLE ORAL AT BEDTIME
Qty: 0 | Refills: 0 | Status: DISCONTINUED | OUTPATIENT
Start: 2017-08-30 | End: 2017-09-07

## 2017-08-30 RX ORDER — POTASSIUM CHLORIDE 20 MEQ
40 PACKET (EA) ORAL ONCE
Qty: 0 | Refills: 0 | Status: DISCONTINUED | OUTPATIENT
Start: 2017-08-30 | End: 2017-08-30

## 2017-08-30 RX ORDER — IPRATROPIUM/ALBUTEROL SULFATE 18-103MCG
3 AEROSOL WITH ADAPTER (GRAM) INHALATION EVERY 4 HOURS
Qty: 0 | Refills: 0 | Status: DISCONTINUED | OUTPATIENT
Start: 2017-08-30 | End: 2017-09-07

## 2017-08-30 RX ORDER — HYDRALAZINE HCL 50 MG
10 TABLET ORAL ONCE
Qty: 0 | Refills: 0 | Status: COMPLETED | OUTPATIENT
Start: 2017-08-30 | End: 2017-08-30

## 2017-08-30 RX ORDER — LACTULOSE 10 G/15ML
15 SOLUTION ORAL
Qty: 0 | Refills: 0 | Status: DISCONTINUED | OUTPATIENT
Start: 2017-08-30 | End: 2017-09-02

## 2017-08-30 RX ADMIN — Medication 0.5 MILLIGRAM(S): at 08:43

## 2017-08-30 RX ADMIN — Medication 10 MILLIGRAM(S): at 22:39

## 2017-08-30 RX ADMIN — Medication 0.25 MILLIGRAM(S): at 18:06

## 2017-08-30 RX ADMIN — ENOXAPARIN SODIUM 40 MILLIGRAM(S): 100 INJECTION SUBCUTANEOUS at 06:02

## 2017-08-30 RX ADMIN — Medication 100 MILLIEQUIVALENT(S): at 10:29

## 2017-08-30 RX ADMIN — Medication 81 MILLIGRAM(S): at 18:15

## 2017-08-30 RX ADMIN — Medication 0.5 MILLIGRAM(S): at 20:00

## 2017-08-30 RX ADMIN — Medication 100 MILLIEQUIVALENT(S): at 09:08

## 2017-08-30 RX ADMIN — Medication 100 MILLIGRAM(S): at 06:04

## 2017-08-30 RX ADMIN — Medication 3 MILLILITER(S): at 08:44

## 2017-08-30 RX ADMIN — Medication 1 MILLIGRAM(S): at 22:40

## 2017-08-30 RX ADMIN — Medication 1 TABLET(S): at 19:38

## 2017-08-30 RX ADMIN — Medication 60 MILLIGRAM(S): at 06:02

## 2017-08-30 RX ADMIN — Medication 100 MILLIEQUIVALENT(S): at 12:05

## 2017-08-30 RX ADMIN — Medication 1 MILLIGRAM(S): at 18:06

## 2017-08-30 RX ADMIN — LACTULOSE 15 GRAM(S): 10 SOLUTION ORAL at 18:05

## 2017-08-30 RX ADMIN — Medication 3 MILLILITER(S): at 13:29

## 2017-08-30 RX ADMIN — Medication 0.25 MILLIGRAM(S): at 02:21

## 2017-08-30 RX ADMIN — SODIUM CHLORIDE 75 MILLILITER(S): 9 INJECTION INTRAMUSCULAR; INTRAVENOUS; SUBCUTANEOUS at 06:14

## 2017-08-30 RX ADMIN — Medication 3 MILLILITER(S): at 20:00

## 2017-08-30 NOTE — CONSULT NOTE ADULT - SUBJECTIVE AND OBJECTIVE BOX
Patient is a 61y old  Male who presents with a chief complaint of weakness, multiple falls, SOB (30 Aug 2017 03:49)      HPI:  61M.  hx chronic LBP due to OA-has been on long acting  opioid  > 10 years. asthma, HTN, pulmonary HTN on home O2 3 L, GERALD, anxiety, spinal stenosis, herniated disks x2, arthritis, s/p hip replacement and spinal surgery presents to the ED c/o weakness. His son states he has fallen 4 times in past 24 hours because his left leg felt weak.  No syncope, LOC, did not hit head, no pain from fall.  Pt denies fever, HA, CP, abd pain.  He was previously admitted to the hospital for 5 days - 17 due to SOB and panic attack found to be in opiate withdrawal and had cardiac cath for persistent SOB which showed pulmonary HTN,EF65%, discharged 2 days ago . He states he feels very shaky and his left leg feels weak.  He takes morphine for arthritic pain.  Questionable whether extra doses were taken for pain.  As per son: pt is not acting at baseline.  Pt appears less alert, more confused, poorly able to describe his condition.  Pt poor historian, history obtained via pt and his son.   In ED utox positive for opiate and benzo which are his long term meds, serum ammonia 53  CXR- poor inspiratory film   EKG sinus rhythm with sinus arrhythmia nonspecific ST/T waves, , troponin x2 negative  Head CT- no acute pathology  Was given lactulose and morphine in ED    Patient denies SOB,cough,fever,chills,CP,abdominal pain ,headache ,nausea,vomiting      PMHx:  hypoxia respiratory failure-O2 dependence;  GERALD;  asthma;  HTN;  "chest congestion and leg swelling" w/o cardiac w/u (denies stress test, angiogram, hx of MI, stents or CABG);  chronic LBP due to severe OA-malfunctioning spinal cord stimulator.    PSHx:  AP;  b/l hip replacement;  lamenectomy.    Rx:  reveiwed.    SocHx:  lives in the community.   (lost his wife ~1 year ago).  no tobacco.  no current EtOH.    FHx:  no 1st degree relative w/ CAD. (29 Aug 2017 23:37)      PAST MEDICAL & SURGICAL HISTORY:  Neuritis  GERALD (obstructive sleep apnea)  Osteoarthritis  Chronic back pain greater than 3 months duration  Asthma  HTN (hypertension)  S/P hip replacement: bilateral  S/P spinal surgery  S/P appendectomy      PREVIOUS CARDIAC WORKUP:      Echo:  Stress Test:  Cardiac Cath:    ALLERGIES:    No Known Allergies       MEDICATIONS  (STANDING):  aspirin enteric coated 81 milliGRAM(s) Oral daily  losartan 25 milliGRAM(s) Oral daily  docusate sodium 100 milliGRAM(s) Oral three times a day  montelukast 10 milliGRAM(s) Oral at bedtime  folic acid 1 milliGRAM(s) Oral daily  enoxaparin Injectable 40 milliGRAM(s) SubCutaneous every 24 hours  ALBUTerol/ipratropium for Nebulization 3 milliLiter(s) Nebulizer every 6 hours  buDESOnide   0.5 milliGRAM(s) Respule 0.5 milliGRAM(s) Inhalation every 12 hours  sodium chloride 0.9%. 1000 milliLiter(s) (75 mL/Hr) IV Continuous <Continuous>  lactulose Syrup 15 Gram(s) Oral two times a day    MEDICATIONS  (PRN):  ALPRAZolam 0.25 milliGRAM(s) Oral two times a day PRN anxiety  ALBUTerol/ipratropium for Nebulization 3 milliLiter(s) Nebulizer every 4 hours PRN Shortness of Breath and/or Wheezing  morphine  - Injectable 2 milliGRAM(s) IV Push every 6 hours PRN Severe Pain (7 - 10)      FAMILY HISTORY:  No pertinent family history in first degree relatives        SOCIAL HISTORY:  .scl     ROS:     .ros2    Vital Signs Last 24 Hrs  T(C): 36.7 (30 Aug 2017 01:02), Max: 37.3 (29 Aug 2017 16:58)  T(F): 98 (30 Aug 2017 01:02), Max: 99.2 (29 Aug 2017 16:58)  HR: 55 (30 Aug 2017 10:00) (40 - 77)  BP: 140/64 (30 Aug 2017 10:00) (130/60 - 172/62)  BP(mean): 83 (30 Aug 2017 10:00) (78 - 91)  RR: 19 (30 Aug 2017 10:00) (12 - 19)  SpO2: 99% (30 Aug 2017 10:00) (96% - 100%)    I&O's Summary    29 Aug 2017 07:01  -  30 Aug 2017 07:00  --------------------------------------------------------  IN: 148 mL / OUT: 0 mL / NET: 148 mL        PHYSICAL EXAM:    General Appearance:    Comfortable, AAO X 3, in no distress.   HEENT:                       Atraumatic, PERRLA, EOMI, conjunctiva clear.   Neck:                          Supple, no adenopathy, no thyromegaly, no JVD, no carotid bruit  Back:                          Symmetric, no CV angle fullness or tenderness, no soft tissue tenderness.  Chest:                         Clear to auscultation, no wheezes, rales or rhonchi, symmetric air entry, no tachypnea, retractions or cyanosis  Heart:                          S1, S2 normal, no gallop, no murmur.  Abdomen:                    Soft, non-tender, bowel sounds active. No palpable masses.   Extremities:                 no cyanosis, no edema, no joint swelling. Peripheral pulses normal  Skin:                           Skin color, texture normal. No rashes   Neurologic:                  Grossly cranial nerves intact, sensory and motor normal.      TELEMETRY:    ECG:    LABS:                          8.9    6.9   )-----------( 143      ( 30 Aug 2017 05:29 )             29.4     0830    139  |  92<L>  |  16  ----------------------------<  88  3.3<L>   |  >45<HH>  |  0.49<L>    Ca    8.7      30 Aug 2017 05:29  Phos  2.3       Mg     2.1         TPro  6.3  /  Alb  2.8<L>  /  TBili  0.3  /  DBili  x   /  AST  16  /  ALT  45  /  AlkPhos  83  0830    Lipid Panel  Chl 140  HDL 55  LDL 73  Trg 59       @ 23:56  Trop-I  <0.015  CK      41  CK-MB   --     @ 20:58  Trop-I  <0.015  CK      --  CK-MB   --     @ 17:30  Trop-I  <0.015  CK      51  CK-MB   --    Pro BNP  138  @ 17:30  D Dimer  --  @ 17:30    PT/INR - ( 29 Aug 2017 17:30 )   PT: 11.4 sec;   INR: 1.05 ratio         PTT - ( 29 Aug 2017 17:30 )  PTT:31.3 sec  Urinalysis Basic - ( 29 Aug 2017 20:06 )    Color: Yellow / Appearance: Clear / S.020 / pH: x  Gluc: x / Ketone: Small  / Bili: Negative / Urobili: 1 mg/dL   Blood: x / Protein: 30 mg/dL / Nitrite: Negative   Leuk Esterase: Trace / RBC: 3-5 /HPF / WBC 6-10   Sq Epi: x / Non Sq Epi: x / Bacteria: Few      ABG - ( 30 Aug 2017 05:55 )  pH: 7.25  /  pCO2: 106   /  pO2: 185   / HCO3: 45    / Base Excess: 15    /  SaO2: 99                    RADIOLOGY & ADDITIONAL STUDIES: Chief complaint of weakness, multiple falls, SOB (30 Aug 2017 03:49)      HPI:  61M.  hx chronic LBP due to OA-has been on long acting  opioid  > 10 years. asthma, HTN, pulmonary HTN on home O2 3 L, GERALD, anxiety, spinal stenosis, herniated disks x2, arthritis, s/p hip replacement and spinal surgery presents to the ED c/o weakness. His son states he has fallen 4 times in past 24 hours because his left leg felt weak.  No syncope, LOC, did not hit head, no pain from fall.  Pt denies fever, HA, CP, abd pain.  He was previously admitted to the hospital for 5 days - 17 due to SOB and panic attack found to be in opiate withdrawal and had cardiac cath for persistent SOB which showed pulmonary HTN,EF65%, discharged 2 days ago . He states he feels very shaky and his left leg feels weak.  He takes morphine for arthritic pain.  Questionable whether extra doses were taken for pain.  As per son: pt is not acting at baseline.  Pt appears less alert, more confused, poorly able to describe his condition.  Pt poor historian, history obtained via pt and his son.   In ED utox positive for opiate and benzo which are his long term meds, serum ammonia 53  CXR- poor inspiratory film   EKG sinus rhythm with sinus arrhythmia nonspecific ST/T waves, , troponin x2 negative  Head CT- no acute pathology  Was given lactulose and morphine in ED      PAST MEDICAL & SURGICAL HISTORY:  Neuritis  GERALD (obstructive sleep apnea)  Osteoarthritis  Chronic back pain greater than 3 months duration  Asthma  HTN (hypertension)  S/P hip replacement: bilateral  S/P spinal surgery, laminectomy  S/P appendectomy      PREVIOUS CARDIAC WORKUP:      Echo:  17. Technically difficult study. Normal LV systolic function, mild LVH, mild diastolic dysfunction, mild TR, trace MR, mild mitral annular calcification. Mild pulmonary artery hypertension. RV does not seem to be enlarged on limited views available.    Cardiac Cath:  17. Normal coronaries, moderate pulmonary artery hypertension.    ALLERGIES:    No Known Allergies       MEDICATIONS  (STANDING):  aspirin enteric coated 81 milliGRAM(s) Oral daily  losartan 25 milliGRAM(s) Oral daily  docusate sodium 100 milliGRAM(s) Oral three times a day  montelukast 10 milliGRAM(s) Oral at bedtime  folic acid 1 milliGRAM(s) Oral daily  enoxaparin Injectable 40 milliGRAM(s) SubCutaneous every 24 hours  ALBUTerol/ipratropium for Nebulization 3 milliLiter(s) Nebulizer every 6 hours  buDESOnide   0.5 milliGRAM(s) Respule 0.5 milliGRAM(s) Inhalation every 12 hours  sodium chloride 0.9%. 1000 milliLiter(s) (75 mL/Hr) IV Continuous <Continuous>  lactulose Syrup 15 Gram(s) Oral two times a day    MEDICATIONS  (PRN):  ALPRAZolam 0.25 milliGRAM(s) Oral two times a day PRN anxiety  ALBUTerol/ipratropium for Nebulization 3 milliLiter(s) Nebulizer every 4 hours PRN Shortness of Breath and/or Wheezing  morphine  - Injectable 2 milliGRAM(s) IV Push every 6 hours PRN Severe Pain (7 - 10)      FAMILY HISTORY:  No pertinent family history in first degree relatives        SOCIAL HISTORY:  Nonsmoker. No ETOH abuse. No illicit drugs.     ROS:     Detailed ten system ROS was performed and was negative except for history as eluded to above.    no fever  no chills  no nausea  no vomiting  no diarrhea  no constipation  no melena  no hematochezia  no chest pain  no palpitations  no sob  no dyspnea  no cough  no wheezing  no anorexia  no headache  no dizziness  no syncope  + weakness  no myalgia  no dysuria  no polyuria  no hematuria       Vital Signs Last 24 Hrs  T(C): 36.7 (30 Aug 2017 01:02), Max: 37.3 (29 Aug 2017 16:58)  T(F): 98 (30 Aug 2017 01:02), Max: 99.2 (29 Aug 2017 16:58)  HR: 55 (30 Aug 2017 10:00) (40 - 77)  BP: 140/64 (30 Aug 2017 10:00) (130/60 - 172/62)  BP(mean): 83 (30 Aug 2017 10:00) (78 - 91)  RR: 19 (30 Aug 2017 10:00) (12 - 19)  SpO2: 99% (30 Aug 2017 10:00) (96% - 100%)    I&O's Summary    29 Aug 2017 07:01  -  30 Aug 2017 07:00  --------------------------------------------------------  IN: 148 mL / OUT: 0 mL / NET: 148 mL        PHYSICAL EXAM:    Gen: patient is in no acute distress  HEENT: NC/AT, no conjunctivitis or scleral icterus; no nasal discharge or congestion.   Neck: supple, no cervical LAD  Chest: CTA b/l, no crackles/wheezes, good air entry, no tachypnea or retractions  CV: regular rate and rhythm, no murmurs   Abd: soft, nontender, nondistended, no HSM appreciated, +BS  Extrem: 2+ peripheral pulses, WWP.   Neuro: non-focal      TELEMETRY:  Normal sinus rhythm with no tachy or kendra events     ECG:  Sinus Kendra, no ST T changes of ischemia or infarction.     LABS:                          8.9    6.9   )-----------( 143      ( 30 Aug 2017 05:29 )             29.4     08-30    139  |  92<L>  |  16  ----------------------------<  88  3.3<L>   |  >45<HH>  |  0.49<L>    Ca    8.7      30 Aug 2017 05:29  Phos  2.3       Mg     2.1         TPro  6.3  /  Alb  2.8<L>  /  TBili  0.3  /  DBili  x   /  AST  16  /  ALT  45  /  AlkPhos  83  08-30    Lipid Panel  Chl 140  HDL 55  LDL 73  Trg 59       @ 23:56  Trop-I  <0.015  CK      41  CK-MB   --     @ 20:58  Trop-I  <0.015  CK      --  CK-MB   --     @ 17:30  Trop-I  <0.015  CK      51  CK-MB   --    Pro BNP  138  @ 17:30  D Dimer  --  @ 17:30    PT/INR - ( 29 Aug 2017 17:30 )   PT: 11.4 sec;   INR: 1.05 ratio         PTT - ( 29 Aug 2017 17:30 )  PTT:31.3 sec  Urinalysis Basic - ( 29 Aug 2017 20:06 )    Color: Yellow / Appearance: Clear / S.020 / pH: x  Gluc: x / Ketone: Small  / Bili: Negative / Urobili: 1 mg/dL   Blood: x / Protein: 30 mg/dL / Nitrite: Negative   Leuk Esterase: Trace / RBC: 3-5 /HPF / WBC 6-10   Sq Epi: x / Non Sq Epi: x / Bacteria: Few      ABG - ( 30 Aug 2017 05:55 )  pH: 7.25  /  pCO2: 106   /  pO2: 185   / HCO3: 45    / Base Excess: 15    /  SaO2: 99          RADIOLOGY & ADDITIONAL STUDIES:    CXR - Clear lungs Chief complaint of weakness, multiple falls, SOB (30 Aug 2017 03:49)    HPI:  61M.  hx chronic LBP due to OA-has been on long acting  opioid  > 10 years, asthma, HTN, pulmonary HTN on home O2 3 L, GERALD, anxiety, spinal stenosis, herniated discs x 2, arthritis, s/p hip replacement and spinal surgery presents to the ED c/o weakness. His son states he has fallen 4 times in the 24 hours before admission because his left leg felt weak.  No syncope, LOC, did not hit head, no pain from fall.  Pt denies fever, HA, CP, abd pain.  He was previously admitted to the hospital for 5 days - 17 due to SOB and panic attacks found to be in opiate withdrawal. He had a  cardiac cath for persistent SOB which showed non obstructive cors, moderate pulmonary HTN, EF65%, discharged 2 days ago . He states he feels very shaky and his left leg feels weak.  He takes morphine for arthritic pain.  Questionable whether extra doses were taken for pain.  As per son: pt is not acting at baseline.  Pt appears less alert, more confused, poorly able to describe his condition.  Pt poor historian, history obtained via chart, pt and his son. Urine tox positive for opiate and benzo which are his long term meds. elevated S Ammonia levels are noted.       PAST MEDICAL & SURGICAL HISTORY:  Neuritis  GERALD (obstructive sleep apnea)  Osteoarthritis  Chronic back pain greater than 3 months duration  Asthma  HTN (hypertension)  S/P hip replacement: bilateral  S/P spinal surgery, laminectomy  S/P appendectomy      PREVIOUS CARDIAC WORKUP:      Echo:  17. Technically difficult study. Normal LV systolic function, mild LVH, mild diastolic dysfunction, mild TR, trace MR, mild mitral annular calcification. Mild pulmonary artery hypertension. RV does not seem to be enlarged on limited views available.    Cardiac Cath:  17. Normal coronaries, moderate pulmonary artery hypertension.    ALLERGIES:    No Known Allergies       MEDICATIONS  (STANDING):  aspirin enteric coated 81 milliGRAM(s) Oral daily  losartan 25 milliGRAM(s) Oral daily  docusate sodium 100 milliGRAM(s) Oral three times a day  montelukast 10 milliGRAM(s) Oral at bedtime  folic acid 1 milliGRAM(s) Oral daily  enoxaparin Injectable 40 milliGRAM(s) SubCutaneous every 24 hours  ALBUTerol/ipratropium for Nebulization 3 milliLiter(s) Nebulizer every 6 hours  buDESOnide   0.5 milliGRAM(s) Respule 0.5 milliGRAM(s) Inhalation every 12 hours  sodium chloride 0.9%. 1000 milliLiter(s) (75 mL/Hr) IV Continuous <Continuous>  lactulose Syrup 15 Gram(s) Oral two times a day    MEDICATIONS  (PRN):  ALPRAZolam 0.25 milliGRAM(s) Oral two times a day PRN anxiety  ALBUTerol/ipratropium for Nebulization 3 milliLiter(s) Nebulizer every 4 hours PRN Shortness of Breath and/or Wheezing  morphine  - Injectable 2 milliGRAM(s) IV Push every 6 hours PRN Severe Pain (7 - 10)      FAMILY HISTORY:  No pertinent family history in first degree relatives        SOCIAL HISTORY:  Nonsmoker. No ETOH abuse. No illicit drugs.     ROS:     Detailed ten system ROS was performed and was negative except for history as eluded to above.    no fever  no chills  no nausea  no vomiting  no diarrhea  no constipation  no melena  no hematochezia  no chest pain  no palpitations  no sob  no dyspnea  no cough  no wheezing  no anorexia  no headache  no dizziness  no syncope  + weakness  no myalgia  no dysuria  no polyuria  no hematuria       Vital Signs Last 24 Hrs  T(C): 36.7 (30 Aug 2017 01:02), Max: 37.3 (29 Aug 2017 16:58)  T(F): 98 (30 Aug 2017 01:02), Max: 99.2 (29 Aug 2017 16:58)  HR: 55 (30 Aug 2017 10:00) (40 - 77)  BP: 140/64 (30 Aug 2017 10:00) (130/60 - 172/62)  BP(mean): 83 (30 Aug 2017 10:00) (78 - 91)  RR: 19 (30 Aug 2017 10:00) (12 - 19)  SpO2: 99% (30 Aug 2017 10:00) (96% - 100%)    I&O's Summary    29 Aug 2017 07:01  -  30 Aug 2017 07:00  --------------------------------------------------------  IN: 148 mL / OUT: 0 mL / NET: 148 mL        PHYSICAL EXAM:    Gen: patient is in no acute distress. Lethargic  HEENT: NC/AT, no conjunctivitis or scleral icterus; no nasal discharge or congestion.   Neck: supple, no cervical LAD  Chest: CTA b/l, no crackles/wheezes, good air entry, no tachypnea or retractions  CV: regular rate and rhythm, no murmurs   Abd: soft, nontender, nondistended, no HSM appreciated, +BS  Extrem: 2+ peripheral pulses, WWP.   Neuro: non-focal      TELEMETRY:  Normal sinus rhythm with no tachy or kendra events     ECG:  Sinus Kendra, no ST T changes of ischemia or infarction.     LABS:                          8.9    6.9   )-----------( 143      ( 30 Aug 2017 05:29 )             29.4     08-30    139  |  92<L>  |  16  ----------------------------<  88  3.3<L>   |  >45<HH>  |  0.49<L>    Ca    8.7      30 Aug 2017 05:29  Phos  2.3     -30  Mg     2.1     30    TPro  6.3  /  Alb  2.8<L>  /  TBili  0.3  /  DBili  x   /  AST  16  /  ALT  45  /  AlkPhos  83  08-30    Lipid Panel  Chl 140  HDL 55  LDL 73  Trg 59      - @ 23:56  Trop-I  <0.015  CK      41  CK-MB   --     @ 20:58  Trop-I  <0.015  CK      --  CK-MB   --     @ 17:30  Trop-I  <0.015  CK      51  CK-MB   --    Pro BNP  138  @ 17:30  D Dimer  --  @ 17:30    PT/INR - ( 29 Aug 2017 17:30 )   PT: 11.4 sec;   INR: 1.05 ratio         PTT - ( 29 Aug 2017 17:30 )  PTT:31.3 sec  Urinalysis Basic - ( 29 Aug 2017 20:06 )    Color: Yellow / Appearance: Clear / S.020 / pH: x  Gluc: x / Ketone: Small  / Bili: Negative / Urobili: 1 mg/dL   Blood: x / Protein: 30 mg/dL / Nitrite: Negative   Leuk Esterase: Trace / RBC: 3-5 /HPF / WBC 6-10   Sq Epi: x / Non Sq Epi: x / Bacteria: Few      ABG - ( 30 Aug 2017 05:55 )  pH: 7.25  /  pCO2: 106   /  pO2: 185   / HCO3: 45    / Base Excess: 15    /  SaO2: 99          RADIOLOGY & ADDITIONAL STUDIES:    CXR - Clear lungs

## 2017-08-30 NOTE — CONSULT NOTE ADULT - SUBJECTIVE AND OBJECTIVE BOX
CC: 61y old  Male who presents with a chief complaint of weakness, multiple falls, SOB (30 Aug 2017 03:49)      HPI:  61 M with PMhx of chronic LBP / OA, has been on long acting  opioid  > 10 years, also has asthma, GERALD, HTN, PH on home O2, also has anxiety, spinal stenosis, hip replacement presents to the ED with c/o weakness and fall, son states he had fallen 4 times in past 24 hours because his left leg felt weak; no associated syncope / LOC or head trauma.      Patient was admitted to the hospital last week 8/22- 8/26/17 due to SOB and panic attack / opiate withdrawal, had cardiac cath, was diagnosed with pulmonary HTN - discharged 2 days ago. His son states that he had been feeling shaky, left leg felt weak, he was less alert and more confused. In ED utox positive for opiate and benzo which are his long term meds, serum ammonia 53; CT head shows no acute pathology.    At present pt seems alert but is irritable, comprehends and follows simple commands; son is sitting by his side; pt denies, headache, nausea, vomiting, visual blurring, diplopia, is not aware of motor weakness, raises all 4 extremities antigravity. C/O something stuck in right arm - pain (at the IV site , right antecubital fossa).    PMHx:  hypoxia respiratory failure-O2 dependence;  GERALD;  asthma;  HTN;  "chest congestion and leg swelling" w/o cardiac w/u (denies stress test, angiogram, hx of MI, stents or CABG);  chronic LBP due to severe spinal stenosis OA; -malfunctioning spinal cord stimulator.    PSHx:  Appendectomy;  b/l hip replacement;  laminectomy.    Rx:  reveiwed.    SocHx:  lives in the community.   (lost his wife ~1 year ago).  no tobacco.  no current EtOH.    FHx:  no 1st degree relative w/ CAD. (29 Aug 2017 23:37)      MEDICATIONS  (STANDING):  aspirin enteric coated 81 milliGRAM(s) Oral daily  losartan 25 milliGRAM(s) Oral daily  docusate sodium 100 milliGRAM(s) Oral three times a day  montelukast 10 milliGRAM(s) Oral at bedtime  folic acid 1 milliGRAM(s) Oral daily  enoxaparin Injectable 40 milliGRAM(s) SubCutaneous every 24 hours  ALBUTerol/ipratropium for Nebulization 3 milliLiter(s) Nebulizer every 6 hours  buDESOnide   0.5 milliGRAM(s) Respule 0.5 milliGRAM(s) Inhalation every 12 hours  sodium chloride 0.9%. 1000 milliLiter(s) (75 mL/Hr) IV Continuous <Continuous>  lactulose Syrup 15 Gram(s) Oral two times a day       Allergies    No Known Allergies  Intolerances    ROS: Pertinent positives in HPI, all other ROS were reviewed and are negative.      Vital Signs Last 24 Hrs  T(C): 36.7 (30 Aug 2017 01:02), Max: 37.3 (29 Aug 2017 16:58)  T(F): 98 (30 Aug 2017 01:02), Max: 99.2 (29 Aug 2017 16:58)  HR: 55 (30 Aug 2017 10:00) (40 - 77)  BP: 140/64 (30 Aug 2017 10:00) (130/60 - 172/62)  BP(mean): 83 (30 Aug 2017 10:00) (78 - 91)  RR: 19 (30 Aug 2017 10:00) (12 - 19)  SpO2: 99% (30 Aug 2017 10:00) (96% - 100%)    GE:  Constitutional: awake and alert.  HEENT: Facial mask - O2    Neck: Supple.  Respiratory: Breath sounds are clear bilaterally  Cardiovascular: S1 and S2, regula rhythm  Extremities:  no edema  Vascular: Caritid Bruit - no  Musculoskeletal: no abnormal movements  Skin: No rashes    Neurological exam:  HF: Alert attentive, follows simple commands, unable to assess orientaion as pt verbalizes minimally; is very irritable. Speech fluent, No Aphasia noted   CN: OSCAR, EOMI, tracks eyes well, face grossly symmetric, limited facial/marilee-pharynx exam  Motor: No pronator drift, raises all 4 extremities antigravity; full motor / sensory / co-ord exam is limited - due to limited participation from patient    Reflexes: BJ 2+, BR 2+, KJ 2+, AJ - plantars unable to test  Gait/Balance: Not tested    Labs:   08-30    139  |  92<L>  |  16  ----------------------------<  88  3.3<L>   |  >45<HH>  |  0.49<L>    Ca    8.7      30 Aug 2017 05:29  Phos  2.3     08-30  Mg     2.1     08-30    TPro  6.3  /  Alb  2.8<L>  /  TBili  0.3  /  DBili  x   /  AST  16  /  ALT  45  /  AlkPhos  83  08-30 08-30 Chol 140 LDL 73 HDL 55 Trig 59, 08-23 Chol 149 LDL 76 HDL 61 Trig 60  08-23 [id number] 5.1                          8.9    6.9   )-----------( 143      ( 30 Aug 2017 05:29 )             29.4       Radiology:  - CT Head: < from: CT Head No Cont (08.29.17 @ 18:07) >  No evidence of acute intracranial hemorrhage, midline shift or CT   evidence of acute territorial infarct. CC: 61y old  Male who presents with a chief complaint of weakness, multiple falls, SOB (30 Aug 2017 03:49)      HPI:  61 M with PMhx of chronic LBP / OA, has been on long acting  opioid  > 10 years, also has asthma, GERALD, HTN, PH on home O2, also has anxiety, spinal stenosis, hip replacement presents to the ED with c/o weakness and fall, son states he had fallen 4 times in past 24 hours because his left leg felt weak; no associated syncope / LOC or head trauma.      Patient was admitted to the hospital last week 8/22- 8/26/17 due to SOB and panic attack / opiate withdrawal, had cardiac cath, was diagnosed with pulmonary HTN - discharged 2 days ago. His son states that he had been feeling shaky, left leg felt weak, he was less alert and more confused. In ED utox positive for opiate and benzo which are his long term meds, serum ammonia 53; CT head shows no acute pathology.    At present pt seems alert but is irritable, comprehends and follows simple commands; son is sitting by his side; pt denies, headache, nausea, vomiting, visual blurring, diplopia, is not aware of motor weakness, raises all 4 extremities antigravity. C/O something stuck in right arm - pain (at the IV site , right antecubital fossa).    PMHx:  hypoxia respiratory failure-O2 dependence;  GERALD;  asthma;  HTN;  "chest congestion and leg swelling" w/o cardiac w/u (denies stress test, angiogram, hx of MI, stents or CABG);  chronic LBP due to severe spinal stenosis OA; -malfunctioning spinal cord stimulator.    PSHx:  Appendectomy;  b/l hip replacement;  laminectomy.    Rx:  reveiwed.    SocHx:  lives in the community.   (lost his wife ~1 year ago).  no tobacco.  no current EtOH.    FHx:  no 1st degree relative w/ CAD. (29 Aug 2017 23:37)      MEDICATIONS  (STANDING):  aspirin enteric coated 81 milliGRAM(s) Oral daily  losartan 25 milliGRAM(s) Oral daily  docusate sodium 100 milliGRAM(s) Oral three times a day  montelukast 10 milliGRAM(s) Oral at bedtime  folic acid 1 milliGRAM(s) Oral daily  enoxaparin Injectable 40 milliGRAM(s) SubCutaneous every 24 hours  ALBUTerol/ipratropium for Nebulization 3 milliLiter(s) Nebulizer every 6 hours  buDESOnide   0.5 milliGRAM(s) Respule 0.5 milliGRAM(s) Inhalation every 12 hours  sodium chloride 0.9%. 1000 milliLiter(s) (75 mL/Hr) IV Continuous <Continuous>  lactulose Syrup 15 Gram(s) Oral two times a day       Allergies    No Known Allergies  Intolerances    ROS: Pertinent positives in HPI, all other ROS were reviewed and are negative.      Vital Signs Last 24 Hrs  T(C): 36.7 (30 Aug 2017 01:02), Max: 37.3 (29 Aug 2017 16:58)  T(F): 98 (30 Aug 2017 01:02), Max: 99.2 (29 Aug 2017 16:58)  HR: 55 (30 Aug 2017 10:00) (40 - 77)  BP: 140/64 (30 Aug 2017 10:00) (130/60 - 172/62)  BP(mean): 83 (30 Aug 2017 10:00) (78 - 91)  RR: 19 (30 Aug 2017 10:00) (12 - 19)  SpO2: 99% (30 Aug 2017 10:00) (96% - 100%)    GE:  Constitutional: awake and alert.  HEENT: Facial mask - O2    Neck: Supple.  Respiratory: Breath sounds are clear bilaterally  Cardiovascular: S1 and S2, regula rhythm  Extremities:  no edema  Vascular: Caritid Bruit - no  Musculoskeletal: no abnormal movements  Skin: No rashes    Neurological exam:  HF: Alert attentive, follows simple commands, unable to assess orientaion as pt verbalizes minimally; is very irritable. Speech fluent, No Aphasia noted   CN: OSCAR, EOMI, tracks eyes well, face grossly symmetric, limited facial/marilee-pharynx exam  Motor: No pronator drift, raises all 4 extremities antigravity; full motor / sensory / co-ord exam is limited - due to limited participation from patient    Reflexes: BJ 2+, BR 2+, KJ 2+, AJ - plantars unable to test  Gait/Balance: Not tested    Labs:   08-30    Ammonia, Serum (08.30.17 @ 08:16)    Ammonia, Serum: 85 umol/L      139  |  92<L>  |  16  ----------------------------<  88  3.3<L>   |  >45<HH>  |  0.49<L>    Ca    8.7      30 Aug 2017 05:29  Phos  2.3     08-30  Mg     2.1     08-30    TPro  6.3  /  Alb  2.8<L>  /  TBili  0.3  /  DBili  x   /  AST  16  /  ALT  45  /  AlkPhos  83  08-30 08-30 Chol 140 LDL 73 HDL 55 Trig 59, 08-23 Chol 149 LDL 76 HDL 61 Trig 60  08-23 [id number] 5.1                          8.9    6.9   )-----------( 143      ( 30 Aug 2017 05:29 )             29.4       Radiology:  - CT Head: < from: CT Head No Cont (08.29.17 @ 18:07) >  No evidence of acute intracranial hemorrhage, midline shift or CT   evidence of acute territorial infarct.

## 2017-08-30 NOTE — CONSULT NOTE ADULT - ASSESSMENT
61 M with PMhx of chronic LBP-spinal stenosis / OA, long acting  opioid  > 10 years, also asthma, GERALD, HTN, PH on home O2; admitted to the hospital last week 8/22- 8/26/17 due to SOB and panic attack / opiate withdrawal, was diagnosed with pulmonary HTN; presented to ED with weakness; as per son he had been feeling shaky, left leg felt weak, he was less alert and more confused. serum ammonia 53; CT head shows no acute pathology.    On exam pt seems alert but is irritable, comprehends and follows simple commands; raises all 4 extremities antigravity.Full neuro exam is limited as pt does not participate fully.    1) Encephalopthy; hypoxic-toxic-metabolic; NH3 is elevated    2) Falls/ gait instability; etio- could be multifactorial; TIA / CVA / seizures / lumbar radiculopathy are all in differentail need to be excluded; at present neuro exam is limited (no objective left leg weakness was identified).    As d/w pts son; once pt able to ambulate with PT, determination regarding left leg weakness can be made; whether related to low back / radicular or CVA.    Pt has a spinal stimulator(malfunctioning) as per H&P; whether MRI/A can be ordered will have to be investigated .    PT for now. 61 M with PMhx of chronic LBP-spinal stenosis / OA, long acting  opioid  > 10 years, also asthma, GERALD, HTN, PH on home O2; admitted to the hospital last week 8/22- 8/26/17 due to SOB and panic attack / opiate withdrawal, was diagnosed with pulmonary HTN; presented to ED with weakness; as per son he had been feeling shaky, left leg felt weak, he was less alert and more confused. serum ammonia 53; CT head shows no acute pathology.    On exam pt seems alert but is irritable, comprehends and follows simple commands; raises all 4 extremities antigravity.Full neuro exam is limited as pt does not participate fully.    1) Encephalopthy; toxic-met-hypoxic; NH3 53, trending up 85 today    2) Falls/ gait instability; etio- could be multifactorial; TIA / CVA / seizures / lumbar radiculopathy are all in differentail need to be excluded; at present neuro exam is limited (no objective left leg weakness was identified).    As d/w pts son; once pt able to ambulate with PT, determination regarding left leg weakness can be made; whether related to low back / radicular or CVA.    Pt has a spinal stimulator(malfunctioning) as per H&P; whether MRI/A can be ordered will have to be investigated .    PT for now.

## 2017-08-30 NOTE — DIETITIAN INITIAL EVALUATION ADULT. - ENERGY NEEDS
Ht.    5' 11  "        Wt.      91  kg               BMI 42                 IBW     75  kg               Pt is at 184     %  IBW

## 2017-08-30 NOTE — DIETITIAN INITIAL EVALUATION ADULT. - OTHER INFO
Pt consult for BMI > 40,   PT has dx of hepatic encephalopathy, UTI, pmh of OA, sleep apnea, chronic lower back pain, possible CHF Pt consult for BMI > 40,   PT has dx of hepatic encephalopathy, UTI, pmh of OA, sleep apnea, chronic lower back pain, possible CHF. PT NPO, on bipap.  Pt eats regular diet at home, excellent PO intake,  gets very little physical activity

## 2017-08-30 NOTE — ED ADULT NURSE REASSESSMENT NOTE - NS ED NURSE REASSESS COMMENT FT1
pt resting comfortably in bed with no acute distress noted. vitals stable. Will cont to monitor for safety and comfort.

## 2017-08-30 NOTE — CHART NOTE - NSCHARTNOTEFT_GEN_A_CORE
Patient's ABG did not improve with BIPAP 12/5 40%, ABG Ph remains at 7.25,PCO2 106  patient 's mentation is same as on arrival,alert when woken up ,easily arousable,oriented x3   not hypoxic   I spoke with Dr rosa for urgent Intensivist evaluation /dr sánchez will see patient

## 2017-08-30 NOTE — CONSULT NOTE ADULT - ASSESSMENT
AMS sec to elevated S Ammonia AMS sec to encephalopathy from elevated S Ammonia and hypercapnia. R/o hepatic cause of elevated S NH3.  Hypercapnic resp failure  Essential HTN  Cardiac status is stable, normal coronaries recently  Moderate PAH    Suggest:    Cardiac status is stable. Continue current cardiac meds  Lactulose and correct S NH3 levels.  GI evaluation. Liver imaging.  BiPAP, pulm evaluation.  I shall f/u PRN  D/w pt's sone at bedside

## 2017-08-30 NOTE — CONSULT NOTE ADULT - ASSESSMENT
61M.  hx chronic LBP due to OA-has been on long acting  opioid  > 10 years. asthma, HTN, pulmonary HTN on home O2 3 L, GERALD, anxiety, spinal stenosis, herniated disks x2, arthritis, s/p hip replacement and spinal surgery presents to the ED c/o weakness. His son states he has fallen 4 times in past 24 hours because his left leg felt weak.  No syncope, LOC, did not hit head, no pain from fall.  Pt denies fever, HA, CP, abd pain.  He was previously admitted to the hospital for 5 days 8/22- 8/26/17 due to SOB and panic attack found to be in opiate withdrawal and had cardiac cath for persistent SOB which showed pulmonary HTN,EF65%, discharged 2 days ago . He states he feels very shaky and his left leg feels weak.  He takes morphine for arthritic pain.  Questionable whether extra doses were taken for pain.  As per son: pt is not acting at baseline.  Pt appears less alert, more confused, poorly able to describe his condition.  Pt poor historian, history obtained via pt and his son.   In ED utox positive for opiate and benzo which are his long term meds, serum ammonia 53  CXR- poor inspiratory film   EKG sinus rhythm with sinus arrhythmia nonspecific ST/T waves, , troponin x2 negative  Head CT- no acute pathology  Was given lactulose and morphine in ED  metabolic encephalopthy is multifactorial and includes CO2 narcosis discussed with team as well as narcotics  limit o2 supplementa given comples issues and CO2 retenstion  time spent taking care of critically ill pt 50 mins  ICU consult appropriate    pt's status is reveiwed with his son at bedside and incraesed morbidity and mortality associated with his condition also rev in details in person.  pt is lethargic but arousable  intermittently tolerated BIPAP  data also rev with residents and Intensivist  critical pulm statuis with acute on chronic hypercapnic resp failure discuissed as well  increased risk of intubation / ventilatory support needed with tracheostomy if he can not tolertae BIPAP    Acute on chronic hypercapnic / hypoxemic resp failure  GERALD / OHS / restrictive pulm dysfunction  chronic pain meds with narcotic induced hypoventilation  severe pulm HTN  s/p cath , pls see data attached

## 2017-08-30 NOTE — CONSULT NOTE ADULT - SUBJECTIVE AND OBJECTIVE BOX
Patient is a 61y old  Male who presents with a chief complaint of weakness, multiple falls, SOB (30 Aug 2017 03:49)      HPI:  61M.  hx chronic LBP due to OA-has been on long acting  opioid  > 10 years. asthma, HTN, pulmonary HTN on home O2 3 L, GERALD, anxiety, spinal stenosis, herniated disks x2, arthritis, s/p hip replacement and spinal surgery presents to the ED c/o weakness. His son states he has fallen 4 times in past 24 hours because his left leg felt weak.  No syncope, LOC, did not hit head, no pain from fall.  Pt denies fever, HA, CP, abd pain.  He was previously admitted to the hospital for 5 days - 17 due to SOB and panic attack found to be in opiate withdrawal and had cardiac cath for persistent SOB which showed pulmonary HTN,EF65%, discharged 2 days ago . He states he feels very shaky and his left leg feels weak.  He takes morphine for arthritic pain.  Questionable whether extra doses were taken for pain.  As per son: pt is not acting at baseline.  Pt appears less alert, more confused, poorly able to describe his condition.  Pt poor historian, history obtained via pt and his son.   In ED utox positive for opiate and benzo which are his long term meds, serum ammonia 53  CXR- poor inspiratory film   EKG sinus rhythm with sinus arrhythmia nonspecific ST/T waves, , troponin x2 negative  Head CT- no acute pathology  Was given lactulose and morphine in ED    pt's status is reveiwed with his son at bedside and incraesed morbidity and mortality associated with his condition also rev in details in person.  pt is lethargic but arousable  intermittently tolerated BIPAP  data also rev with residents and Intensivist  critical pulm statuis with acute on chronic hypercapnic resp failure discuissed as well  increased risk of intubation / ventilatory support needed with tracheostomy if he can not tolertae BIPAP    Patient denies SOB,cough,fever,chills,CP,abdominal pain ,headache ,nausea,vomiting      PMHx:  hypoxia respiratory failure-O2 dependence;  GERALD;  asthma;  HTN;  "chest congestion and leg swelling" w/o cardiac w/u (denies stress test, angiogram, hx of MI, stents or CABG);  chronic LBP due to severe OA-malfunctioning spinal cord stimulator.    PSHx:  AP;  b/l hip replacement;  lamenectomy.    Rx:  reveiwed.    SocHx:  lives in the community.   (lost his wife ~1 year ago).  no tobacco.  no current EtOH.    FHx:  no 1st degree relative w/ CAD. (29 Aug 2017 23:37)      PAST MEDICAL & SURGICAL HISTORY:  Neuritis  GERALD (obstructive sleep apnea)  Osteoarthritis  Chronic back pain greater than 3 months duration  Asthma  HTN (hypertension)  S/P hip replacement: bilateral  S/P spinal surgery  S/P appendectomy    < from: Cardiac Cath Lab - Adult (17 @ 08:35) >   Impression     Diagnostic Conclusions     Normal LV systolic function. Estimated LV ejection fraction is 65 %.   Normal coronary arteries.   Elevated left ventricular end-diastolic pressure.   Hemodynamic status is abnormal.   Severe pulmonary artery hypertension.    < end of copied text >    PREVIOUS DIAGNOSTIC TESTING:      MEDICATIONS  (STANDING):  aspirin enteric coated 81 milliGRAM(s) Oral daily  losartan 25 milliGRAM(s) Oral daily  docusate sodium 100 milliGRAM(s) Oral three times a day  montelukast 10 milliGRAM(s) Oral at bedtime  folic acid 1 milliGRAM(s) Oral daily  enoxaparin Injectable 40 milliGRAM(s) SubCutaneous every 24 hours  ALBUTerol/ipratropium for Nebulization 3 milliLiter(s) Nebulizer every 6 hours  buDESOnide   0.5 milliGRAM(s) Respule 0.5 milliGRAM(s) Inhalation every 12 hours  sodium chloride 0.9%. 1000 milliLiter(s) (75 mL/Hr) IV Continuous <Continuous>  potassium chloride  10 mEq/100 mL IVPB 10 milliEquivalent(s) IV Intermittent every 1 hour    MEDICATIONS  (PRN):  ALPRAZolam 0.25 milliGRAM(s) Oral two times a day PRN anxiety  ALBUTerol/ipratropium for Nebulization 3 milliLiter(s) Nebulizer every 4 hours PRN Shortness of Breath and/or Wheezing  morphine  - Injectable 2 milliGRAM(s) IV Push every 6 hours PRN Severe Pain (7 - 10)      FAMILY HISTORY:  No pertinent family history in first degree relatives      SOCIAL HISTORY:  ***    REVIEW OF SYSTEM:  Pertinent items are noted in HPI.  Constitutional negative for chills, fevers, sweats and weight loss  throat, and face:  negative for epistaxis, nasal congestion, sore throat and   tinnitus  Respiratory:pos for cough, dyspnea on exertion, pleuritic chest pain  and wheezing  Cardiovascular:  negative for chest pain, dyspnea and palpitations  Gastrointestinal: negative for abdominal pain, diarrhea, nausea and vomiting  Genitourinary: negative for dysuria, frequency and urinary incontinence  Hematologic/lymphatic: negative for bleeding and easy bruising  Musculoskeletal: negative for arthralgias, back pain and muscle weakness  Neurological:pos for dizziness, headaches, seizures and tremors    Vital Signs Last 24 Hrs  T(C): 36.7 (30 Aug 2017 01:02), Max: 37.3 (29 Aug 2017 16:58)  T(F): 98 (30 Aug 2017 01:02), Max: 99.2 (29 Aug 2017 16:58)  HR: 54 (30 Aug 2017 09:00) (40 - 77)  BP: 157/65 (30 Aug 2017 06:00) (130/60 - 172/62)  BP(mean): 86 (30 Aug 2017 06:00) (80 - 90)  RR: 17 (30 Aug 2017 06:00) (15 - 19)  SpO2: 98% (30 Aug 2017 09:00) (96% - 100%)    I&O's Summary    29 Aug 2017 07:01  -  30 Aug 2017 07:00  --------------------------------------------------------  IN: 148 mL / OUT: 0 mL / NET: 148 mL      PHYSICAL EXAM  General Appearance: obese, lethargic, opens eyes andf spekas after verbal stimuli, son at bedside  pt verbalizes being in the hospital and 2017 date   HEENT: PERRL, conjunctiva clear, Neck: Supple, , no adenopathy,  Lungs:decreased BS lower to mid lung fileds, mild exp wheeze, basilar crackles  Heart: Regular rate and rhythm, S1, S2 normal, no murmur, rub or gallop  Abdomen: Soft, non-tender, bowel sounds active , no hepatosplenomegaly  Extremities: chronic lower ext edema  Skin: Skin color, texture normal, no rashes   Neurologic: lethargic, moves all 4 exts    ECG:    LABS:                          8.9    6.9   )-----------( 143      ( 30 Aug 2017 05:29 )             29.4     30    139  |  92<L>  |  16  ----------------------------<  88  3.3<L>   |  >45<HH>  |  0.49<L>    Ca    8.7      30 Aug 2017 05:29  Phos  2.3       Mg     2.1         TPro  6.3  /  Alb  2.8<L>  /  TBili  0.3  /  DBili  x   /  AST  16  /  ALT  45  /  AlkPhos  83  30    CARDIAC MARKERS ( 29 Aug 2017 23:56 )  <0.015 ng/mL / x     / 41 U/L / x     / x      CARDIAC MARKERS ( 29 Aug 2017 20:58 )  <0.015 ng/mL / x     / x     / x     / x      CARDIAC MARKERS ( 29 Aug 2017 17:30 )  <0.015 ng/mL / x     / 51 U/L / x     / x            Pro BNP  138  @ 17:30  D Dimer  --  @ 17:30    PT/INR - ( 29 Aug 2017 17:30 )   PT: 11.4 sec;   INR: 1.05 ratio         PTT - ( 29 Aug 2017 17:30 )  PTT:31.3 sec  Urinalysis Basic - ( 29 Aug 2017 20:06 )    Color: Yellow / Appearance: Clear / S.020 / pH: x  Gluc: x / Ketone: Small  / Bili: Negative / Urobili: 1 mg/dL   Blood: x / Protein: 30 mg/dL / Nitrite: Negative   Leuk Esterase: Trace / RBC: 3-5 /HPF / WBC 6-10   Sq Epi: x / Non Sq Epi: x / Bacteria: Few      ABG - ( 30 Aug 2017 05:55 )  pH: 7.25  /  pCO2: 106   /  pO2: 185   / HCO3: 45    / Base Excess: 15    /  SaO2: 99        100% NRBM, now removed after visit    < from: CT Angio Chest PE Protocol w/ IV Cont (17 @ 15:13) >  IMPRESSION: For contrast opacification of the pulmonary arteries. No   large or central pulmonary embolism.    < end of copied text >      < from: CT Head No Cont (17 @ 18:07) >  IMPRESSION:     No evidence of acute intracranial hemorrhage, midline shift or CT   evidence of acute territorial infarct.    If the patient's symptoms persist, consider short interval follow-up head   CT or brain MRIif there are no MRI contraindications.    < end of copied text >      RADIOLOGY & ADDITIONAL STUDIES:    < from: Xray Chest 1 View AP/PA. (17 @ 10:54) >  PROCEDURE DATE:  2017          INTERPRETATION:  Portable chest radiograph dated 2017.    COMPARISON: 2017.    CLINICAL INFORMATION: Pre VQ scan.    FINDINGS:    The airway is midline.  There are no airspace consolidations. Some pleural thickening at the   minor fissure is again noted.  There is no pleural effusion or pneumothorax.   The cardiac silhouette is normal size.   The bones are normal.     IMPRESSION:    No acute cardiopulmonary findings.    < end of copied text >

## 2017-08-31 LAB
AMMONIA BLD-MCNC: 17 UMOL/L — SIGNIFICANT CHANGE UP (ref 11–32)
ANION GAP SERPL CALC-SCNC: 8 MMOL/L — SIGNIFICANT CHANGE UP (ref 5–17)
BASE EXCESS BLDA CALC-SCNC: 10 MMOL/L — HIGH (ref -2–2)
BLOOD GAS COMMENTS ARTERIAL: SIGNIFICANT CHANGE UP
BUN SERPL-MCNC: 15 MG/DL — SIGNIFICANT CHANGE UP (ref 7–23)
CALCIUM SERPL-MCNC: 10 MG/DL — SIGNIFICANT CHANGE UP (ref 8.5–10.1)
CHLORIDE SERPL-SCNC: 99 MMOL/L — SIGNIFICANT CHANGE UP (ref 96–108)
CO2 SERPL-SCNC: 34 MMOL/L — HIGH (ref 22–31)
CREAT SERPL-MCNC: 0.82 MG/DL — SIGNIFICANT CHANGE UP (ref 0.5–1.3)
GAS PNL BLDA: SIGNIFICANT CHANGE UP
GLUCOSE SERPL-MCNC: 124 MG/DL — HIGH (ref 70–99)
HCO3 BLDA-SCNC: 34 MMOL/L — HIGH (ref 21–29)
HCT VFR BLD CALC: 33.4 % — LOW (ref 39–50)
HGB BLD-MCNC: 10.7 G/DL — LOW (ref 13–17)
MCHC RBC-ENTMCNC: 26.9 PG — LOW (ref 27–34)
MCHC RBC-ENTMCNC: 32 GM/DL — SIGNIFICANT CHANGE UP (ref 32–36)
MCV RBC AUTO: 84.2 FL — SIGNIFICANT CHANGE UP (ref 80–100)
PCO2 BLDA: 44 MMHG — SIGNIFICANT CHANGE UP (ref 32–46)
PH BLDA: 7.5 — HIGH (ref 7.35–7.45)
PLATELET # BLD AUTO: 205 K/UL — SIGNIFICANT CHANGE UP (ref 150–400)
PO2 BLDA: 142 — SIGNIFICANT CHANGE UP
POTASSIUM SERPL-MCNC: 3.4 MMOL/L — LOW (ref 3.5–5.3)
POTASSIUM SERPL-SCNC: 3.4 MMOL/L — LOW (ref 3.5–5.3)
RBC # BLD: 3.97 M/UL — LOW (ref 4.2–5.8)
RBC # FLD: 14.4 % — SIGNIFICANT CHANGE UP (ref 10.3–14.5)
SAO2 % BLDA: 99 — SIGNIFICANT CHANGE UP
SODIUM SERPL-SCNC: 141 MMOL/L — SIGNIFICANT CHANGE UP (ref 135–145)
WBC # BLD: 12.2 K/UL — HIGH (ref 3.8–10.5)
WBC # FLD AUTO: 12.2 K/UL — HIGH (ref 3.8–10.5)

## 2017-08-31 PROCEDURE — 71010: CPT | Mod: 26

## 2017-08-31 PROCEDURE — 93010 ELECTROCARDIOGRAM REPORT: CPT

## 2017-08-31 RX ORDER — POTASSIUM CHLORIDE 20 MEQ
40 PACKET (EA) ORAL ONCE
Qty: 0 | Refills: 0 | Status: DISCONTINUED | OUTPATIENT
Start: 2017-08-31 | End: 2017-08-31

## 2017-08-31 RX ORDER — DEXMEDETOMIDINE HYDROCHLORIDE IN 0.9% SODIUM CHLORIDE 4 UG/ML
0.4 INJECTION INTRAVENOUS
Qty: 200 | Refills: 0 | Status: DISCONTINUED | OUTPATIENT
Start: 2017-08-31 | End: 2017-09-02

## 2017-08-31 RX ORDER — HALOPERIDOL DECANOATE 100 MG/ML
1 INJECTION INTRAMUSCULAR ONCE
Qty: 0 | Refills: 0 | Status: COMPLETED | OUTPATIENT
Start: 2017-08-31 | End: 2017-08-31

## 2017-08-31 RX ORDER — DEXMEDETOMIDINE HYDROCHLORIDE IN 0.9% SODIUM CHLORIDE 4 UG/ML
140 INJECTION INTRAVENOUS ONCE
Qty: 0 | Refills: 0 | Status: COMPLETED | OUTPATIENT
Start: 2017-08-31 | End: 2017-08-31

## 2017-08-31 RX ORDER — POTASSIUM CHLORIDE 20 MEQ
10 PACKET (EA) ORAL ONCE
Qty: 0 | Refills: 0 | Status: COMPLETED | OUTPATIENT
Start: 2017-08-31 | End: 2017-08-31

## 2017-08-31 RX ORDER — MORPHINE SULFATE 50 MG/1
5 CAPSULE, EXTENDED RELEASE ORAL EVERY 6 HOURS
Qty: 0 | Refills: 0 | Status: DISCONTINUED | OUTPATIENT
Start: 2017-08-31 | End: 2017-09-07

## 2017-08-31 RX ORDER — HALOPERIDOL DECANOATE 100 MG/ML
2 INJECTION INTRAMUSCULAR ONCE
Qty: 0 | Refills: 0 | Status: COMPLETED | OUTPATIENT
Start: 2017-08-31 | End: 2017-08-31

## 2017-08-31 RX ADMIN — DEXMEDETOMIDINE HYDROCHLORIDE IN 0.9% SODIUM CHLORIDE 13.5 MICROGRAM(S)/KG/HR: 4 INJECTION INTRAVENOUS at 16:57

## 2017-08-31 RX ADMIN — DEXMEDETOMIDINE HYDROCHLORIDE IN 0.9% SODIUM CHLORIDE 13.5 MICROGRAM(S)/KG/HR: 4 INJECTION INTRAVENOUS at 10:41

## 2017-08-31 RX ADMIN — MORPHINE SULFATE 5 MILLIGRAM(S): 50 CAPSULE, EXTENDED RELEASE ORAL at 10:13

## 2017-08-31 RX ADMIN — ENOXAPARIN SODIUM 40 MILLIGRAM(S): 100 INJECTION SUBCUTANEOUS at 06:33

## 2017-08-31 RX ADMIN — HALOPERIDOL DECANOATE 2 MILLIGRAM(S): 100 INJECTION INTRAMUSCULAR at 03:32

## 2017-08-31 RX ADMIN — HALOPERIDOL DECANOATE 1 MILLIGRAM(S): 100 INJECTION INTRAMUSCULAR at 10:54

## 2017-08-31 RX ADMIN — MORPHINE SULFATE 5 MILLIGRAM(S): 50 CAPSULE, EXTENDED RELEASE ORAL at 11:00

## 2017-08-31 RX ADMIN — DEXMEDETOMIDINE HYDROCHLORIDE IN 0.9% SODIUM CHLORIDE 13.5 MICROGRAM(S)/KG/HR: 4 INJECTION INTRAVENOUS at 09:22

## 2017-08-31 RX ADMIN — DEXMEDETOMIDINE HYDROCHLORIDE IN 0.9% SODIUM CHLORIDE 308.4 MICROGRAM(S): 4 INJECTION INTRAVENOUS at 09:28

## 2017-08-31 RX ADMIN — Medication 1 MILLIGRAM(S): at 02:12

## 2017-08-31 RX ADMIN — Medication 100 MILLIEQUIVALENT(S): at 16:58

## 2017-08-31 RX ADMIN — Medication 3 MILLILITER(S): at 08:57

## 2017-08-31 RX ADMIN — Medication 0.5 MILLIGRAM(S): at 19:34

## 2017-08-31 RX ADMIN — DEXMEDETOMIDINE HYDROCHLORIDE IN 0.9% SODIUM CHLORIDE 13.5 MICROGRAM(S)/KG/HR: 4 INJECTION INTRAVENOUS at 13:30

## 2017-08-31 RX ADMIN — Medication 3 MILLILITER(S): at 19:34

## 2017-08-31 RX ADMIN — DEXMEDETOMIDINE HYDROCHLORIDE IN 0.9% SODIUM CHLORIDE 13.5 MICROGRAM(S)/KG/HR: 4 INJECTION INTRAVENOUS at 11:40

## 2017-08-31 RX ADMIN — DEXMEDETOMIDINE HYDROCHLORIDE IN 0.9% SODIUM CHLORIDE 13.5 MICROGRAM(S)/KG/HR: 4 INJECTION INTRAVENOUS at 15:24

## 2017-08-31 RX ADMIN — DEXMEDETOMIDINE HYDROCHLORIDE IN 0.9% SODIUM CHLORIDE 13.5 MICROGRAM(S)/KG/HR: 4 INJECTION INTRAVENOUS at 19:05

## 2017-08-31 RX ADMIN — DEXMEDETOMIDINE HYDROCHLORIDE IN 0.9% SODIUM CHLORIDE 13.5 MICROGRAM(S)/KG/HR: 4 INJECTION INTRAVENOUS at 23:19

## 2017-09-01 DIAGNOSIS — R45.1 RESTLESSNESS AND AGITATION: ICD-10-CM

## 2017-09-01 LAB
AMMONIA BLD-MCNC: 35 UMOL/L — HIGH (ref 11–32)
ANION GAP SERPL CALC-SCNC: 5 MMOL/L — SIGNIFICANT CHANGE UP (ref 5–17)
BASE EXCESS BLDA CALC-SCNC: 9 MMOL/L — HIGH (ref -2–2)
BLOOD GAS COMMENTS ARTERIAL: SIGNIFICANT CHANGE UP
BUN SERPL-MCNC: 16 MG/DL — SIGNIFICANT CHANGE UP (ref 7–23)
CALCIUM SERPL-MCNC: 9.3 MG/DL — SIGNIFICANT CHANGE UP (ref 8.5–10.1)
CHLORIDE SERPL-SCNC: 103 MMOL/L — SIGNIFICANT CHANGE UP (ref 96–108)
CO2 SERPL-SCNC: 35 MMOL/L — HIGH (ref 22–31)
CREAT SERPL-MCNC: 0.55 MG/DL — SIGNIFICANT CHANGE UP (ref 0.5–1.3)
GAS PNL BLDA: SIGNIFICANT CHANGE UP
GLUCOSE SERPL-MCNC: 111 MG/DL — HIGH (ref 70–99)
HCO3 BLDA-SCNC: 34 MMOL/L — HIGH (ref 21–29)
HCT VFR BLD CALC: 31.7 % — LOW (ref 39–50)
HGB BLD-MCNC: 10.1 G/DL — LOW (ref 13–17)
MCHC RBC-ENTMCNC: 26.6 PG — LOW (ref 27–34)
MCHC RBC-ENTMCNC: 31.9 GM/DL — LOW (ref 32–36)
MCV RBC AUTO: 83.2 FL — SIGNIFICANT CHANGE UP (ref 80–100)
PCO2 BLDA: 50 MMHG — HIGH (ref 32–46)
PH BLDA: 7.44 — SIGNIFICANT CHANGE UP (ref 7.35–7.45)
PLATELET # BLD AUTO: 158 K/UL — SIGNIFICANT CHANGE UP (ref 150–400)
PO2 BLDA: 159 — SIGNIFICANT CHANGE UP
POTASSIUM SERPL-MCNC: 3.2 MMOL/L — LOW (ref 3.5–5.3)
POTASSIUM SERPL-SCNC: 3.2 MMOL/L — LOW (ref 3.5–5.3)
RBC # BLD: 3.81 M/UL — LOW (ref 4.2–5.8)
RBC # FLD: 14.2 % — SIGNIFICANT CHANGE UP (ref 10.3–14.5)
SAO2 % BLDA: 99 — SIGNIFICANT CHANGE UP
SODIUM SERPL-SCNC: 143 MMOL/L — SIGNIFICANT CHANGE UP (ref 135–145)
WBC # BLD: 8.2 K/UL — SIGNIFICANT CHANGE UP (ref 3.8–10.5)
WBC # FLD AUTO: 8.2 K/UL — SIGNIFICANT CHANGE UP (ref 3.8–10.5)

## 2017-09-01 RX ORDER — POTASSIUM CHLORIDE 20 MEQ
40 PACKET (EA) ORAL EVERY 4 HOURS
Qty: 0 | Refills: 0 | Status: COMPLETED | OUTPATIENT
Start: 2017-09-01 | End: 2017-09-01

## 2017-09-01 RX ADMIN — Medication 3 MILLILITER(S): at 08:04

## 2017-09-01 RX ADMIN — DEXMEDETOMIDINE HYDROCHLORIDE IN 0.9% SODIUM CHLORIDE 13.5 MICROGRAM(S)/KG/HR: 4 INJECTION INTRAVENOUS at 11:48

## 2017-09-01 RX ADMIN — Medication 3 MILLILITER(S): at 01:40

## 2017-09-01 RX ADMIN — MORPHINE SULFATE 2 MILLIGRAM(S): 50 CAPSULE, EXTENDED RELEASE ORAL at 22:30

## 2017-09-01 RX ADMIN — Medication 3 MILLILITER(S): at 14:20

## 2017-09-01 RX ADMIN — MORPHINE SULFATE 2 MILLIGRAM(S): 50 CAPSULE, EXTENDED RELEASE ORAL at 22:02

## 2017-09-01 RX ADMIN — Medication 0.25 MILLIGRAM(S): at 21:40

## 2017-09-01 RX ADMIN — Medication 40 MILLIEQUIVALENT(S): at 17:38

## 2017-09-01 RX ADMIN — MORPHINE SULFATE 5 MILLIGRAM(S): 50 CAPSULE, EXTENDED RELEASE ORAL at 11:20

## 2017-09-01 RX ADMIN — Medication 3 MILLILITER(S): at 20:49

## 2017-09-01 RX ADMIN — Medication 0.5 MILLIGRAM(S): at 20:50

## 2017-09-01 RX ADMIN — MONTELUKAST 10 MILLIGRAM(S): 4 TABLET, CHEWABLE ORAL at 21:35

## 2017-09-01 RX ADMIN — DEXMEDETOMIDINE HYDROCHLORIDE IN 0.9% SODIUM CHLORIDE 13.5 MICROGRAM(S)/KG/HR: 4 INJECTION INTRAVENOUS at 01:29

## 2017-09-01 RX ADMIN — Medication 0.5 MILLIGRAM(S): at 08:03

## 2017-09-01 RX ADMIN — ENOXAPARIN SODIUM 40 MILLIGRAM(S): 100 INJECTION SUBCUTANEOUS at 05:31

## 2017-09-01 RX ADMIN — Medication 81 MILLIGRAM(S): at 11:47

## 2017-09-01 RX ADMIN — MORPHINE SULFATE 5 MILLIGRAM(S): 50 CAPSULE, EXTENDED RELEASE ORAL at 17:37

## 2017-09-01 RX ADMIN — LOSARTAN POTASSIUM 25 MILLIGRAM(S): 100 TABLET, FILM COATED ORAL at 11:47

## 2017-09-01 RX ADMIN — Medication 40 MILLIEQUIVALENT(S): at 11:47

## 2017-09-01 RX ADMIN — Medication 1 MILLIGRAM(S): at 11:47

## 2017-09-01 RX ADMIN — MORPHINE SULFATE 5 MILLIGRAM(S): 50 CAPSULE, EXTENDED RELEASE ORAL at 11:46

## 2017-09-02 LAB
AMMONIA BLD-MCNC: 33 UMOL/L — HIGH (ref 11–32)
ANION GAP SERPL CALC-SCNC: 8 MMOL/L — SIGNIFICANT CHANGE UP (ref 5–17)
BASE EXCESS BLDA CALC-SCNC: 8 MMOL/L — HIGH (ref -2–2)
BUN SERPL-MCNC: 13 MG/DL — SIGNIFICANT CHANGE UP (ref 7–23)
CALCIUM SERPL-MCNC: 9.1 MG/DL — SIGNIFICANT CHANGE UP (ref 8.5–10.1)
CHLORIDE SERPL-SCNC: 102 MMOL/L — SIGNIFICANT CHANGE UP (ref 96–108)
CO2 SERPL-SCNC: 29 MMOL/L — SIGNIFICANT CHANGE UP (ref 22–31)
CREAT SERPL-MCNC: 0.59 MG/DL — SIGNIFICANT CHANGE UP (ref 0.5–1.3)
GAS PNL BLDA: SIGNIFICANT CHANGE UP
GLUCOSE SERPL-MCNC: 100 MG/DL — HIGH (ref 70–99)
HCO3 BLDA-SCNC: 32 MMOL/L — HIGH (ref 21–29)
HCT VFR BLD CALC: 33.6 % — LOW (ref 39–50)
HGB BLD-MCNC: 10.7 G/DL — LOW (ref 13–17)
MCHC RBC-ENTMCNC: 26.1 PG — LOW (ref 27–34)
MCHC RBC-ENTMCNC: 31.7 GM/DL — LOW (ref 32–36)
MCV RBC AUTO: 82.4 FL — SIGNIFICANT CHANGE UP (ref 80–100)
PCO2 BLDA: 48 MMHG — HIGH (ref 32–46)
PH BLDA: 7.44 — SIGNIFICANT CHANGE UP (ref 7.35–7.45)
PLATELET # BLD AUTO: 191 K/UL — SIGNIFICANT CHANGE UP (ref 150–400)
PO2 BLDA: 89 — SIGNIFICANT CHANGE UP
POTASSIUM SERPL-MCNC: 3.2 MMOL/L — LOW (ref 3.5–5.3)
POTASSIUM SERPL-SCNC: 3.2 MMOL/L — LOW (ref 3.5–5.3)
RBC # BLD: 4.08 M/UL — LOW (ref 4.2–5.8)
RBC # FLD: 15.3 % — HIGH (ref 10.3–14.5)
SAO2 % BLDA: 97 — SIGNIFICANT CHANGE UP
SODIUM SERPL-SCNC: 139 MMOL/L — SIGNIFICANT CHANGE UP (ref 135–145)
WBC # BLD: 9.8 K/UL — SIGNIFICANT CHANGE UP (ref 3.8–10.5)
WBC # FLD AUTO: 9.8 K/UL — SIGNIFICANT CHANGE UP (ref 3.8–10.5)

## 2017-09-02 RX ORDER — POTASSIUM CHLORIDE 20 MEQ
40 PACKET (EA) ORAL ONCE
Qty: 0 | Refills: 0 | Status: COMPLETED | OUTPATIENT
Start: 2017-09-02 | End: 2017-09-02

## 2017-09-02 RX ORDER — MORPHINE SULFATE 50 MG/1
3 CAPSULE, EXTENDED RELEASE ORAL ONCE
Qty: 0 | Refills: 0 | Status: DISCONTINUED | OUTPATIENT
Start: 2017-09-02 | End: 2017-09-02

## 2017-09-02 RX ORDER — ONDANSETRON 8 MG/1
4 TABLET, FILM COATED ORAL EVERY 6 HOURS
Qty: 0 | Refills: 0 | Status: DISCONTINUED | OUTPATIENT
Start: 2017-09-02 | End: 2017-09-07

## 2017-09-02 RX ORDER — LACTULOSE 10 G/15ML
15 SOLUTION ORAL DAILY
Qty: 0 | Refills: 0 | Status: DISCONTINUED | OUTPATIENT
Start: 2017-09-02 | End: 2017-09-07

## 2017-09-02 RX ADMIN — LOSARTAN POTASSIUM 25 MILLIGRAM(S): 100 TABLET, FILM COATED ORAL at 05:57

## 2017-09-02 RX ADMIN — MORPHINE SULFATE 5 MILLIGRAM(S): 50 CAPSULE, EXTENDED RELEASE ORAL at 16:27

## 2017-09-02 RX ADMIN — ONDANSETRON 4 MILLIGRAM(S): 8 TABLET, FILM COATED ORAL at 14:44

## 2017-09-02 RX ADMIN — Medication 3 MILLILITER(S): at 19:22

## 2017-09-02 RX ADMIN — LACTULOSE 15 GRAM(S): 10 SOLUTION ORAL at 05:57

## 2017-09-02 RX ADMIN — MORPHINE SULFATE 5 MILLIGRAM(S): 50 CAPSULE, EXTENDED RELEASE ORAL at 16:30

## 2017-09-02 RX ADMIN — MORPHINE SULFATE 5 MILLIGRAM(S): 50 CAPSULE, EXTENDED RELEASE ORAL at 03:20

## 2017-09-02 RX ADMIN — MORPHINE SULFATE 2 MILLIGRAM(S): 50 CAPSULE, EXTENDED RELEASE ORAL at 08:30

## 2017-09-02 RX ADMIN — MORPHINE SULFATE 3 MILLIGRAM(S): 50 CAPSULE, EXTENDED RELEASE ORAL at 10:39

## 2017-09-02 RX ADMIN — MORPHINE SULFATE 5 MILLIGRAM(S): 50 CAPSULE, EXTENDED RELEASE ORAL at 03:30

## 2017-09-02 RX ADMIN — Medication 3 MILLILITER(S): at 09:21

## 2017-09-02 RX ADMIN — MORPHINE SULFATE 2 MILLIGRAM(S): 50 CAPSULE, EXTENDED RELEASE ORAL at 08:42

## 2017-09-02 RX ADMIN — Medication 0.5 MILLIGRAM(S): at 19:22

## 2017-09-02 RX ADMIN — MONTELUKAST 10 MILLIGRAM(S): 4 TABLET, CHEWABLE ORAL at 21:47

## 2017-09-02 RX ADMIN — Medication 0.5 MILLIGRAM(S): at 09:16

## 2017-09-02 RX ADMIN — Medication 0.25 MILLIGRAM(S): at 21:47

## 2017-09-02 RX ADMIN — Medication 40 MILLIEQUIVALENT(S): at 09:42

## 2017-09-02 RX ADMIN — ENOXAPARIN SODIUM 40 MILLIGRAM(S): 100 INJECTION SUBCUTANEOUS at 05:57

## 2017-09-02 RX ADMIN — Medication 81 MILLIGRAM(S): at 12:09

## 2017-09-02 RX ADMIN — MORPHINE SULFATE 3 MILLIGRAM(S): 50 CAPSULE, EXTENDED RELEASE ORAL at 10:40

## 2017-09-02 RX ADMIN — MORPHINE SULFATE 2 MILLIGRAM(S): 50 CAPSULE, EXTENDED RELEASE ORAL at 22:37

## 2017-09-02 RX ADMIN — Medication 1 MILLIGRAM(S): at 12:08

## 2017-09-02 RX ADMIN — Medication 3 MILLILITER(S): at 02:41

## 2017-09-03 LAB
ANION GAP SERPL CALC-SCNC: 6 MMOL/L — SIGNIFICANT CHANGE UP (ref 5–17)
BASE EXCESS BLDA CALC-SCNC: 5 MMOL/L — HIGH (ref -2–2)
BUN SERPL-MCNC: 14 MG/DL — SIGNIFICANT CHANGE UP (ref 7–23)
CALCIUM SERPL-MCNC: 9.2 MG/DL — SIGNIFICANT CHANGE UP (ref 8.5–10.1)
CHLORIDE SERPL-SCNC: 103 MMOL/L — SIGNIFICANT CHANGE UP (ref 96–108)
CO2 SERPL-SCNC: 31 MMOL/L — SIGNIFICANT CHANGE UP (ref 22–31)
CREAT SERPL-MCNC: 0.65 MG/DL — SIGNIFICANT CHANGE UP (ref 0.5–1.3)
GAS PNL BLDA: SIGNIFICANT CHANGE UP
GLUCOSE SERPL-MCNC: 86 MG/DL — SIGNIFICANT CHANGE UP (ref 70–99)
HCO3 BLDA-SCNC: 31 MMOL/L — HIGH (ref 21–29)
HCT VFR BLD CALC: 34.4 % — LOW (ref 39–50)
HGB BLD-MCNC: 10.8 G/DL — LOW (ref 13–17)
MCHC RBC-ENTMCNC: 26.1 PG — LOW (ref 27–34)
MCHC RBC-ENTMCNC: 31.3 GM/DL — LOW (ref 32–36)
MCV RBC AUTO: 83.5 FL — SIGNIFICANT CHANGE UP (ref 80–100)
PCO2 BLDA: 56 MMHG — HIGH (ref 32–46)
PH BLDA: 7.36 — SIGNIFICANT CHANGE UP (ref 7.35–7.45)
PLATELET # BLD AUTO: 180 K/UL — SIGNIFICANT CHANGE UP (ref 150–400)
PO2 BLDA: 92 — SIGNIFICANT CHANGE UP
POTASSIUM SERPL-MCNC: 3.7 MMOL/L — SIGNIFICANT CHANGE UP (ref 3.5–5.3)
POTASSIUM SERPL-SCNC: 3.7 MMOL/L — SIGNIFICANT CHANGE UP (ref 3.5–5.3)
RBC # BLD: 4.12 M/UL — LOW (ref 4.2–5.8)
RBC # FLD: 15.2 % — HIGH (ref 10.3–14.5)
SAO2 % BLDA: 97 — SIGNIFICANT CHANGE UP
SODIUM SERPL-SCNC: 140 MMOL/L — SIGNIFICANT CHANGE UP (ref 135–145)
WBC # BLD: 9.1 K/UL — SIGNIFICANT CHANGE UP (ref 3.8–10.5)
WBC # FLD AUTO: 9.1 K/UL — SIGNIFICANT CHANGE UP (ref 3.8–10.5)

## 2017-09-03 RX ADMIN — MORPHINE SULFATE 5 MILLIGRAM(S): 50 CAPSULE, EXTENDED RELEASE ORAL at 11:30

## 2017-09-03 RX ADMIN — Medication 3 MILLILITER(S): at 14:58

## 2017-09-03 RX ADMIN — LOSARTAN POTASSIUM 25 MILLIGRAM(S): 100 TABLET, FILM COATED ORAL at 05:25

## 2017-09-03 RX ADMIN — MORPHINE SULFATE 2 MILLIGRAM(S): 50 CAPSULE, EXTENDED RELEASE ORAL at 00:00

## 2017-09-03 RX ADMIN — MORPHINE SULFATE 5 MILLIGRAM(S): 50 CAPSULE, EXTENDED RELEASE ORAL at 05:25

## 2017-09-03 RX ADMIN — ENOXAPARIN SODIUM 40 MILLIGRAM(S): 100 INJECTION SUBCUTANEOUS at 05:25

## 2017-09-03 RX ADMIN — Medication 0.5 MILLIGRAM(S): at 08:47

## 2017-09-03 RX ADMIN — Medication 1 MILLIGRAM(S): at 11:31

## 2017-09-03 RX ADMIN — Medication 81 MILLIGRAM(S): at 11:31

## 2017-09-03 RX ADMIN — LACTULOSE 15 GRAM(S): 10 SOLUTION ORAL at 11:31

## 2017-09-03 RX ADMIN — Medication 3 MILLILITER(S): at 08:46

## 2017-09-03 RX ADMIN — MONTELUKAST 10 MILLIGRAM(S): 4 TABLET, CHEWABLE ORAL at 21:11

## 2017-09-03 RX ADMIN — Medication 0.5 MILLIGRAM(S): at 20:39

## 2017-09-03 RX ADMIN — MORPHINE SULFATE 5 MILLIGRAM(S): 50 CAPSULE, EXTENDED RELEASE ORAL at 06:54

## 2017-09-03 RX ADMIN — MORPHINE SULFATE 5 MILLIGRAM(S): 50 CAPSULE, EXTENDED RELEASE ORAL at 17:41

## 2017-09-03 RX ADMIN — Medication 3 MILLILITER(S): at 20:36

## 2017-09-04 LAB
ANION GAP SERPL CALC-SCNC: 6 MMOL/L — SIGNIFICANT CHANGE UP (ref 5–17)
BUN SERPL-MCNC: 14 MG/DL — SIGNIFICANT CHANGE UP (ref 7–23)
CALCIUM SERPL-MCNC: 9.3 MG/DL — SIGNIFICANT CHANGE UP (ref 8.5–10.1)
CHLORIDE SERPL-SCNC: 100 MMOL/L — SIGNIFICANT CHANGE UP (ref 96–108)
CO2 SERPL-SCNC: 32 MMOL/L — HIGH (ref 22–31)
CREAT SERPL-MCNC: 0.76 MG/DL — SIGNIFICANT CHANGE UP (ref 0.5–1.3)
CULTURE RESULTS: SIGNIFICANT CHANGE UP
CULTURE RESULTS: SIGNIFICANT CHANGE UP
GLUCOSE SERPL-MCNC: 93 MG/DL — SIGNIFICANT CHANGE UP (ref 70–99)
POTASSIUM SERPL-MCNC: 3.6 MMOL/L — SIGNIFICANT CHANGE UP (ref 3.5–5.3)
POTASSIUM SERPL-SCNC: 3.6 MMOL/L — SIGNIFICANT CHANGE UP (ref 3.5–5.3)
SODIUM SERPL-SCNC: 138 MMOL/L — SIGNIFICANT CHANGE UP (ref 135–145)
SPECIMEN SOURCE: SIGNIFICANT CHANGE UP
SPECIMEN SOURCE: SIGNIFICANT CHANGE UP

## 2017-09-04 RX ADMIN — Medication 0.5 MILLIGRAM(S): at 07:44

## 2017-09-04 RX ADMIN — MONTELUKAST 10 MILLIGRAM(S): 4 TABLET, CHEWABLE ORAL at 20:23

## 2017-09-04 RX ADMIN — Medication 0.5 MILLIGRAM(S): at 19:30

## 2017-09-04 RX ADMIN — MORPHINE SULFATE 5 MILLIGRAM(S): 50 CAPSULE, EXTENDED RELEASE ORAL at 21:02

## 2017-09-04 RX ADMIN — Medication 0.25 MILLIGRAM(S): at 20:23

## 2017-09-04 RX ADMIN — LOSARTAN POTASSIUM 25 MILLIGRAM(S): 100 TABLET, FILM COATED ORAL at 05:44

## 2017-09-04 RX ADMIN — MORPHINE SULFATE 5 MILLIGRAM(S): 50 CAPSULE, EXTENDED RELEASE ORAL at 07:34

## 2017-09-04 RX ADMIN — Medication 3 MILLILITER(S): at 19:30

## 2017-09-04 RX ADMIN — LACTULOSE 15 GRAM(S): 10 SOLUTION ORAL at 12:38

## 2017-09-04 RX ADMIN — ENOXAPARIN SODIUM 40 MILLIGRAM(S): 100 INJECTION SUBCUTANEOUS at 05:44

## 2017-09-04 RX ADMIN — Medication 1 MILLIGRAM(S): at 12:38

## 2017-09-04 RX ADMIN — Medication 3 MILLILITER(S): at 07:43

## 2017-09-04 RX ADMIN — Medication 81 MILLIGRAM(S): at 12:38

## 2017-09-04 RX ADMIN — MORPHINE SULFATE 5 MILLIGRAM(S): 50 CAPSULE, EXTENDED RELEASE ORAL at 20:22

## 2017-09-04 RX ADMIN — Medication 3 MILLILITER(S): at 13:53

## 2017-09-04 NOTE — PHYSICAL THERAPY INITIAL EVALUATION ADULT - PERTINENT HX OF CURRENT PROBLEM, REHAB EVAL
62 yo M admitted with AMS. , 4 times in past 24 hours, pta.  He was previously admitted to the hospital for 5 days 8/22- 8/26/17 due to SOB and panic attack found to be in opiate withdrawal and had cardiac cath for persistent SOB which showed pulmonary HTN,EF65%, discharged 2 days ago

## 2017-09-04 NOTE — PHYSICAL THERAPY INITIAL EVALUATION ADULT - MODALITIES TREATMENT COMMENTS
Patient returned to bed by request, call bell in reach and bed alarm active.  Patient independent in functional mobility, no skilled need in this setting.  May ambulate per nursing.  Will d/c from PT. RN informed.

## 2017-09-04 NOTE — PHYSICAL THERAPY INITIAL EVALUATION ADULT - GENERAL OBSERVATIONS, REHAB EVAL
Patient received in bed on 3E, just back from a shower.  O2@3L in use at all times, saturating at 100%.  HM in use with elevation to 130s sustained during ambulation.

## 2017-09-05 ENCOUNTER — TRANSCRIPTION ENCOUNTER (OUTPATIENT)
Age: 62
End: 2017-09-05

## 2017-09-05 LAB
CULTURE RESULTS: SIGNIFICANT CHANGE UP
SPECIMEN SOURCE: SIGNIFICANT CHANGE UP

## 2017-09-05 RX ORDER — LACTULOSE 10 G/15ML
22.5 SOLUTION ORAL
Qty: 0 | Refills: 0 | COMMUNITY
Start: 2017-09-05

## 2017-09-05 RX ADMIN — LOSARTAN POTASSIUM 25 MILLIGRAM(S): 100 TABLET, FILM COATED ORAL at 05:35

## 2017-09-05 RX ADMIN — MORPHINE SULFATE 5 MILLIGRAM(S): 50 CAPSULE, EXTENDED RELEASE ORAL at 16:54

## 2017-09-05 RX ADMIN — Medication 0.5 MILLIGRAM(S): at 09:20

## 2017-09-05 RX ADMIN — Medication 3 MILLILITER(S): at 02:11

## 2017-09-05 RX ADMIN — Medication 0.5 MILLIGRAM(S): at 19:54

## 2017-09-05 RX ADMIN — MORPHINE SULFATE 5 MILLIGRAM(S): 50 CAPSULE, EXTENDED RELEASE ORAL at 17:54

## 2017-09-05 RX ADMIN — Medication 1 MILLIGRAM(S): at 11:05

## 2017-09-05 RX ADMIN — Medication 0.25 MILLIGRAM(S): at 20:51

## 2017-09-05 RX ADMIN — MORPHINE SULFATE 5 MILLIGRAM(S): 50 CAPSULE, EXTENDED RELEASE ORAL at 23:11

## 2017-09-05 RX ADMIN — Medication 3 MILLILITER(S): at 19:54

## 2017-09-05 RX ADMIN — Medication 3 MILLILITER(S): at 08:35

## 2017-09-05 RX ADMIN — ENOXAPARIN SODIUM 40 MILLIGRAM(S): 100 INJECTION SUBCUTANEOUS at 05:35

## 2017-09-05 RX ADMIN — Medication 3 MILLILITER(S): at 13:36

## 2017-09-05 RX ADMIN — MORPHINE SULFATE 5 MILLIGRAM(S): 50 CAPSULE, EXTENDED RELEASE ORAL at 04:49

## 2017-09-05 RX ADMIN — Medication 81 MILLIGRAM(S): at 11:05

## 2017-09-05 RX ADMIN — MONTELUKAST 10 MILLIGRAM(S): 4 TABLET, CHEWABLE ORAL at 20:51

## 2017-09-05 NOTE — DISCHARGE NOTE ADULT - PROVIDER TOKENS
TOKEN:'[id number]',TOKEN:'[id number]' TOKEN:'[id number]:'[id number]',TOKEN:'[id number]'

## 2017-09-05 NOTE — DISCHARGE NOTE ADULT - PLAN OF CARE
Improved breathing Continue to use BiPAP at night to prevent hypercapnic respiratory failure as per pulmonary recommendations.  Limit narcotic use to prevent respiratory depression - continue morphine. Continue losartan Continue pain management  Limit narcotic use to prevent respiratory depression - continue morphine. Continue home medications - montelukast Patient had hyperammonemia with unknown cause. Has been on lactulose inpatient, no need for outpatient since patient's status has improved. Continue BiPAP use every night to prevent hypercapnic respiratory failure. Continue pain management  Limit narcotic use to prevent respiratory depression - continue morphine.  dose has been changed , do not take 60 mg 3 times a day instead now take 10 mg extended release 2 times a day and follow up with pain medicine doctor and taper them off Continue home medications - montelukast  albuterol as needed and pulmicort 2 times daily , follow up with Dr. Reyes /Dr. Agosto

## 2017-09-05 NOTE — DISCHARGE NOTE ADULT - MEDICATION SUMMARY - MEDICATIONS TO TAKE
I will START or STAY ON the medications listed below when I get home from the hospital:    Pulmicort Flexhaler 90 mcg/inh inhalation powder  -- 1 puff(s) inhaled 2 times a day  -- Indication: For Asthma    aspirin 81 mg oral delayed release tablet  -- 1 tab(s) by mouth once a day  -- Indication: For Essential hypertension    morphine 10 mg/12 hr oral capsule, extended release  -- 1 cap(s) by mouth every 12 hours MDD:20 mg  -- Indication: For Chronic back pain greater than 3 months duration    losartan 25 mg oral tablet  -- 1 tab(s) by mouth once a day  -- Indication: For Essential hypertension    sertraline 100 mg oral tablet  -- 1 tab(s) by mouth 2 times a day  -- Indication: For Depression    Ventolin HFA 90 mcg/inh inhalation aerosol  -- 2 puff(s) inhaled every 6 hours as needed for shortness of breath  -- For inhalation only.  It is very important that you take or use this exactly as directed.  Do not skip doses or discontinue unless directed by your doctor.  Obtain medical advice before taking any non-prescription drugs as some may affect the action of this medication.  Shake well before use.    -- Indication: For Asthma    furosemide 40 mg oral tablet  -- 1 tab(s) by mouth once a day  -- Indication: For Essential hypertension    lactulose 10 g/15 mL oral syrup  -- 22.5 milliliter(s) by mouth once a day  -- Indication: For Hepatic encephalopathy    montelukast 10 mg oral tablet  -- 1 tab(s) by mouth once a day (at bedtime)  -- Indication: For Asthma    folic acid 1 mg oral tablet  -- 1 tab(s) by mouth once a day  -- Indication: For Vitamin supplement I will START or STAY ON the medications listed below when I get home from the hospital:    Pulmicort Flexhaler 90 mcg/inh inhalation powder  -- 1 puff(s) inhaled 2 times a day  -- Indication: For Asthma with acute exacerbation, unspecified asthma severity    aspirin 81 mg oral delayed release tablet  -- 1 tab(s) by mouth once a day  -- Indication: For primary prevention    morphine 10 mg/12 hr oral capsule, extended release  -- 1 cap(s) by mouth every 12 hours MDD:20 mg  -- Indication: For Osteoarthritis of multiple joints, unspecified osteoarthritis type    losartan 25 mg oral tablet  -- 1 tab(s) by mouth once a day  -- Indication: For Essential hypertension    sertraline 100 mg oral tablet  -- 1 tab(s) by mouth 2 times a day  -- Indication: For Depression    Ventolin HFA 90 mcg/inh inhalation aerosol  -- 2 puff(s) inhaled every 6 hours as needed for shortness of breath  -- For inhalation only.  It is very important that you take or use this exactly as directed.  Do not skip doses or discontinue unless directed by your doctor.  Obtain medical advice before taking any non-prescription drugs as some may affect the action of this medication.  Shake well before use.    -- Indication: For Asthma with acute exacerbation, unspecified asthma severity    furosemide 40 mg oral tablet  -- 1 tab(s) by mouth once a day  -- Indication: For Essential hypertension    lactulose 10 g/15 mL oral syrup  -- 22.5 milliliter(s) by mouth once a day  -- Indication: For laxative    montelukast 10 mg oral tablet  -- 1 tab(s) by mouth once a day (at bedtime)  -- Indication: For Asthma    folic acid 1 mg oral tablet  -- 1 tab(s) by mouth once a day  -- Indication: For Vitamin supplement

## 2017-09-05 NOTE — DISCHARGE NOTE ADULT - CARE PLAN
Principal Discharge DX:	Acute respiratory failure with hypercapnia  Goal:	Improved breathing  Instructions for follow-up, activity and diet:	Continue to use BiPAP at night to prevent hypercapnic respiratory failure as per pulmonary recommendations.  Limit narcotic use to prevent respiratory depression - continue morphine.  Secondary Diagnosis:	Essential hypertension  Instructions for follow-up, activity and diet:	Continue losartan  Secondary Diagnosis:	Chronic back pain greater than 3 months duration  Instructions for follow-up, activity and diet:	Continue pain management  Limit narcotic use to prevent respiratory depression - continue morphine.  Secondary Diagnosis:	Asthma  Instructions for follow-up, activity and diet:	Continue home medications - montelukast  Secondary Diagnosis:	Hepatic encephalopathy  Instructions for follow-up, activity and diet:	Patient had hyperammonemia with unknown cause. Has been on lactulose inpatient, no need for outpatient since patient's status has improved.  Secondary Diagnosis:	GERALD (obstructive sleep apnea)  Instructions for follow-up, activity and diet:	Continue BiPAP use every night to prevent hypercapnic respiratory failure. Principal Discharge DX:	Acute respiratory failure with hypercapnia  Goal:	Improved breathing  Instructions for follow-up, activity and diet:	Continue to use BiPAP at night to prevent hypercapnic respiratory failure as per pulmonary recommendations.  Limit narcotic use to prevent respiratory depression - continue morphine.  Secondary Diagnosis:	Essential hypertension  Instructions for follow-up, activity and diet:	Continue losartan  Secondary Diagnosis:	Chronic back pain greater than 3 months duration  Instructions for follow-up, activity and diet:	Continue pain management  Limit narcotic use to prevent respiratory depression - continue morphine.  dose has been changed , do not take 60 mg 3 times a day instead now take 10 mg extended release 2 times a day and follow up with pain medicine doctor and taper them off  Secondary Diagnosis:	Asthma  Instructions for follow-up, activity and diet:	Continue home medications - montelukast  albuterol as needed and pulmicort 2 times daily , follow up with Dr. Reyes /Dr. Agosto  Secondary Diagnosis:	Hepatic encephalopathy  Instructions for follow-up, activity and diet:	Patient had hyperammonemia with unknown cause. Has been on lactulose inpatient, no need for outpatient since patient's status has improved.  Secondary Diagnosis:	GERALD (obstructive sleep apnea)  Instructions for follow-up, activity and diet:	Continue BiPAP use every night to prevent hypercapnic respiratory failure.

## 2017-09-05 NOTE — DISCHARGE NOTE ADULT - MEDICATION SUMMARY - MEDICATIONS TO STOP TAKING
I will STOP taking the medications listed below when I get home from the hospital:    Xanax 0.25 mg oral tablet  -- 1 tab(s) by mouth 2 times a day    morphine 12 hour extended release  -- 60 milligram(s) by mouth 3 times a day I will STOP taking the medications listed below when I get home from the hospital:    Xanax 0.25 mg oral tablet  -- 1 tab(s) by mouth 2 times a day    docusate sodium 100 mg oral capsule  -- 1 cap(s) by mouth 3 times a day

## 2017-09-05 NOTE — DISCHARGE NOTE ADULT - CARE PROVIDERS DIRECT ADDRESSES
,[email or web address],DirectAddress_Unknown [email or web address],DirectAddress_Unknown,DirectAddress_Unknown

## 2017-09-05 NOTE — DISCHARGE NOTE ADULT - MEDICATION SUMMARY - MEDICATIONS TO CHANGE
I will SWITCH the dose or number of times a day I take the medications listed below when I get home from the hospital:  None I will SWITCH the dose or number of times a day I take the medications listed below when I get home from the hospital:    morphine 12 hour extended release  -- 60 milligram(s) by mouth 3 times a day

## 2017-09-05 NOTE — DISCHARGE NOTE ADULT - SECONDARY DIAGNOSIS.
Essential hypertension Chronic back pain greater than 3 months duration Asthma Hepatic encephalopathy GERALD (obstructive sleep apnea)

## 2017-09-05 NOTE — DISCHARGE NOTE ADULT - DURABLE MEDICAL EQUIPMENT AGENCY
TRILOGY/[facility]  [phone]

## 2017-09-05 NOTE — DISCHARGE NOTE ADULT - PATIENT PORTAL LINK FT
“You can access the FollowHealth Patient Portal, offered by [facility], by registering with the following website: [email or web address]”

## 2017-09-05 NOTE — DISCHARGE NOTE ADULT - HOSPITAL COURSE
61M w/ PMH of chronic LBP due to OA, on long-acting opioid >10 years, asthma, HTN, pulmonary HTN on home O2 3 L, GERALD, anxiety, spinal stenosis, herniated disks x2, arthritis, s/p hip replacement and spinal surgery presents to the ED c/o weakness. His son states he has fallen 4 times in past 24 hours because his left leg felt weak.  No syncope, LOC, did not hit head, no pain from fall.  Pt denies fever, HA, CP, abd pain.  He was previously admitted to the hospital for 5 days 8/22- 8/26/17 due to SOB and panic attack found to be in opiate withdrawal and had cardiac cath for persistent SOB which showed pulmonary HTN,EF65%, discharged 2 days ago . He states he feels very shaky and his left leg feels weak.  He takes morphine for arthritic pain.  Questionable whether extra doses were taken for pain.  As per son: pt is not acting at baseline.  Pt appears less alert, more confused, poorly able to describe his condition.  Pt poor historian, history obtained via pt and his son.   In ED utox positive for opiate and benzo which are his long term meds, serum ammonia 53  CXR- poor inspiratory film   EKG sinus rhythm with sinus arrhythmia nonspecific ST/T waves, , troponin x2 negative  Head CT- no acute pathology  Was given lactulose and morphine in ED    Since admission, patient has significantly improved. He is mentating well, OOB/ambulating, tolerating BiPAP and sleeping better with it, and it overall stable for DC home today w/ follow up with his PCP, pulmonary, and pain management. 61M w/ PMH of chronic LBP due to OA, on long-acting opioid >10 years, asthma, HTN, pulmonary HTN on home O2 3 L, GERALD, anxiety, spinal stenosis, herniated disks x2, arthritis, s/p hip replacement and spinal surgery presents to the ED c/o weakness. His son states he has fallen 4 times in past 24 hours because his left leg felt weak.  No syncope, LOC, did not hit head, no pain from fall.  Pt denies fever, HA, CP, abd pain.  He was previously admitted to the hospital for 5 days 8/22- 8/26/17 due to SOB and panic attack found to be in opiate withdrawal and had cardiac cath for persistent SOB which showed pulmonary HTN,EF65%, discharged 2 days ago . He states he feels very shaky and his left leg feels weak.  He takes morphine for arthritic pain.  Questionable whether extra doses were taken for pain.  As per son: pt is not acting at baseline.  Pt appears less alert, more confused, poorly able to describe his condition.  Pt poor historian, history obtained via pt and his son.   In ED utox positive for opiate and benzo which are his long term meds, serum ammonia 53  CXR- poor inspiratory film   EKG sinus rhythm with sinus arrhythmia nonspecific ST/T waves, , troponin x2 negative  Head CT- no acute pathology  Was given lactulose and morphine in ED    Since admission, patient has significantly improved. He is mentating well, OOB/ambulating, tolerating BiPAP and sleeping better with it, and it overall stable for DC home today w/ follow up with his PCP, pulmonary, and pain management.    needs to to use trilogy machine at night to preven hypercapnic resp failure

## 2017-09-05 NOTE — DISCHARGE NOTE ADULT - CARE PROVIDER_API CALL
Mick Griggs), Family Medicine  [address] [place] [address]  Phone: [phone]  Fax: [phone]    VIVEK Reaves (), Pulmonary Disease; Sleep Medicine  [address] [place] [address]  Phone: [phone]  Fax: [phone] Mick Griggs), Family Medicine  [address] [place] [address]  Phone: [phone]  Fax: [phone]    VIVEK Reaves (DO), Pulmonary Disease; Sleep Medicine  [address] [place] [address]  Phone: [phone]  Fax: [phone]    Jamaal Foster (DO), PhysicalRehab Medicine  [address] [place] [address]  Phone: [phone]  Fax: [phone] Mick Griggs), Family Medicine  [address] [place] [address]  Phone: [phone]  Fax: [phone]    VIVEK Reaves (DO), Pulmonary Disease; Sleep Medicine  [address] [place] [address]  Phone: [phone]  Fax: [phone]    Jamaal Foster (DO), PhysicalRehab Medicine  [address] [place] [address]  Phone: [phone]  Fax: [phone]

## 2017-09-06 LAB
ANION GAP SERPL CALC-SCNC: 4 MMOL/L — LOW (ref 5–17)
BASOPHILS # BLD AUTO: 0.1 K/UL — SIGNIFICANT CHANGE UP (ref 0–0.2)
BASOPHILS NFR BLD AUTO: 0.9 % — SIGNIFICANT CHANGE UP (ref 0–2)
BUN SERPL-MCNC: 9 MG/DL — SIGNIFICANT CHANGE UP (ref 7–23)
CALCIUM SERPL-MCNC: 8.9 MG/DL — SIGNIFICANT CHANGE UP (ref 8.5–10.1)
CHLORIDE SERPL-SCNC: 102 MMOL/L — SIGNIFICANT CHANGE UP (ref 96–108)
CO2 SERPL-SCNC: 34 MMOL/L — HIGH (ref 22–31)
CREAT SERPL-MCNC: 0.62 MG/DL — SIGNIFICANT CHANGE UP (ref 0.5–1.3)
EOSINOPHIL # BLD AUTO: 0.2 K/UL — SIGNIFICANT CHANGE UP (ref 0–0.5)
EOSINOPHIL NFR BLD AUTO: 1.9 % — SIGNIFICANT CHANGE UP (ref 0–6)
GLUCOSE SERPL-MCNC: 90 MG/DL — SIGNIFICANT CHANGE UP (ref 70–99)
HCT VFR BLD CALC: 31.4 % — LOW (ref 39–50)
HGB BLD-MCNC: 10 G/DL — LOW (ref 13–17)
LYMPHOCYTES # BLD AUTO: 1.1 K/UL — SIGNIFICANT CHANGE UP (ref 1–3.3)
LYMPHOCYTES # BLD AUTO: 13 % — SIGNIFICANT CHANGE UP (ref 13–44)
MCHC RBC-ENTMCNC: 26 PG — LOW (ref 27–34)
MCHC RBC-ENTMCNC: 31.7 GM/DL — LOW (ref 32–36)
MCV RBC AUTO: 82.1 FL — SIGNIFICANT CHANGE UP (ref 80–100)
MONOCYTES # BLD AUTO: 0.5 K/UL — SIGNIFICANT CHANGE UP (ref 0–0.9)
MONOCYTES NFR BLD AUTO: 6.6 % — SIGNIFICANT CHANGE UP (ref 2–14)
NEUTROPHILS # BLD AUTO: 6.4 K/UL — SIGNIFICANT CHANGE UP (ref 1.8–7.4)
NEUTROPHILS NFR BLD AUTO: 77.6 % — HIGH (ref 43–77)
PLATELET # BLD AUTO: 157 K/UL — SIGNIFICANT CHANGE UP (ref 150–400)
POTASSIUM SERPL-MCNC: 3.7 MMOL/L — SIGNIFICANT CHANGE UP (ref 3.5–5.3)
POTASSIUM SERPL-SCNC: 3.7 MMOL/L — SIGNIFICANT CHANGE UP (ref 3.5–5.3)
RBC # BLD: 3.82 M/UL — LOW (ref 4.2–5.8)
RBC # FLD: 15.2 % — HIGH (ref 10.3–14.5)
SODIUM SERPL-SCNC: 140 MMOL/L — SIGNIFICANT CHANGE UP (ref 135–145)
WBC # BLD: 8.3 K/UL — SIGNIFICANT CHANGE UP (ref 3.8–10.5)
WBC # FLD AUTO: 8.3 K/UL — SIGNIFICANT CHANGE UP (ref 3.8–10.5)

## 2017-09-06 RX ADMIN — Medication 3 MILLILITER(S): at 01:39

## 2017-09-06 RX ADMIN — MORPHINE SULFATE 5 MILLIGRAM(S): 50 CAPSULE, EXTENDED RELEASE ORAL at 05:22

## 2017-09-06 RX ADMIN — Medication 81 MILLIGRAM(S): at 11:23

## 2017-09-06 RX ADMIN — MONTELUKAST 10 MILLIGRAM(S): 4 TABLET, CHEWABLE ORAL at 21:04

## 2017-09-06 RX ADMIN — MORPHINE SULFATE 5 MILLIGRAM(S): 50 CAPSULE, EXTENDED RELEASE ORAL at 18:31

## 2017-09-06 RX ADMIN — MORPHINE SULFATE 5 MILLIGRAM(S): 50 CAPSULE, EXTENDED RELEASE ORAL at 00:10

## 2017-09-06 RX ADMIN — MORPHINE SULFATE 5 MILLIGRAM(S): 50 CAPSULE, EXTENDED RELEASE ORAL at 11:50

## 2017-09-06 RX ADMIN — Medication 1 MILLIGRAM(S): at 11:23

## 2017-09-06 RX ADMIN — LOSARTAN POTASSIUM 25 MILLIGRAM(S): 100 TABLET, FILM COATED ORAL at 05:22

## 2017-09-06 RX ADMIN — MORPHINE SULFATE 5 MILLIGRAM(S): 50 CAPSULE, EXTENDED RELEASE ORAL at 18:49

## 2017-09-06 RX ADMIN — Medication 3 MILLILITER(S): at 13:23

## 2017-09-06 RX ADMIN — Medication 3 MILLILITER(S): at 07:36

## 2017-09-06 RX ADMIN — Medication 0.5 MILLIGRAM(S): at 20:19

## 2017-09-06 RX ADMIN — Medication 0.5 MILLIGRAM(S): at 07:36

## 2017-09-06 RX ADMIN — Medication 3 MILLILITER(S): at 20:19

## 2017-09-06 RX ADMIN — MORPHINE SULFATE 5 MILLIGRAM(S): 50 CAPSULE, EXTENDED RELEASE ORAL at 11:23

## 2017-09-06 RX ADMIN — ENOXAPARIN SODIUM 40 MILLIGRAM(S): 100 INJECTION SUBCUTANEOUS at 05:22

## 2017-09-07 VITALS
TEMPERATURE: 99 F | SYSTOLIC BLOOD PRESSURE: 124 MMHG | DIASTOLIC BLOOD PRESSURE: 71 MMHG | HEART RATE: 83 BPM | RESPIRATION RATE: 18 BRPM | OXYGEN SATURATION: 98 %

## 2017-09-07 RX ORDER — BUDESONIDE, MICRONIZED 100 %
1 POWDER (GRAM) MISCELLANEOUS
Qty: 1 | Refills: 0 | OUTPATIENT
Start: 2017-09-07 | End: 2017-10-07

## 2017-09-07 RX ORDER — ALBUTEROL 90 UG/1
2 AEROSOL, METERED ORAL
Qty: 1 | Refills: 0 | OUTPATIENT
Start: 2017-09-07

## 2017-09-07 RX ORDER — MORPHINE SULFATE 50 MG/1
60 CAPSULE, EXTENDED RELEASE ORAL
Qty: 0 | Refills: 0 | COMMUNITY

## 2017-09-07 RX ORDER — MORPHINE SULFATE 50 MG/1
1 CAPSULE, EXTENDED RELEASE ORAL
Qty: 10 | Refills: 0 | OUTPATIENT
Start: 2017-09-07 | End: 2017-09-12

## 2017-09-07 RX ORDER — LACTULOSE 10 G/15ML
22.5 SOLUTION ORAL
Qty: 675 | Refills: 0 | OUTPATIENT
Start: 2017-09-07 | End: 2017-10-07

## 2017-09-07 RX ADMIN — LACTULOSE 15 GRAM(S): 10 SOLUTION ORAL at 11:51

## 2017-09-07 RX ADMIN — ENOXAPARIN SODIUM 40 MILLIGRAM(S): 100 INJECTION SUBCUTANEOUS at 05:20

## 2017-09-07 RX ADMIN — Medication 81 MILLIGRAM(S): at 11:49

## 2017-09-07 RX ADMIN — Medication 0.5 MILLIGRAM(S): at 08:24

## 2017-09-07 RX ADMIN — MORPHINE SULFATE 5 MILLIGRAM(S): 50 CAPSULE, EXTENDED RELEASE ORAL at 05:25

## 2017-09-07 RX ADMIN — Medication 3 MILLILITER(S): at 08:19

## 2017-09-07 RX ADMIN — MORPHINE SULFATE 2 MILLIGRAM(S): 50 CAPSULE, EXTENDED RELEASE ORAL at 00:39

## 2017-09-07 RX ADMIN — Medication 3 MILLILITER(S): at 00:54

## 2017-09-07 RX ADMIN — LOSARTAN POTASSIUM 25 MILLIGRAM(S): 100 TABLET, FILM COATED ORAL at 05:20

## 2017-09-07 RX ADMIN — Medication 1 MILLIGRAM(S): at 11:49

## 2017-09-07 NOTE — PROGRESS NOTE ADULT - PROBLEM SELECTOR PROBLEM 3
GERALD (obstructive sleep apnea)

## 2017-09-07 NOTE — PROGRESS NOTE ADULT - PROBLEM SELECTOR PROBLEM 5
Asthma with acute exacerbation, unspecified asthma severity

## 2017-09-07 NOTE — PROGRESS NOTE ADULT - PROBLEM SELECTOR PLAN 6
-Continue losartan
-Continue losartan
-Continue losartan  -Monitor vitals
-Continue losartan

## 2017-09-07 NOTE — PROGRESS NOTE ADULT - PROBLEM SELECTOR PLAN 7
-Likely secondary to opioid withdrawal, patient off precedex
-Likely secondary to opioid withdrawal, patient off precedex. Stable now.
-Likely secondary to withdrawal, patient being titrated off precedex as of this morning

## 2017-09-07 NOTE — PROGRESS NOTE ADULT - PROBLEM SELECTOR PLAN 1
# Acute metabolic encephalopathy/Hyperammonemia/Multifactorial   -care per intensive care  -noted hyperammonemia, unclear source (patient received lactulose)
#AMS possibly secondary to Acute metabolic encephalopathy vs. Hypercarbic respiratory failure  -care per CCU intensivist  -noted hyperammonemia, patient's mental status improved on lactulose, unclear if clinical improvement secondary to this  -consideration that AMS secondary to hypercarbia as a result of medication effect (chronic benzo/ opioid use)
#AMS possibly secondary to Acute metabolic encephalopathy vs. Hypercarbic respiratory failure in setting of opioid use  -O2 supplementation PRN, BIPAP at night and as needed  -Cautious use of respiratory depressing medications  -Continue lactulose for hyperammonemia (unclear reason for this and if contributory to AMS). No sign of liver dysfunction based on CT and US
#AMS possibly secondary to Acute metabolic encephalopathy vs. Hypercarbic respiratory failure in setting of opioid use  -O2 supplementation PRN, BIPAP at night and as needed. Pt much improved on Bi-PAP   -Cautious use of respiratory depressing medications  -Continue lactulose for hyperammonemia (unclear reason for this and if contributory to AMS). No sign of liver dysfunction based on CT and US  PT eval - no need for further PT  Pain meds will be adjusted upon d/c based on total use and conversion to morphine, was on morphine 60 mg PO 3 times a day
#AMS possibly secondary to Acute metabolic encephalopathy vs. Hypercarbic respiratory failure in setting of opioid use  -O2 supplementation PRN, BIPAP at night and as needed. Pt much improved on Bi-PAP   -Cautious use of respiratory depressing medications  -Continue lactulose for hyperammonemia (unclear reason for this and if contributory to AMS). No sign of liver dysfunction based on CT and US  t/f to floor. MAy benefit from rehab   Pain meds will be adjusted upon d./c based on total use and conversion to morphine , was on morphine 60 mg PO 3 times a day
# Acute metabolic encephalopathy/Hyperammonemia/Multifactorial   -noted hyperammonemia: most recent value 85: etiology unclear   -monitor labs, continue lactulose

## 2017-09-07 NOTE — PROGRESS NOTE ADULT - PROBLEM SELECTOR PLAN 4
-patient on chronic opioids/ benzos at home
-patient on chronic opioids/ benzos at home
-patient on chronic opioids/ benzos at home  -will monitor total opioid and benzo use and adjust medications for home accordingly
-morphine 2mg IV push q6H PRN (patient on chronic opioid therapy for this)

## 2017-09-07 NOTE — PROGRESS NOTE ADULT - PROBLEM SELECTOR PROBLEM 4
Osteoarthritis of multiple joints, unspecified osteoarthritis type

## 2017-09-07 NOTE — PROGRESS NOTE ADULT - PROBLEM SELECTOR PLAN 5
-continue singulair daily, budesonide BID, duonebs PRN
Stable, saturating 100%. Not having SOB.    continue singulair daily, budesonide BID, duonebs PRN
-continue singulair daily, budesonide BID, duonebs PRN

## 2017-09-07 NOTE — PROGRESS NOTE ADULT - ATTENDING COMMENTS
Patient seen and examined with Harriett Talley, Nilesh Romero, Ricky Gil, Chey Ellsworth on the Family Medicine Teaching Service.  Agree with history, physical, labs and plan which were reviewed in detail.
Patient seen and examined with Harriett Talley, Nilesh Romero, Ricky Gil, and Chey Ellsworth on the Family Medicine Teaching Service.  Agree with history, physical, labs and plan which were reviewed in detail.
Patient seen and examined with Harriett Talley, Ricky Gil, and Chey Ellsworth on the Family Medicine Teaching Service.  Agree with history, physical, labs and plan which were reviewed in detail.
Patient seen and examined with Harriett Talley, Nilesh Romero, Ricky Gil, Chey Ellsworth on the Family Medicine Teaching Service.  Agree with history, physical, labs and plan which were reviewed in detail.

## 2017-09-07 NOTE — PROGRESS NOTE ADULT - PROBLEM SELECTOR PROBLEM 2
Acute respiratory failure with hypercapnia

## 2017-09-07 NOTE — PROGRESS NOTE ADULT - PROBLEM SELECTOR PROBLEM 1
Hepatic encephalopathy

## 2017-09-07 NOTE — PROGRESS NOTE ADULT - SUBJECTIVE AND OBJECTIVE BOX
pt has been seen and examined with FP resident, resident supervised, agree with a/p   -on exam- comfortable   -rs-aeeb,cta  -cvs-s1s2 normal   -cns- no focal lesion noted     A/P    #Hypercarbic respiratory failure- resolved   -could not tolerate bipap- d/c on trilology with further management as an outpt     #hyperammonia- resolved, no clear etiology   -ct lactulose     #Chronic back pain- d/c on current dose of morphine and he will follow up with his pain management clinic for further management. pt has meds at home and I have educated him to take dose which we have discharged him on. I have extensively discussed with him about cutting down on narcotics pain meds and be compliant with dose recommendation. I have discussed about risk of developing respiratory failure and its complication which can lead to death, He understood me and verbalized me back     #weight loss encourage and educated     #d/c today if [facility] has been arranged     #time spent more than 30 minutes
pt has been seen and examined with FP resident, resident supervised, agree with a/p   -on exam- comfortable   -rs-aeeb,cta  -cvs-s1s2 normal   -cns- no focal lesion noted     A/P    #Hypercarbic respiratory failure- resolved   -night bipap     #hyperammonia- resolved, no clear etiology   -ct lactulose     #dvt pr     #discharge plan
pt has been seen and examined with FP resident, resident supervised, agree with a/p   -on exam- comfortable   -rs-aeeb,cta  -cvs-s1s2 normal   -cns- no focal lesion noted     A/P    #Hypercarbic respiratory failure- resolved   -night bipap, rest ct same rx     #hyperammonia- resolved, no clear etiology   -ct lactulose     #dvt pr     #discharge plan
61M w/ PMH of chronic LBP due to OA, on long-acting opioid >10 years, asthma, HTN, pulmonary HTN on home O2 3 L, GERALD, anxiety, spinal stenosis, herniated disks x2, arthritis, s/p hip replacement and spinal surgery presents to the ED c/o weakness. His son states he has fallen 4 times in past 24 hours because his left leg felt weak.  No syncope, LOC, did not hit head, no pain from fall.  Pt denies fever, HA, CP, abd pain.  He was previously admitted to the hospital for 5 days 8/22- 8/26/17 due to SOB and panic attack found to be in opiate withdrawal and had cardiac cath for persistent SOB which showed pulmonary HTN,EF65%, discharged 2 days ago . He states he feels very shaky and his left leg feels weak.  He takes morphine for arthritic pain.  Questionable whether extra doses were taken for pain.  As per son: pt is not acting at baseline.  Pt appears less alert, more confused, poorly able to describe his condition.  Pt poor historian, history obtained via pt and his son.   In ED utox positive for opiate and benzo which are his long term meds, serum ammonia 53  CXR- poor inspiratory film   EKG sinus rhythm with sinus arrhythmia nonspecific ST/T waves, , troponin x2 negative  Head CT- no acute pathology  Was given lactulose and morphine in ED    9/1: Patient seen at the bedside. Appears more comfortable. Discussed with patient that we will resume care if intensive care signs off. Hemodynamically stable off BIPAP when seen at the bedside this morning.     9/2: Patient seen and examined at the bedside. Reports his breathing is much better but he still has significant pain as he is on chronic opioid therapy at home that he reports has been titrated down. Discussed with patient plan to monitor 24 hours TDD opioid medications in order to plan for discharge. Patient states he wants to decrease his opioid use and recognizes this as a problem. Discussed with Dr. Kessler case will be signed out to hospitalist team.    9/3 : pt seen and examined at bedside, doing well. No complaints. off sedation will be t/f to floor. Reports having bowel movements.     9/4: Pt seen and examined bedside. Doing much better, OOB/walking around, no SOB. Denies any complaints at this time.    9/5: Pt seen and examined bedside. Much improved, OOB/walking, no SOB/CP/palpitations. He denies any complaints at this time.      PHYSICAL EXAM:    GENERAL: NAD, well-groomed, well-developed  HEAD:  NC/AT  EYES: EOMI, no scleral icterus  CNS:  Alert & Oriented X3, mildly tremulous, otherwise non-focal  LUNG: Clear to auscultation bilaterally; No rales, rhonchi, wheezing, or rubs  HEART: RRR; No murmurs, rubs, or gallops  ABDOMEN: soft, non-tender, + distension (baseline)  EXTREMITIES:  2+ Peripheral Pulses    Vital Signs Last 24 Hrs  T(C): 36.7 (09-05-17 @ 11:01)  T(F): 98.1 (09-05-17 @ 11:01), Max: 99.2 (09-05-17 @ 05:33)  HR: 96 (09-05-17 @ 11:01) (69 - 96)  BP: 149/75 (09-05-17 @ 11:01)  BP(mean): --  RR: 18 (09-05-17 @ 11:01) (17 - 18)  SpO2: 100% (09-05-17 @ 11:01) (95% - 100%)  Wt(kg): --    09-03 @ 07:01  -  09-04 @ 07:00  --------------------------------------------------------  IN: 240 mL / OUT: 300 mL / NET: -60 mL    Lab Results:   Differential:	[] Automated		[] Manual    09-04    138  |  100  |  14  ----------------------------<  93  3.6   |  32<H>  |  0.76    Ca    9.3      04 Sep 2017 05:08
61M w/ PMH of chronic LBP due to OA, on long-acting opioid >10 years, asthma, HTN, pulmonary HTN on home O2 3 L, GERALD, anxiety, spinal stenosis, herniated disks x2, arthritis, s/p hip replacement and spinal surgery presents to the ED c/o weakness. His son states he has fallen 4 times in past 24 hours because his left leg felt weak.  No syncope, LOC, did not hit head, no pain from fall.  Pt denies fever, HA, CP, abd pain.  He was previously admitted to the hospital for 5 days 8/22- 8/26/17 due to SOB and panic attack found to be in opiate withdrawal and had cardiac cath for persistent SOB which showed pulmonary HTN,EF65%, discharged 2 days ago . He states he feels very shaky and his left leg feels weak.  He takes morphine for arthritic pain.  Questionable whether extra doses were taken for pain.  As per son: pt is not acting at baseline.  Pt appears less alert, more confused, poorly able to describe his condition.  Pt poor historian, history obtained via pt and his son.   In ED utox positive for opiate and benzo which are his long term meds, serum ammonia 53  CXR- poor inspiratory film   EKG sinus rhythm with sinus arrhythmia nonspecific ST/T waves, , troponin x2 negative  Head CT- no acute pathology  Was given lactulose and morphine in ED    9/1: Patient seen at the bedside. Appears more comfortable. Discussed with patient that we will resume care if intensive care signs off. Hemodynamically stable off BIPAP when seen at the bedside this morning.     9/2: Patient seen and examined at the bedside. Reports his breathing is much better but he still has significant pain as he is on chronic opioid therapy at home that he reports has been titrated down. Discussed with patient plan to monitor 24 hours TDD opioid medications in order to plan for discharge. Patient states he wants to decrease his opioid use and recognizes this as a problem. Discussed with Dr. Kessler case will be signed out to hospitalist team.    9/3 : pt seen and examined at bedside, doing well. No complaints. off sedation will be t/f to floor. Reports having bowel movements.     9/4: Pt seen and examined bedside. Doing much better, OOB/walking around, no SOB. Denies any complaints at this time.    9/5: Pt seen and examined bedside. Much improved, OOB/walking, no SOB/CP/palpitations. He denies any complaints at this time.    9/6: Pt seen and examined bedside. Continue to feel well, just complaining that he did not sleep well last night. He denies chest pain, SOB, palpitations, etc.     9/7: Pt seen and examined bedside. Patient feels well, but is expressing frustration that the [facility] paperwork has not gone through and that this is holding up his DC home. Otherwise he is feeling well, is OOB/ambulating, breathing well. Denies chest pain, SOB, palpitations, etc.    PHYSICAL EXAM:    GENERAL: NAD, well-groomed, well-developed  HEAD:  NC/AT  EYES: EOMI, no scleral icterus  CNS:  Alert & Oriented X3, mildly tremulous, otherwise non-focal  LUNG: Clear to auscultation bilaterally; No rales, rhonchi, wheezing, or rubs  HEART: RRR; No murmurs, rubs, or gallops  ABDOMEN: soft, non-tender, + distension (baseline)  EXTREMITIES:  2+ Peripheral Pulses    Vital Signs Last 24 Hrs  T(C): 37.2 (09-07-17 @ 10:47)  T(F): 98.9 (09-07-17 @ 10:47), Max: 98.9 (09-07-17 @ 10:47)  HR: 83 (09-07-17 @ 10:47) (74 - 88)  BP: 124/71 (09-07-17 @ 10:47)  BP(mean): --  RR: 18 (09-07-17 @ 10:47) (18 - 18)  SpO2: 98% (09-07-17 @ 10:47) (98% - 100%)  Wt(kg): --    09-06 @ 07:01  -  09-07 @ 07:00  --------------------------------------------------------  IN: 0 mL / OUT: 350 mL / NET: -350 mL        Lab Results:                                            10.0                  Neurophils% (auto):   77.6   (09-06 @ 06:11):    8.3  )-----------(157          Lymphocytes% (auto):  13.0                                          31.4                   Eosinphils% (auto):   1.9      Manual%: Neutrophils x    ; Lymphocytes x    ; Eosinophils x    ; Bands%: x    ; Blasts x         Differential:	[] Automated		[] Manual    09-06    140  |  102  |  9   ----------------------------<  90  3.7   |  34<H>  |  0.62    Ca    8.9      06 Sep 2017 06:11
61M w/ PMH of chronic LBP due to OA, on long-acting opioid >10 years, asthma, HTN, pulmonary HTN on home O2 3 L, GERALD, anxiety, spinal stenosis, herniated disks x2, arthritis, s/p hip replacement and spinal surgery presents to the ED c/o weakness. His son states he has fallen 4 times in past 24 hours because his left leg felt weak.  No syncope, LOC, did not hit head, no pain from fall.  Pt denies fever, HA, CP, abd pain.  He was previously admitted to the hospital for 5 days 8/22- 8/26/17 due to SOB and panic attack found to be in opiate withdrawal and had cardiac cath for persistent SOB which showed pulmonary HTN,EF65%, discharged 2 days ago . He states he feels very shaky and his left leg feels weak.  He takes morphine for arthritic pain.  Questionable whether extra doses were taken for pain.  As per son: pt is not acting at baseline.  Pt appears less alert, more confused, poorly able to describe his condition.  Pt poor historian, history obtained via pt and his son.   In ED utox positive for opiate and benzo which are his long term meds, serum ammonia 53  CXR- poor inspiratory film   EKG sinus rhythm with sinus arrhythmia nonspecific ST/T waves, , troponin x2 negative  Head CT- no acute pathology  Was given lactulose and morphine in ED    9/1: Patient seen at the bedside. Appears more comfortable. Discussed with patient that we will resume care if intensive care signs off. Hemodynamically stable off BIPAP when seen at the bedside this morning.     9/2: Patient seen and examined at the bedside. Reports his breathing is much better but he still has significant pain as he is on chronic opioid therapy at home that he reports has been titrated down. Discussed with patient plan to monitor 24 hours TDD opioid medications in order to plan for discharge. Patient states he wants to decrease his opioid use and recognizes this as a problem. Discussed with Dr. Kessler case will be signed out to hospitalist team.    9/3 : pt seen and examined at bedside, doing well. No complaints. off sedation will be t/f to floor. Reports having bowel movements.     9/4: Pt seen and examined bedside. Doing much better, OOB/walking around, no SOB. Denies any complaints at this time.    9/5: Pt seen and examined bedside. Much improved, OOB/walking, no SOB/CP/palpitations. He denies any complaints at this time.    9/6: Pt seen and examined bedside. Continue to feel well, just complaining that he did not sleep well last night. He denies chest pain, SOB, palpitations, etc.     PHYSICAL EXAM:    GENERAL: NAD, well-groomed, well-developed  HEAD:  NC/AT  EYES: EOMI, no scleral icterus  CNS:  Alert & Oriented X3, mildly tremulous, otherwise non-focal  LUNG: Clear to auscultation bilaterally; No rales, rhonchi, wheezing, or rubs  HEART: RRR; No murmurs, rubs, or gallops  ABDOMEN: soft, non-tender, + distension (baseline)  EXTREMITIES:  2+ Peripheral Pulses    Vital Signs Last 24 Hrs  T(C): 36.8 (09-06-17 @ 05:06)  T(F): 98.3 (09-06-17 @ 05:06), Max: 99 (09-05-17 @ 17:21)  HR: 78 (09-06-17 @ 07:40) (72 - 96)  BP: 144/84 (09-06-17 @ 05:06)  BP(mean): --  RR: 18 (09-06-17 @ 05:06) (18 - 18)  SpO2: 100% (09-06-17 @ 05:06) (97% - 100%)  Wt(kg): --    09-06 @ 07:01  -  09-06 @ 10:19  --------------------------------------------------------  IN: 0 mL / OUT: 350 mL / NET: -350 mL        09-03 @ 07:01  -  09-04 @ 07:00  --------------------------------------------------------  IN: 240 mL / OUT: 300 mL / NET: -60 mL    Lab Results:                                            10.0                  Neurophils% (auto):   77.6   (09-06 @ 06:11):    8.3  )-----------(157          Lymphocytes% (auto):  13.0                                          31.4                   Eosinphils% (auto):   1.9      Manual%: Neutrophils x    ; Lymphocytes x    ; Eosinophils x    ; Bands%: x    ; Blasts x         Differential:	[] Automated		[] Manual    09-06    140  |  102  |  9   ----------------------------<  90  3.7   |  34<H>  |  0.62    Ca    8.9      06 Sep 2017 06:11
CC:        HPI:  61M.  hx chronic LBP due to OA-has been on opiods.  copd  , pulmonary HTN on home O2 3 L, GERALD,   presents to the ED c/o weakness and falls  .      He was previously admitted to the hospital for 5 days - 17 due to SOB and  had cardiac cath for persistent SOB which showed pulmonary HTN,EF65%, discharged 2 days ago .   CXR- ? left effusion, last week had venous doppler and ct angio negative, also increased ammonia level no hx. cirrosis  EKG sinus rhythm with sinus arrhythmia nonspecific ST/T waves, , troponin x2 negative  Head CT- no acute pathology       PMHx:  hypoxia respiratory failure-O2 dependence;  GERALD;  asthma;  HTN;  "chest congestion and leg swelling" w/o cardiac w/u (denies stress test, angiogram, hx of MI, stents or CABG);  chronic LBP due to severe OA-malfunctioning spinal cord stimulator.    PSHx:  AP;  b/l hip replacement;  lamenectomy.    Rx:  reveiwed.    SocHx:  lives in the community.   .  no tobacco.  no current EtOH.    FHx:  no 1st degree relative w/ CAD. (29 Aug 2017 23:37)      PMH:  As above.            MEDICATIONS  (STANDING):  aspirin enteric coated 81 milliGRAM(s) Oral daily  losartan 25 milliGRAM(s) Oral daily  docusate sodium 100 milliGRAM(s) Oral three times a day  montelukast 10 milliGRAM(s) Oral at bedtime  folic acid 1 milliGRAM(s) Oral daily  enoxaparin Injectable 40 milliGRAM(s) SubCutaneous every 24 hours  ALBUTerol/ipratropium for Nebulization 3 milliLiter(s) Nebulizer every 6 hours  buDESOnide   0.5 milliGRAM(s) Respule 0.5 milliGRAM(s) Inhalation every 12 hours  sodium chloride 0.9%. 1000 milliLiter(s) (75 mL/Hr) IV Continuous <Continuous>  potassium chloride  10 mEq/100 mL IVPB 10 milliEquivalent(s) IV Intermittent every 1 hour    MEDICATIONS  (PRN):  ALPRAZolam 0.25 milliGRAM(s) Oral two times a day PRN anxiety  ALBUTerol/ipratropium for Nebulization 3 milliLiter(s) Nebulizer every 4 hours PRN Shortness of Breath and/or Wheezing  morphine  - Injectable 2 milliGRAM(s) IV Push every 6 hours PRN Severe Pain (7 - 10)                General obese  awake, alert weak    ICU Vital Signs Last 24 Hrs  T(C): 36.7 (30 Aug 2017 01:02), Max: 37.3 (29 Aug 2017 16:58)  T(F): 98 (30 Aug 2017 01:02), Max: 99.2 (29 Aug 2017 16:58)  HR: 55 (30 Aug 2017 10:00) (40 - 77)  BP: 140/64 (30 Aug 2017 10:00) (130/60 - 172/62)  BP(mean): 83 (30 Aug 2017 10:00) (78 - 91)  ABP: --  ABP(mean): --  RR: 19 (30 Aug 2017 10:00) (12 - 19)  SpO2: 99% (30 Aug 2017 10:00) (96% - 100%)      respiratory lungs clear  cv - abdomen soft non tender    I&O's Summary    29 Aug 2017 07:01  -  30 Aug 2017 07:00  --------------------------------------------------------  IN: 148 mL / OUT: 0 mL / NET: 148 mL                            8.9    6.9   )-----------( 143      ( 30 Aug 2017 05:29 )             29.4       08-30    139  |  92<L>  |  16  ----------------------------<  88  3.3<L>   |  >45<HH>  |  0.49<L>    Ca    8.7      30 Aug 2017 05:29  Phos  2.3     08-30  Mg     2.1     08-30    TPro  6.3  /  Alb  2.8<L>  /  TBili  0.3  /  DBili  x   /  AST  16  /  ALT  45  /  AlkPhos  83  08-30      CARDIAC MARKERS ( 29 Aug 2017 23:56 )  <0.015 ng/mL / x     / 41 U/L / x     / x      CARDIAC MARKERS ( 29 Aug 2017 20:58 )  <0.015 ng/mL / x     / x     / x     / x      CARDIAC MARKERS ( 29 Aug 2017 17:30 )  <0.015 ng/mL / x     / 51 U/L / x     / x            ABG - ( 30 Aug 2017 05:55 )  pH: 7.25  /  pCO2: 106   /  pO2: 185   / HCO3: 45    / Base Excess: 15    /  SaO2: 99                  Urinalysis Basic - ( 29 Aug 2017 20:06 )    Color: Yellow / Appearance: Clear / S.020 / pH: x  Gluc: x / Ketone: Small  / Bili: Negative / Urobili: 1 mg/dL   Blood: x / Protein: 30 mg/dL / Nitrite: Negative   Leuk Esterase: Trace / RBC: 3-5 /HPF / WBC 6-10   Sq Epi: x / Non Sq Epi: x / Bacteria: Few        DVT Prophylaxis: lovenox                                                               Advanced Directives: full code
CC: sob, agitation, lethargy        HPI:  61M   hx chronic LBP due to OA-has been on opiods.  copd  , pulmonary HTN on home O2 3 L, GERALD,   presents to the ED c/o weakness and falls  .      He was previously admitted to the hospital for 5 days - 17 due to SOB and  had cardiac cath for persistent SOB which showed    pulmonary HTN,EF65%, discharged 2 days ago .   CXR- ? left effusion, last week had venous doppler and ct angio negative, also increased ammonia level no hx. cirrosis, liver sono no evidence of cirrhosis  EKG sinus rhythm with sinus arrhythmia nonspecific ST/T waves, , troponin x2 negative  Head CT- no acute pathology      Overnight with severe agitation,  co2, ammonia better         PMHx:  hypoxia respiratory failure-O2 dependence;  GERALD;  asthma;  HTN;  "chest congestion and leg swelling" w/o cardiac w/u (denies stress test, angiogram, hx of MI, stents or CABG);  chronic LBP due to severe OA-malfunctioning spinal cord stimulator.    PSHx:  AP;  b/l hip replacement;  lamenectomy.    Rx:  reveiwed.    SocHx:  lives in the community.   (lost his wife ~1 year ago).  no tobacco.  no current EtOH.    FHx:  no 1st degree relative w/ CAD. (29 Aug 2017 23:37)      PMH:  As above.                    MEDICATIONS  (STANDING):  aspirin enteric coated 81 milliGRAM(s) Oral daily  losartan 25 milliGRAM(s) Oral daily  docusate sodium 100 milliGRAM(s) Oral three times a day  montelukast 10 milliGRAM(s) Oral at bedtime  folic acid 1 milliGRAM(s) Oral daily  enoxaparin Injectable 40 milliGRAM(s) SubCutaneous every 24 hours  ALBUTerol/ipratropium for Nebulization 3 milliLiter(s) Nebulizer every 6 hours  buDESOnide   0.5 milliGRAM(s) Respule 0.5 milliGRAM(s) Inhalation every 12 hours  sodium chloride 0.9%. 1000 milliLiter(s) (75 mL/Hr) IV Continuous <Continuous>  lactulose Syrup 15 Gram(s) Oral two times a day  potassium acid phosphate/sodium acid phosphate tablet (K-PHOS No. 2) 1 Tablet(s) Oral three times a day with meals  dexmedetomidine Infusion 0.4 MICROgram(s)/kG/Hr (13.5 mL/Hr) IV Continuous <Continuous>  potassium chloride    Tablet ER 40 milliEquivalent(s) Oral once  dexmedetomidine Bolus 140 MICROGram(s) IV Bolus once    MEDICATIONS  (PRN):  ALPRAZolam 0.25 milliGRAM(s) Oral two times a day PRN anxiety  ALBUTerol/ipratropium for Nebulization 3 milliLiter(s) Nebulizer every 4 hours PRN Shortness of Breath and/or Wheezing  morphine  - Injectable 2 milliGRAM(s) IV Push every 6 hours PRN Severe Pain (7 - 10)  morphine   Solution 5 milliGRAM(s) Oral every 6 hours PRN Moderate Pain (4 - 6)                  Weight (kg): 135 ( @ 08:48)    ICU Vital Signs Last 24 Hrs  T(C): 38.5 (31 Aug 2017 05:10), Max: 38.5 (31 Aug 2017 05:10)  T(F): 101.3 (31 Aug 2017 05:10), Max: 101.3 (31 Aug 2017 05:10)  HR: 103 (31 Aug 2017 07:00) (54 - 110)  BP: 172/105 (31 Aug 2017 05:00) (124/77 - 177/74)  BP(mean): 120 (31 Aug 2017 05:00) (77 - 142)  ABP: --  ABP(mean): --  RR: 33 (31 Aug 2017 07:00) (17 - 33)  SpO2: 100% (31 Aug 2017 01:00) (88% - 100%)          I&O's Summary    30 Aug 2017 07:01  -  31 Aug 2017 07:00  --------------------------------------------------------  IN: 420 mL / OUT: 1925 mL / NET: -1505 mL        Physical Exam:  SEE BELOW                          10.7   12.2  )-----------( 205      ( 31 Aug 2017 06:16 )             33.4       -    141  |  99  |  15  ----------------------------<  124<H>  3.4<L>   |  34<H>  |  0.82    Ca    10.0      31 Aug 2017 06:16  Phos  2.3     08-30  Mg     2.1     08-30    TPro  6.3  /  Alb  2.8<L>  /  TBili  0.3  /  DBili  x   /  AST  16  /  ALT  45  /  AlkPhos  83  08-30      CARDIAC MARKERS ( 29 Aug 2017 23:56 )  <0.015 ng/mL / x     / 41 U/L / x     / x      CARDIAC MARKERS ( 29 Aug 2017 20:58 )  <0.015 ng/mL / x     / x     / x     / x      CARDIAC MARKERS ( 29 Aug 2017 17:30 )  <0.015 ng/mL / x     / 51 U/L / x     / x            ABG - ( 31 Aug 2017 01:42 )  pH: 7.50  /  pCO2: 44    /  pO2: 142   / HCO3: 34    / Base Excess: 10    /  SaO2: 99                  Urinalysis Basic - ( 29 Aug 2017 20:06 )    Color: Yellow / Appearance: Clear / S.020 / pH: x  Gluc: x / Ketone: Small  / Bili: Negative / Urobili: 1 mg/dL   Blood: x / Protein: 30 mg/dL / Nitrite: Negative   Leuk Esterase: Trace / RBC: 3-5 /HPF / WBC 6-10   Sq Epi: x / Non Sq Epi: x / Bacteria: Few
INTERVAL/OVERNIGHT EVENTS:     : Patient seen at the bedside. Appears more comfortable. Discussed with patient that we will resume care if intensive care signs off. Hemodynamically stable off BIPAP when seen at the bedside this morning.     MEDICATIONS  (STANDING):  aspirin enteric coated 81 milliGRAM(s) Oral daily  losartan 25 milliGRAM(s) Oral daily  docusate sodium 100 milliGRAM(s) Oral three times a day  montelukast 10 milliGRAM(s) Oral at bedtime  folic acid 1 milliGRAM(s) Oral daily  enoxaparin Injectable 40 milliGRAM(s) SubCutaneous every 24 hours  ALBUTerol/ipratropium for Nebulization 3 milliLiter(s) Nebulizer every 6 hours  buDESOnide   0.5 milliGRAM(s) Respule 0.5 milliGRAM(s) Inhalation every 12 hours  sodium chloride 0.9%. 1000 milliLiter(s) (75 mL/Hr) IV Continuous <Continuous>  lactulose Syrup 15 Gram(s) Oral two times a day  potassium acid phosphate/sodium acid phosphate tablet (K-PHOS No. 2) 1 Tablet(s) Oral three times a day with meals  dexmedetomidine Infusion 0.4 MICROgram(s)/kG/Hr (13.5 mL/Hr) IV Continuous <Continuous>  potassium chloride    Tablet ER 40 milliEquivalent(s) Oral once    MEDICATIONS  (PRN):  ALPRAZolam 0.25 milliGRAM(s) Oral two times a day PRN anxiety  ALBUTerol/ipratropium for Nebulization 3 milliLiter(s) Nebulizer every 4 hours PRN Shortness of Breath and/or Wheezing  morphine  - Injectable 2 milliGRAM(s) IV Push every 6 hours PRN Severe Pain (7 - 10)  morphine   Solution 5 milliGRAM(s) Oral every 6 hours PRN Moderate Pain (4 - 6)    Allergies    No Known Allergies    Vital Signs Last 24 Hrs  T(C): 36.7 (31 Aug 2017 10:01), Max: 38.5 (31 Aug 2017 05:10)  T(F): 98.1 (31 Aug 2017 10:01), Max: 101.3 (31 Aug 2017 05:10)  HR: 92 (31 Aug 2017 11:00) (54 - 110)  BP: 172/105 (31 Aug 2017 05:00) (124/77 - 177/74)  BP(mean): 120 (31 Aug 2017 05:00) (77 - 142)  RR: 30 (31 Aug 2017 11:00) (15 - 33)  SpO2: 99% (31 Aug 2017 11:00) (94% - 100%)  I&O's Summary    30 Aug 2017 07:01  -  31 Aug 2017 07:00  --------------------------------------------------------  IN: 420 mL / OUT: 1925 mL / NET: -1505 mL    Interval Lab Results:                        10.7   12.2  )-----------( 205      ( 31 Aug 2017 06:16 )             33.4                         8.9    6.9   )-----------( 143      ( 30 Aug 2017 05:29 )             29.4                         10.2   9.6   )-----------( 157      ( 29 Aug 2017 17:30 )             32.6                               141    |  99     |  15                  Calcium: 10.0  / iCa: x      ( @ 06:16)    ----------------------------<  124       Magnesium: x                                3.4     |  34     |  0.82             Phosphorous: x          Urinalysis Basic - ( 29 Aug 2017 20:06 )    Color: Yellow / Appearance: Clear / S.020 / pH: x  Gluc: x / Ketone: Small  / Bili: Negative / Urobili: 1 mg/dL   Blood: x / Protein: 30 mg/dL / Nitrite: Negative   Leuk Esterase: Trace / RBC: 3-5 /HPF / WBC 6-10   Sq Epi: x / Non Sq Epi: x / Bacteria: Few
INTERVAL/OVERNIGHT EVENTS:     : Patient seen at the bedside. Discussed with son regarding overall plan of care, they understand that patient will be managed for time being by intensive care and we will resume management if patient downgraded. Case d/w Dr. Kessler, labs and vitals reviewed.    MEDICATIONS  (STANDING):  aspirin enteric coated 81 milliGRAM(s) Oral daily  losartan 25 milliGRAM(s) Oral daily  docusate sodium 100 milliGRAM(s) Oral three times a day  montelukast 10 milliGRAM(s) Oral at bedtime  folic acid 1 milliGRAM(s) Oral daily  enoxaparin Injectable 40 milliGRAM(s) SubCutaneous every 24 hours  ALBUTerol/ipratropium for Nebulization 3 milliLiter(s) Nebulizer every 6 hours  buDESOnide   0.5 milliGRAM(s) Respule 0.5 milliGRAM(s) Inhalation every 12 hours  sodium chloride 0.9%. 1000 milliLiter(s) (75 mL/Hr) IV Continuous <Continuous>  lactulose Syrup 15 Gram(s) Oral two times a day  potassium acid phosphate/sodium acid phosphate tablet (K-PHOS No. 2) 1 Tablet(s) Oral three times a day with meals  dexmedetomidine Infusion 0.4 MICROgram(s)/kG/Hr (13.5 mL/Hr) IV Continuous <Continuous>  potassium chloride    Tablet ER 40 milliEquivalent(s) Oral once    MEDICATIONS  (PRN):  ALPRAZolam 0.25 milliGRAM(s) Oral two times a day PRN anxiety  ALBUTerol/ipratropium for Nebulization 3 milliLiter(s) Nebulizer every 4 hours PRN Shortness of Breath and/or Wheezing  morphine  - Injectable 2 milliGRAM(s) IV Push every 6 hours PRN Severe Pain (7 - 10)  morphine   Solution 5 milliGRAM(s) Oral every 6 hours PRN Moderate Pain (4 - 6)    Allergies    No Known Allergies    REVIEW OF SYSTEMS:    CONSTITUTIONAL: No weakness, fevers or chills  EYES/ENT: No visual changes;  No vertigo or throat pain   NECK: No pain or stiffness  RESPIRATORY: No cough, wheezing, hemoptysis; No shortness of breath  CARDIOVASCULAR: No chest pain or palpitations  GASTROINTESTINAL: No abdominal or epigastric pain. No nausea, vomiting, or hematemesis; No diarrhea or constipation. No melena or hematochezia.  GENITOURINARY: No dysuria, frequency or hematuria  NEUROLOGICAL: No numbness or weakness  SKIN: No itching, burning, rashes, or lesions   All other review of systems is negative unless indicated above.    Vital Signs Last 24 Hrs  T(C): 36.7 (31 Aug 2017 10:01), Max: 38.5 (31 Aug 2017 05:10)  T(F): 98.1 (31 Aug 2017 10:01), Max: 101.3 (31 Aug 2017 05:10)  HR: 92 (31 Aug 2017 11:00) (54 - 110)  BP: 172/105 (31 Aug 2017 05:00) (124/77 - 177/74)  BP(mean): 120 (31 Aug 2017 05:00) (77 - 142)  RR: 30 (31 Aug 2017 11:00) (15 - 33)  SpO2: 99% (31 Aug 2017 11:00) (94% - 100%)  I&O's Summary    30 Aug 2017 07:01  -  31 Aug 2017 07:00  --------------------------------------------------------  IN: 420 mL / OUT: 1925 mL / NET: -1505 mL    Interval Lab Results:                        10.7   12.2  )-----------( 205      ( 31 Aug 2017 06:16 )             33.4                         8.9    6.9   )-----------( 143      ( 30 Aug 2017 05:29 )             29.4                         10.2   9.6   )-----------( 157      ( 29 Aug 2017 17:30 )             32.6                               141    |  99     |  15                  Calcium: 10.0  / iCa: x      ( @ 06:16)    ----------------------------<  124       Magnesium: x                                3.4     |  34     |  0.82             Phosphorous: x          Urinalysis Basic - ( 29 Aug 2017 20:06 )    Color: Yellow / Appearance: Clear / S.020 / pH: x  Gluc: x / Ketone: Small  / Bili: Negative / Urobili: 1 mg/dL   Blood: x / Protein: 30 mg/dL / Nitrite: Negative   Leuk Esterase: Trace / RBC: 3-5 /HPF / WBC 6-10   Sq Epi: x / Non Sq Epi: x / Bacteria: Few        INTERVAL IMAGING STUDIES:    A/P:   This is a Patient is a 61y old  Male who presents with a chief complaint of weakness, multiple falls, SOB (30 Aug 2017 03:49)
INTERVAL/OVERNIGHT EVENTS:   61 M PMhx chronic LBP due to OA-has been on long acting  opioid  > 10 years. asthma, HTN, pulmonary HTN on home O2 3 L, GERALD, anxiety, spinal stenosis, herniated disks x2, arthritis, s/p hip replacement and spinal surgery presents to the ED c/o weakness. His son states he has fallen 4 times in past 24 hours because his left leg felt weak.  No syncope, LOC, did not hit head, no pain from fall.  Pt denies fever, HA, CP, abd pain.  He was previously admitted to the hospital for 5 days - 17 due to SOB and panic attack found to be in opiate withdrawal and had cardiac cath for persistent SOB which showed pulmonary HTN,EF65%, discharged 2 days ago . He states he feels very shaky and his left leg feels weak.  He takes morphine for arthritic pain.  Questionable whether extra doses were taken for pain.  As per son: pt is not acting at baseline.  Pt appears less alert, more confused, poorly able to describe his condition.  Pt poor historian, history obtained via pt and his son.   In ED utox positive for opiate and benzo which are his long term meds, serum ammonia 53  CXR- poor inspiratory film   EKG sinus rhythm with sinus arrhythmia nonspecific ST/T waves, , troponin x2 negative  Head CT- no acute pathology  Was given lactulose and morphine in ED    : Patient seen at the bedside. Appears more comfortable. Discussed with patient that we will resume care if intensive care signs off. Hemodynamically stable off BIPAP when seen at the bedside this morning.     : Patient seen and examined at the bedside. Reports his breathing is much better but he still has significant pain as he is on chronic opioid therapy at home that he reports has been titrated down. Discussed with patient plan to monitor 24 hours TDD opioid medications in order to plan for discharge. Patient states he wants to decrease his opioid use and recognizes this as a problem. Discussed with Dr. Kessler case will be signed out to hospitalist team.    MEDICATIONS  (STANDING):  aspirin enteric coated 81 milliGRAM(s) Oral daily  losartan 25 milliGRAM(s) Oral daily  docusate sodium 100 milliGRAM(s) Oral three times a day  montelukast 10 milliGRAM(s) Oral at bedtime  folic acid 1 milliGRAM(s) Oral daily  enoxaparin Injectable 40 milliGRAM(s) SubCutaneous every 24 hours  ALBUTerol/ipratropium for Nebulization 3 milliLiter(s) Nebulizer every 6 hours  buDESOnide   0.5 milliGRAM(s) Respule 0.5 milliGRAM(s) Inhalation every 12 hours  sodium chloride 0.9%. 1000 milliLiter(s) (75 mL/Hr) IV Continuous <Continuous>  lactulose Syrup 15 Gram(s) Oral two times a day  potassium acid phosphate/sodium acid phosphate tablet (K-PHOS No. 2) 1 Tablet(s) Oral three times a day with meals  dexmedetomidine Infusion 0.4 MICROgram(s)/kG/Hr (13.5 mL/Hr) IV Continuous <Continuous>  potassium chloride    Tablet ER 40 milliEquivalent(s) Oral once    MEDICATIONS  (PRN):  ALPRAZolam 0.25 milliGRAM(s) Oral two times a day PRN anxiety  ALBUTerol/ipratropium for Nebulization 3 milliLiter(s) Nebulizer every 4 hours PRN Shortness of Breath and/or Wheezing  morphine  - Injectable 2 milliGRAM(s) IV Push every 6 hours PRN Severe Pain (7 - 10)  morphine   Solution 5 milliGRAM(s) Oral every 6 hours PRN Moderate Pain (4 - 6)    Allergies    No Known Allergies    Vital Signs Last 24 Hrs  T(C): 36.7 (31 Aug 2017 10:01), Max: 38.5 (31 Aug 2017 05:10)  T(F): 98.1 (31 Aug 2017 10:01), Max: 101.3 (31 Aug 2017 05:10)  HR: 92 (31 Aug 2017 11:00) (54 - 110)  BP: 172/105 (31 Aug 2017 05:00) (124/77 - 177/74)  BP(mean): 120 (31 Aug 2017 05:00) (77 - 142)  RR: 30 (31 Aug 2017 11:00) (15 - 33)  SpO2: 99% (31 Aug 2017 11:00) (94% - 100%)  I&O's Summary    PHYSICAL EXAM:    GENERAL: NAD, well-groomed, well-developed  HEAD:  NC/AT  EYES: EOMI, no scleral icterus  CNS:  Alert & Oriented X3, mildly tremulous, otherwise non-focal  LUNG: Clear to auscultation bilaterally; No rales, rhonchi, wheezing, or rubs  HEART: RRR; No murmurs, rubs, or gallops  ABDOMEN: +BS, ST/ND/NT  EXTREMITIES:  2+ Peripheral Pulses    30 Aug 2017 07:01  -  31 Aug 2017 07:00  --------------------------------------------------------  IN: 420 mL / OUT: 1925 mL / NET: -1505 mL    Interval Lab Results:                        10.7   12.2  )-----------( 205      ( 31 Aug 2017 06:16 )             33.4                         8.9    6.9   )-----------( 143      ( 30 Aug 2017 05:29 )             29.4                         10.2   9.6   )-----------( 157      ( 29 Aug 2017 17:30 )             32.6                               141    |  99     |  15                  Calcium: 10.0  / iCa: x      ( @ 06:16)    ----------------------------<  124       Magnesium: x                                3.4     |  34     |  0.82             Phosphorous: x          Urinalysis Basic - ( 29 Aug 2017 20:06 )    Color: Yellow / Appearance: Clear / S.020 / pH: x  Gluc: x / Ketone: Small  / Bili: Negative / Urobili: 1 mg/dL   Blood: x / Protein: 30 mg/dL / Nitrite: Negative   Leuk Esterase: Trace / RBC: 3-5 /HPF / WBC 6-10   Sq Epi: x / Non Sq Epi: x / Bacteria: Few
INTERVAL/OVERNIGHT EVENTS:   61 M PMhx chronic LBP due to OA-has been on long acting  opioid  > 10 years. asthma, HTN, pulmonary HTN on home O2 3 L, GERALD, anxiety, spinal stenosis, herniated disks x2, arthritis, s/p hip replacement and spinal surgery presents to the ED c/o weakness. His son states he has fallen 4 times in past 24 hours because his left leg felt weak.  No syncope, LOC, did not hit head, no pain from fall.  Pt denies fever, HA, CP, abd pain.  He was previously admitted to the hospital for 5 days 8/22- 8/26/17 due to SOB and panic attack found to be in opiate withdrawal and had cardiac cath for persistent SOB which showed pulmonary HTN,EF65%, discharged 2 days ago . He states he feels very shaky and his left leg feels weak.  He takes morphine for arthritic pain.  Questionable whether extra doses were taken for pain.  As per son: pt is not acting at baseline.  Pt appears less alert, more confused, poorly able to describe his condition.  Pt poor historian, history obtained via pt and his son.   In ED utox positive for opiate and benzo which are his long term meds, serum ammonia 53  CXR- poor inspiratory film   EKG sinus rhythm with sinus arrhythmia nonspecific ST/T waves, , troponin x2 negative  Head CT- no acute pathology  Was given lactulose and morphine in ED    9/1: Patient seen at the bedside. Appears more comfortable. Discussed with patient that we will resume care if intensive care signs off. Hemodynamically stable off BIPAP when seen at the bedside this morning.     9/2: Patient seen and examined at the bedside. Reports his breathing is much better but he still has significant pain as he is on chronic opioid therapy at home that he reports has been titrated down. Discussed with patient plan to monitor 24 hours TDD opioid medications in order to plan for discharge. Patient states he wants to decrease his opioid use and recognizes this as a problem. Discussed with Dr. Kessler case will be signed out to hospitalist team.    9/3 : pt seen and examined at bedside, doing well. No complaints. off sedation will be t/f to floor. reports Bowel movments.     MEDICATIONS  (STANDING):  aspirin enteric coated 81 milliGRAM(s) Oral daily  losartan 25 milliGRAM(s) Oral daily  montelukast 10 milliGRAM(s) Oral at bedtime  folic acid 1 milliGRAM(s) Oral daily  enoxaparin Injectable 40 milliGRAM(s) SubCutaneous every 24 hours  ALBUTerol/ipratropium for Nebulization 3 milliLiter(s) Nebulizer every 6 hours  buDESOnide   0.5 milliGRAM(s) Respule 0.5 milliGRAM(s) Inhalation every 12 hours  lactulose Syrup 15 Gram(s) Oral daily    MEDICATIONS  (PRN):  ALPRAZolam 0.25 milliGRAM(s) Oral two times a day PRN anxiety  ALBUTerol/ipratropium for Nebulization 3 milliLiter(s) Nebulizer every 4 hours PRN Shortness of Breath and/or Wheezing  morphine  - Injectable 2 milliGRAM(s) IV Push every 6 hours PRN Severe Pain (7 - 10)  morphine   Solution 5 milliGRAM(s) Oral every 6 hours PRN Moderate Pain (4 - 6)  ondansetron   Disintegrating Tablet 4 milliGRAM(s) Oral every 6 hours PRN Nausea and/or Vomiting    Allergies    No Known Allergies    PHYSICAL EXAM:    GENERAL: NAD, well-groomed, well-developed  HEAD:  NC/AT  EYES: EOMI, no scleral icterus  CNS:  Alert & Oriented X3, mildly tremulous, otherwise non-focal  LUNG: Clear to auscultation bilaterally; No rales, rhonchi, wheezing, or rubs  HEART: RRR; No murmurs, rubs, or gallops  ABDOMEN: +BS, ST/ND/NT  EXTREMITIES:  2+ Peripheral Pulses      T(C): 37.1 (09-03-17 @ 17:03), Max: 37.2 (09-03-17 @ 14:53)  HR: 73 (09-03-17 @ 17:03) (68 - 97)  BP: 133/111 (09-03-17 @ 17:03) (114/95 - 152/83)  RR: 17 (09-03-17 @ 17:03) (17 - 26)  SpO2: 100% (09-03-17 @ 17:03) (94% - 100%)  Wt(kg): --                              10.8   9.1   )-----------( 180      ( 03 Sep 2017 05:20 )             34.4     03 Sep 2017 05:20    140    |  103    |  14     ----------------------------<  86     3.7     |  31     |  0.65     Ca    9.2        03 Sep 2017 05:20      < from: Xray Chest 1 View AP/PA. (08.31.17 @ 13:27) >    IMPRESSION: No acute disease.
INTERVAL/OVERNIGHT EVENTS: This is a 61M w/ PMHx chronic LBP due to OA-has been on long acting  opioid  > 10 years. Asthma, HTN, pulmonary HTN on home O2 3 L, GERALD, anxiety, spinal stenosis, herniated disks x2, arthritis, s/p hip replacement and spinal surgery presents to the ED c/o weakness. His son states he has fallen 4 times in past 24 hours because his left leg felt weak.  No syncope, LOC, did not hit head, no pain from fall.  Pt denies fever, HA, CP, abd pain.  He was previously admitted to the hospital for 5 days - 17 due to SOB and panic attack found to be in opiate withdrawal and had cardiac cath for persistent SOB which showed pulmonary HTN,EF65%, discharged 2 days ago . He states he feels very shaky and his left leg feels weak.  He takes morphine for arthritic pain.  Questionable whether extra doses were taken for pain.  As per son: pt is not acting at baseline.  Pt appears less alert, more confused, poorly able to describe his condition.  Pt poor historian, history obtained via pt and his son.   In ED utox positive for opiate and benzo which are his long term meds, serum ammonia 53  CXR- poor inspiratory film   EKG sinus rhythm with sinus arrhythmia nonspecific ST/T waves, , troponin x2 negative  Head CT- no acute pathology  Was given lactulose and morphine in ED    : Patient seen and examined at the bedside and re-examined this afternoon. Initially patient was AOx2, noted to be on BIPAP machine and complaining of shortness of breath without additional acute complaints. When patient was re-evaluated, he is now off BIPAP and reports that SOB has improved. Denies fever, chills, CP, abdominal pain, N/V/D, headache.     MEDICATIONS  (STANDING):  aspirin enteric coated 81 milliGRAM(s) Oral daily  losartan 25 milliGRAM(s) Oral daily  docusate sodium 100 milliGRAM(s) Oral three times a day  montelukast 10 milliGRAM(s) Oral at bedtime  folic acid 1 milliGRAM(s) Oral daily  enoxaparin Injectable 40 milliGRAM(s) SubCutaneous every 24 hours  ALBUTerol/ipratropium for Nebulization 3 milliLiter(s) Nebulizer every 6 hours  buDESOnide   0.5 milliGRAM(s) Respule 0.5 milliGRAM(s) Inhalation every 12 hours  sodium chloride 0.9%. 1000 milliLiter(s) (75 mL/Hr) IV Continuous <Continuous>  lactulose Syrup 15 Gram(s) Oral two times a day    MEDICATIONS  (PRN):  ALPRAZolam 0.25 milliGRAM(s) Oral two times a day PRN anxiety  ALBUTerol/ipratropium for Nebulization 3 milliLiter(s) Nebulizer every 4 hours PRN Shortness of Breath and/or Wheezing  morphine  - Injectable 2 milliGRAM(s) IV Push every 6 hours PRN Severe Pain (7 - 10)    Allergies    No Known Allergies    REVIEW OF SYSTEMS:    CONSTITUTIONAL: No weakness, fevers or chills  EYES/ENT: No visual changes;  No vertigo or throat pain   NECK: No pain or stiffness  RESPIRATORY: No cough, wheezing, hemoptysis;   CARDIOVASCULAR: No chest pain or palpitations  GASTROINTESTINAL: No abdominal or epigastric pain. No nausea, vomiting, or hematemesis; No diarrhea or constipation. No melena or hematochezia.  GENITOURINARY: No dysuria, frequency or hematuria  NEUROLOGICAL: No numbness or weakness  SKIN: No itching, burning, rashes, or lesions   All other review of systems is negative unless indicated above.    Vital Signs Last 24 Hrs  T(C): 36.7 (30 Aug 2017 16:18), Max: 36.7 (30 Aug 2017 01:02)  T(F): 98 (30 Aug 2017 16:18), Max: 98 (30 Aug 2017 01:02)  HR: 54 (30 Aug 2017 13:40) (40 - 77)  BP: 140/64 (30 Aug 2017 10:00) (130/60 - 172/62)  BP(mean): 83 (30 Aug 2017 10:00) (78 - 91)  RR: 19 (30 Aug 2017 10:00) (12 - 19)  SpO2: 98% (30 Aug 2017 13:40) (96% - 100%)  I&O's Summary    29 Aug 2017 07:01  -  30 Aug 2017 07:00  --------------------------------------------------------  IN: 148 mL / OUT: 0 mL / NET: 148 mL    30 Aug 2017 07:01  -  30 Aug 2017 17:28  --------------------------------------------------------  IN: 0 mL / OUT: 425 mL / NET: -425 mL      VS reviewed, stable.  Gen: patient is in no acute distress  HEENT: NC/AT, no conjunctivitis or scleral icterus; no nasal discharge or congestion.   Neck: FROM, supple, no cervical LAD  Chest: CTA b/l, no crackles/wheezes, good air entry, no tachypnea or retractions  CV: regular rate and rhythm, no murmurs   Abd: soft, nontender, nondistended, no HSM appreciated, +BS  Extrem: 2+ peripheral pulses, WWP.   Neuro: non-focal    Interval Lab Results:                        8.9    6.9   )-----------( 143      ( 30 Aug 2017 05:29 )             29.4                         10.2   9.6   )-----------( 157      ( 29 Aug 2017 17:30 )             32.6                               139    |  92     |  16                  Calcium: 8.7   / iCa: x      ( @ 05:29)    ----------------------------<  88        Magnesium: 2.1                              3.3     |  >45    |  0.49             Phosphorous: 2.3      TPro  6.3    /  Alb  2.8    /  TBili  0.3    /  DBili  x      /  AST  16     /  ALT  45     /  AlkPhos  83     30 Aug 2017 05:29    Urinalysis Basic - ( 29 Aug 2017 20:06 )    Color: Yellow / Appearance: Clear / S.020 / pH: x  Gluc: x / Ketone: Small  / Bili: Negative / Urobili: 1 mg/dL   Blood: x / Protein: 30 mg/dL / Nitrite: Negative   Leuk Esterase: Trace / RBC: 3-5 /HPF / WBC 6-10   Sq Epi: x / Non Sq Epi: x / Bacteria: Few        INTERVAL IMAGING STUDIES:    A/P:   This is a Patient is a 61y old  Male who presents with a chief complaint of weakness, multiple falls, SOB (30 Aug 2017 03:49)
Patient is a 61y old  Male who presents with a chief complaint of weakness, multiple falls, SOB (30 Aug 2017 03:49)      HPI:  61M.  hx chronic LBP due to OA-has been on long acting  opioid  > 10 years. asthma, HTN, pulmonary HTN on home O2 3 L, GERALD, anxiety, spinal stenosis, herniated disks x2, arthritis, s/p hip replacement and spinal surgery presents to the ED c/o weakness. His son states he has fallen 4 times in past 24 hours because his left leg felt weak.  No syncope, LOC, did not hit head, no pain from fall.  Pt denies fever, HA, CP, abd pain.  He was previously admitted to the hospital for 5 days - 17 due to SOB and panic attack found to be in opiate withdrawal and had cardiac cath for persistent SOB which showed pulmonary HTN,EF65%, discharged 2 days ago . He states he feels very shaky and his left leg feels weak.  He takes morphine for arthritic pain.  Questionable whether extra doses were taken for pain.  As per son: pt is not acting at baseline.  Pt appears less alert, more confused, poorly able to describe his condition.  Pt poor historian, history obtained via pt and his son.   In ED utox positive for opiate and benzo which are his long term meds, serum ammonia 53  CXR- poor inspiratory film   EKG sinus rhythm with sinus arrhythmia nonspecific ST/T waves, , troponin x2 negative  Head CT- no acute pathology  Was given lactulose and morphine in ED    pt's status is reveiwed with his son at bedside and incraesed morbidity and mortality associated with his condition also rev in details in person.  pt is lethargic but arousable  intermittently tolerated BIPAP  data also rev with residents and Intensivist  critical pulm statuis with acute on chronic hypercapnic resp failure discuissed as well  increased risk of intubation / ventilatory support needed with tracheostomy if he can not tolertae BIPAP    Patient denies SOB,cough,fever,chills,CP,abdominal pain ,headache ,nausea,vomiting      moreconfused  critical care RN at bedside addressing withdrawal syndrome  tolerated BIPAP overnight  no cp      PMHx:  hypoxia respiratory failure-O2 dependence;  GERALD;  asthma;  HTN;  "chest congestion and leg swelling" w/o cardiac w/u (denies stress test, angiogram, hx of MI, stents or CABG);  chronic LBP due to severe OA-malfunctioning spinal cord stimulator.    PSHx:  AP;  b/l hip replacement;  lamenectomy.    Rx:  reveiwed.    SocHx:  lives in the community.   (lost his wife ~1 year ago).  no tobacco.  no current EtOH.    FHx:  no 1st degree relative w/ CAD. (29 Aug 2017 23:37)      PAST MEDICAL & SURGICAL HISTORY:  Neuritis  GERALD (obstructive sleep apnea)  Osteoarthritis  Chronic back pain greater than 3 months duration  Asthma  HTN (hypertension)  S/P hip replacement: bilateral  S/P spinal surgery  S/P appendectomy    < from: Cardiac Cath Lab - Adult (17 @ 08:35) >   Impression     Diagnostic Conclusions     Normal LV systolic function. Estimated LV ejection fraction is 65 %.   Normal coronary arteries.   Elevated left ventricular end-diastolic pressure.   Hemodynamic status is abnormal.   Severe pulmonary artery hypertension.    < end of copied text >    PREVIOUS DIAGNOSTIC TESTING:      MEDICATIONS  (STANDING):  aspirin enteric coated 81 milliGRAM(s) Oral daily  losartan 25 milliGRAM(s) Oral daily  docusate sodium 100 milliGRAM(s) Oral three times a day  montelukast 10 milliGRAM(s) Oral at bedtime  folic acid 1 milliGRAM(s) Oral daily  enoxaparin Injectable 40 milliGRAM(s) SubCutaneous every 24 hours  ALBUTerol/ipratropium for Nebulization 3 milliLiter(s) Nebulizer every 6 hours  buDESOnide   0.5 milliGRAM(s) Respule 0.5 milliGRAM(s) Inhalation every 12 hours  sodium chloride 0.9%. 1000 milliLiter(s) (75 mL/Hr) IV Continuous <Continuous>  lactulose Syrup 15 Gram(s) Oral two times a day  potassium acid phosphate/sodium acid phosphate tablet (K-PHOS No. 2) 1 Tablet(s) Oral three times a day with meals  dexmedetomidine Infusion 0.4 MICROgram(s)/kG/Hr (13.5 mL/Hr) IV Continuous <Continuous>  potassium chloride    Tablet ER 40 milliEquivalent(s) Oral once  dexmedetomidine Bolus 140 MICROGram(s) IV Bolus once      MEDICATIONS  (PRN):  ALPRAZolam 0.25 milliGRAM(s) Oral two times a day PRN anxiety  ALBUTerol/ipratropium for Nebulization 3 milliLiter(s) Nebulizer every 4 hours PRN Shortness of Breath and/or Wheezing  morphine  - Injectable 2 milliGRAM(s) IV Push every 6 hours PRN Severe Pain (7 - 10)      FAMILY HISTORY:  No pertinent family history in first degree relatives      SOCIAL HISTORY:  ***    REVIEW OF SYSTEM:  Pertinent items are noted in HPI.  Constitutional negative for chills, fevers, sweats and weight loss  throat, and face:  negative for epistaxis, nasal congestion, sore throat and   tinnitus  Respiratory:pos for cough, dyspnea on exertion, pleuritic chest pain  and wheezing  Cardiovascular:  negative for chest pain, dyspnea and palpitations  Gastrointestinal: negative for abdominal pain, diarrhea, nausea and vomiting  Genitourinary: negative for dysuria, frequency and urinary incontinence  Hematologic/lymphatic: negative for bleeding and easy bruising  Musculoskeletal: negative for arthralgias, back pain and muscle weakness  Neurological:pos for dizziness, headaches, seizures and tremors    Vital Signs Last 24 Hrs  T(C): 38.5 (31 Aug 2017 05:10), Max: 38.5 (31 Aug 2017 05:10)  T(F): 101.3 (31 Aug 2017 05:10), Max: 101.3 (31 Aug 2017 05:10)  HR: 86 (31 Aug 2017 08:57) (54 - 110)  BP: 172/105 (31 Aug 2017 05:00) (124/77 - 177/74)  BP(mean): 120 (31 Aug 2017 05:00) (77 - 142)  RR: 33 (31 Aug 2017 07:00) (17 - 33)  SpO2: 95% (31 Aug 2017 08:57) (88% - 100%)    I&O's Summary    29 Aug 2017 07:01  -  30 Aug 2017 07:00  --------------------------------------------------------  IN: 148 mL / OUT: 0 mL / NET: 148 mL      PHYSICAL EXAM  General Appearance: obese, lethargic, opens eyes andf spekas after verbal stimuli, son at bedside  pt verbalizes being in the hospital and 2017 date   HEENT: PERRL, conjunctiva clear, Neck: Supple, , no adenopathy,  Lungs:decreased BS lower to mid lung fileds, mild exp wheeze, basilar crackles  Heart: Regular rate and rhythm, S1, S2 normal, no murmur, rub or gallop  Abdomen: Soft, non-tender, bowel sounds active , no hepatosplenomegaly  Extremities: chronic lower ext edema  Skin: Skin color, texture normal, no rashes   Neurologic: lethargic, moves all 4 exts    ECG:    LABS:      ABG - ( 31 Aug 2017 01:42 )  pH: 7.50  /  pCO2: 44    /  pO2: 142   / HCO3: 34    / Base Excess: 10    /  SaO2: 99                                      8.9    6.9   )-----------( 143      ( 30 Aug 2017 05:29 )             29.4     08-30    139  |  92<L>  |  16  ----------------------------<  88  3.3<L>   |  >45<HH>  |  0.49<L>    Ca    8.7      30 Aug 2017 05:29  Phos  2.3       Mg     2.1         TPro  6.3  /  Alb  2.8<L>  /  TBili  0.3  /  DBili  x   /  AST  16  /  ALT  45  /  AlkPhos  83  0830    CARDIAC MARKERS ( 29 Aug 2017 23:56 )  <0.015 ng/mL / x     / 41 U/L / x     / x      CARDIAC MARKERS ( 29 Aug 2017 20:58 )  <0.015 ng/mL / x     / x     / x     / x      CARDIAC MARKERS ( 29 Aug 2017 17:30 )  <0.015 ng/mL / x     / 51 U/L / x     / x            Pro BNP  138  @ 17:30  D Dimer  --  @ 17:30    PT/INR - ( 29 Aug 2017 17:30 )   PT: 11.4 sec;   INR: 1.05 ratio         PTT - ( 29 Aug 2017 17:30 )  PTT:31.3 sec  Urinalysis Basic - ( 29 Aug 2017 20:06 )    Color: Yellow / Appearance: Clear / S.020 / pH: x  Gluc: x / Ketone: Small  / Bili: Negative / Urobili: 1 mg/dL   Blood: x / Protein: 30 mg/dL / Nitrite: Negative   Leuk Esterase: Trace / RBC: 3-5 /HPF / WBC 6-10   Sq Epi: x / Non Sq Epi: x / Bacteria: Few      ABG - ( 30 Aug 2017 05:55 )  pH: 7.25  /  pCO2: 106   /  pO2: 185   / HCO3: 45    / Base Excess: 15    /  SaO2: 99        100% NRBM, now removed after visit    < from: CT Angio Chest PE Protocol w/ IV Cont (17 @ 15:13) >  IMPRESSION: For contrast opacification of the pulmonary arteries. No   large or central pulmonary embolism.    < end of copied text >      < from: CT Head No Cont (17 @ 18:07) >  IMPRESSION:     No evidence of acute intracranial hemorrhage, midline shift or CT   evidence of acute territorial infarct.    If the patient's symptoms persist, consider short interval follow-up head   CT or brain MRIif there are no MRI contraindications.    < end of copied text >      RADIOLOGY & ADDITIONAL STUDIES:    < from: Xray Chest 1 View AP/PA. (17 @ 10:54) >  PROCEDURE DATE:  2017          INTERPRETATION:  Portable chest radiograph dated 2017.    COMPARISON: 2017.    CLINICAL INFORMATION: Pre VQ scan.    FINDINGS:    The airway is midline.  There are no airspace consolidations. Some pleural thickening at the   minor fissure is again noted.  There is no pleural effusion or pneumothorax.   The cardiac silhouette is normal size.   The bones are normal.     IMPRESSION:    No acute cardiopulmonary findings.    < end of copied text >
Patient is a 61y old  Male who presents with a chief complaint of weakness, multiple falls, SOB (30 Aug 2017 03:49)      HPI:  61M.  hx chronic LBP due to OA-has been on long acting  opioid  > 10 years. asthma, HTN, pulmonary HTN on home O2 3 L, GERALD, anxiety, spinal stenosis, herniated disks x2, arthritis, s/p hip replacement and spinal surgery presents to the ED c/o weakness. His son states he has fallen 4 times in past 24 hours because his left leg felt weak.  No syncope, LOC, did not hit head, no pain from fall.  Pt denies fever, HA, CP, abd pain.  He was previously admitted to the hospital for 5 days - 17 due to SOB and panic attack found to be in opiate withdrawal and had cardiac cath for persistent SOB which showed pulmonary HTN,EF65%, discharged 2 days ago . He states he feels very shaky and his left leg feels weak.  He takes morphine for arthritic pain.  Questionable whether extra doses were taken for pain.  As per son: pt is not acting at baseline.  Pt appears less alert, more confused, poorly able to describe his condition.  Pt poor historian, history obtained via pt and his son.   In ED utox positive for opiate and benzo which are his long term meds, serum ammonia 53  CXR- poor inspiratory film   EKG sinus rhythm with sinus arrhythmia nonspecific ST/T waves, , troponin x2 negative  Head CT- no acute pathology  Was given lactulose and morphine in ED    pt's status is reveiwed with his son at bedside and incraesed morbidity and mortality associated with his condition also rev in details in person.  pt is lethargic but arousable  intermittently tolerated BIPAP  data also rev with residents and Intensivist  critical pulm statuis with acute on chronic hypercapnic resp failure discuissed as well  increased risk of intubation / ventilatory support needed with tracheostomy if he can not tolertae BIPAP    Patient denies SOB,cough,fever,chills,CP,abdominal pain ,headache ,nausea,vomiting      moreconfused  critical care RN at bedside addressing withdrawal syndrome  tolerated BIPAP overnight  no cp      feels and looks much better  sitting OOB in cahir  more alert  no cp  asks to take less meds now    PMHx:  hypoxia respiratory failure-O2 dependence;  GERALD;  asthma;  HTN;  "chest congestion and leg swelling" w/o cardiac w/u (denies stress test, angiogram, hx of MI, stents or CABG);  chronic LBP due to severe OA-malfunctioning spinal cord stimulator.    PSHx:  AP;  b/l hip replacement;  lamenectomy.    Rx:  reveiwed.    SocHx:  lives in the community.   (lost his wife ~1 year ago).  no tobacco.  no current EtOH.    FHx:  no 1st degree relative w/ CAD. (29 Aug 2017 23:37)      PAST MEDICAL & SURGICAL HISTORY:  Neuritis  GERALD (obstructive sleep apnea)  Osteoarthritis  Chronic back pain greater than 3 months duration  Asthma  HTN (hypertension)  S/P hip replacement: bilateral  S/P spinal surgery  S/P appendectomy    < from: Cardiac Cath Lab - Adult (17 @ 08:35) >   Impression     Diagnostic Conclusions     Normal LV systolic function. Estimated LV ejection fraction is 65 %.   Normal coronary arteries.   Elevated left ventricular end-diastolic pressure.   Hemodynamic status is abnormal.   Severe pulmonary artery hypertension.    < end of copied text >    PREVIOUS DIAGNOSTIC TESTING:    MEDICATIONS  (STANDING):  aspirin enteric coated 81 milliGRAM(s) Oral daily  losartan 25 milliGRAM(s) Oral daily  docusate sodium 100 milliGRAM(s) Oral three times a day  montelukast 10 milliGRAM(s) Oral at bedtime  folic acid 1 milliGRAM(s) Oral daily  enoxaparin Injectable 40 milliGRAM(s) SubCutaneous every 24 hours  ALBUTerol/ipratropium for Nebulization 3 milliLiter(s) Nebulizer every 6 hours  buDESOnide   0.5 milliGRAM(s) Respule 0.5 milliGRAM(s) Inhalation every 12 hours  sodium chloride 0.9%. 1000 milliLiter(s) (75 mL/Hr) IV Continuous <Continuous>  lactulose Syrup 15 Gram(s) Oral two times a day  dexmedetomidine Infusion 0.4 MICROgram(s)/kG/Hr (13.5 mL/Hr) IV Continuous <Continuous>        MEDICATIONS  (PRN):  ALPRAZolam 0.25 milliGRAM(s) Oral two times a day PRN anxiety  ALBUTerol/ipratropium for Nebulization 3 milliLiter(s) Nebulizer every 4 hours PRN Shortness of Breath and/or Wheezing  morphine  - Injectable 2 milliGRAM(s) IV Push every 6 hours PRN Severe Pain (7 - 10)      FAMILY HISTORY:  No pertinent family history in first degree relatives      REVIEW OF SYSTEM:  Pertinent items are noted in HPI.  Constitutional negative for chills, fevers, sweats and weight loss  throat, and face:  negative for epistaxis, nasal congestion, sore throat and   tinnitus  Respiratory:pos for cough, dyspnea on exertion, pleuritic chest pain  and wheezing  Cardiovascular:  negative for chest pain, dyspnea and palpitations  Gastrointestinal: negative for abdominal pain, diarrhea, nausea and vomiting  Genitourinary: negative for dysuria, frequency and urinary incontinence  Hematologic/lymphatic: negative for bleeding and easy bruising  Musculoskeletal: negative for arthralgias, back pain and muscle weakness  Neurological:pos for dizziness, headaches, seizures and tremors    Vital Signs Last 24 Hrs  T(C): 36.4 (01 Sep 2017 04:41), Max: 38.1 (31 Aug 2017 15:35)  T(F): 97.6 (01 Sep 2017 04:41), Max: 100.6 (31 Aug 2017 15:35)  HR: 54 (01 Sep 2017 06:00) (42 - 101)  BP: 143/55 (01 Sep 2017 05:00) (125/57 - 164/90)  BP(mean): 78 (01 Sep 2017 05:00) (66 - 136)  RR: 27 (01 Sep 2017 05:00) (14 - 30)  SpO2: 97% (01 Sep 2017 06:00) (92% - 100%)  I&O's Summary    31 Aug 2017 07:01  -  01 Sep 2017 07:00  --------------------------------------------------------  IN: 612 mL / OUT: 0 mL / NET: 612 mL          PHYSICAL EXAM  General Appearance: obese, lethargic, opens eyes andf spekas after verbal stimuli, son at bedside  pt verbalizes being in the hospital and 2017 date   HEENT: PERRL, conjunctiva clear, Neck: Supple, , no adenopathy,  Lungs:decreased BS lower to mid lung fileds, mild exp wheeze, basilar crackles  Heart: Regular rate and rhythm, S1, S2 normal, no murmur, rub or gallop  Abdomen: Soft, non-tender, bowel sounds active , no hepatosplenomegaly  Extremities: chronic lower ext edema  Skin: Skin color, texture normal, no rashes   Neurologic: lethargic, moves all 4 exts    ECG:    LABS:    ABG - ( 01 Sep 2017 05:37 )  pH: 7.44  /  pCO2: 50    /  pO2: 159   / HCO3: 34    / Base Excess: 9     /  SaO2: 99                    ABG - ( 31 Aug 2017 01:42 )  pH: 7.50  /  pCO2: 44    /  pO2: 142   / HCO3: 34    / Base Excess: 10    /  SaO2: 99                                      8.9    6.9   )-----------( 143      ( 30 Aug 2017 05:29 )             29.4     0830    139  |  92<L>  |  16  ----------------------------<  88  3.3<L>   |  >45<HH>  |  0.49<L>    Ca    8.7      30 Aug 2017 05:29  Phos  2.3       Mg     2.1         TPro  6.3  /  Alb  2.8<L>  /  TBili  0.3  /  DBili  x   /  AST  16  /  ALT  45  /  AlkPhos  83  30    CARDIAC MARKERS ( 29 Aug 2017 23:56 )  <0.015 ng/mL / x     / 41 U/L / x     / x      CARDIAC MARKERS ( 29 Aug 2017 20:58 )  <0.015 ng/mL / x     / x     / x     / x      CARDIAC MARKERS ( 29 Aug 2017 17:30 )  <0.015 ng/mL / x     / 51 U/L / x     / x            Pro BNP  138  @ 17:30  D Dimer  --  @ 17:30    PT/INR - ( 29 Aug 2017 17:30 )   PT: 11.4 sec;   INR: 1.05 ratio         PTT - ( 29 Aug 2017 17:30 )  PTT:31.3 sec  Urinalysis Basic - ( 29 Aug 2017 20:06 )    Color: Yellow / Appearance: Clear / S.020 / pH: x  Gluc: x / Ketone: Small  / Bili: Negative / Urobili: 1 mg/dL   Blood: x / Protein: 30 mg/dL / Nitrite: Negative   Leuk Esterase: Trace / RBC: 3-5 /HPF / WBC 6-10   Sq Epi: x / Non Sq Epi: x / Bacteria: Few      ABG - ( 30 Aug 2017 05:55 )  pH: 7.25  /  pCO2: 106   /  pO2: 185   / HCO3: 45    / Base Excess: 15    /  SaO2: 99        100% NRBM, now removed after visit    < from: CT Angio Chest PE Protocol w/ IV Cont (17 @ 15:13) >  IMPRESSION: For contrast opacification of the pulmonary arteries. No   large or central pulmonary embolism.    < end of copied text >      < from: CT Head No Cont (17 @ 18:07) >  IMPRESSION:     No evidence of acute intracranial hemorrhage, midline shift or CT   evidence of acute territorial infarct.    If the patient's symptoms persist, consider short interval follow-up head   CT or brain MRIif there are no MRI contraindications.    < end of copied text >      RADIOLOGY & ADDITIONAL STUDIES:    < from: Xray Chest 1 View AP/PA. (17 @ 10:54) >  PROCEDURE DATE:  2017          INTERPRETATION:  Portable chest radiograph dated 2017.    COMPARISON: 2017.    CLINICAL INFORMATION: Pre VQ scan.    FINDINGS:    The airway is midline.  There are no airspace consolidations. Some pleural thickening at the   minor fissure is again noted.  There is no pleural effusion or pneumothorax.   The cardiac silhouette is normal size.   The bones are normal.     IMPRESSION:    No acute cardiopulmonary findings.    < end of copied text >
Patient is a 61y old  Male who presents with a chief complaint of weakness, multiple falls, SOB (30 Aug 2017 03:49)      HPI:  61M.  hx chronic LBP due to OA-has been on long acting  opioid  > 10 years. asthma, HTN, pulmonary HTN on home O2 3 L, GERALD, anxiety, spinal stenosis, herniated disks x2, arthritis, s/p hip replacement and spinal surgery presents to the ED c/o weakness. His son states he has fallen 4 times in past 24 hours because his left leg felt weak.  No syncope, LOC, did not hit head, no pain from fall.  Pt denies fever, HA, CP, abd pain.  He was previously admitted to the hospital for 5 days - 17 due to SOB and panic attack found to be in opiate withdrawal and had cardiac cath for persistent SOB which showed pulmonary HTN,EF65%, discharged 2 days ago . He states he feels very shaky and his left leg feels weak.  He takes morphine for arthritic pain.  Questionable whether extra doses were taken for pain.  As per son: pt is not acting at baseline.  Pt appears less alert, more confused, poorly able to describe his condition.  Pt poor historian, history obtained via pt and his son.   In ED utox positive for opiate and benzo which are his long term meds, serum ammonia 53  CXR- poor inspiratory film   EKG sinus rhythm with sinus arrhythmia nonspecific ST/T waves, , troponin x2 negative  Head CT- no acute pathology  Was given lactulose and morphine in ED    pt's status is reveiwed with his son at bedside and incraesed morbidity and mortality associated with his condition also rev in details in person.  pt is lethargic but arousable  intermittently tolerated BIPAP  data also rev with residents and Intensivist  critical pulm status with acute on chronic hypercapnic resp failure discuissed as well  increased risk of intubation / ventilatory support needed with tracheostomy if he can not tolertae BIPAP    Patient denies SOB,cough,fever,chills,CP,abdominal pain ,headache ,nausea,vomiting      more confused  critical care RN at bedside addressing withdrawal syndrome  tolerated BIPAP overnight  no cp      feels and looks much better  sitting OOB in chair  more alert  no cp  asks to take less meds now        PMHx:  hypoxia respiratory failure-O2 dependence;  GERALD;  asthma;  HTN;  "chest congestion and leg swelling" w/o cardiac w/u (denies stress test, angiogram, hx of MI, stents or CABG);  chronic LBP due to severe OA-malfunctioning spinal cord stimulator./  awake and alert  feels better with his breathing  asking for pain meds   adat rev  no cp  no reported difficulty breathing at all    PSHx:  AP;  b/l hip replacement;  lamenectomy.    Rx:  reveiwed.    SocHx:  lives in the community.   (lost his wife ~1 year ago).  no tobacco.  no current EtOH.    FHx:  no 1st degree relative w/ CAD. (29 Aug 2017 23:37)      PAST MEDICAL & SURGICAL HISTORY:  Neuritis  GERALD (obstructive sleep apnea)  Osteoarthritis  Chronic back pain greater than 3 months duration  Asthma  HTN (hypertension)  S/P hip replacement: bilateral  S/P spinal surgery  S/P appendectomy    < from: Cardiac Cath Lab - Adult (17 @ 08:35) >   Impression     Diagnostic Conclusions     Normal LV systolic function. Estimated LV ejection fraction is 65 %.   Normal coronary arteries.   Elevated left ventricular end-diastolic pressure.   Hemodynamic status is abnormal.   Severe pulmonary artery hypertension.    < end of copied text >    PREVIOUS DIAGNOSTIC TESTING:    MEDICATIONS  (STANDING):  aspirin enteric coated 81 milliGRAM(s) Oral daily  losartan 25 milliGRAM(s) Oral daily  docusate sodium 100 milliGRAM(s) Oral three times a day  montelukast 10 milliGRAM(s) Oral at bedtime  folic acid 1 milliGRAM(s) Oral daily  enoxaparin Injectable 40 milliGRAM(s) SubCutaneous every 24 hours  ALBUTerol/ipratropium for Nebulization 3 milliLiter(s) Nebulizer every 6 hours  buDESOnide   0.5 milliGRAM(s) Respule 0.5 milliGRAM(s) Inhalation every 12 hours  sodium chloride 0.9%. 1000 milliLiter(s) (75 mL/Hr) IV Continuous <Continuous>  lactulose Syrup 15 Gram(s) Oral two times a day  dexmedetomidine Infusion 0.4 MICROgram(s)/kG/Hr (13.5 mL/Hr) IV Continuous <Continuous>        MEDICATIONS  (PRN):  ALPRAZolam 0.25 milliGRAM(s) Oral two times a day PRN anxiety  ALBUTerol/ipratropium for Nebulization 3 milliLiter(s) Nebulizer every 4 hours PRN Shortness of Breath and/or Wheezing  morphine  - Injectable 2 milliGRAM(s) IV Push every 6 hours PRN Severe Pain (7 - 10)      FAMILY HISTORY:  No pertinent family history in first degree relatives      REVIEW OF SYSTEM:  Pertinent items are noted in HPI.  Constitutional negative for chills, fevers, sweats and weight loss  throat, and face:  negative for epistaxis, nasal congestion, sore throat and   tinnitus  Respiratory:pos for cough, dyspnea on exertion, pleuritic chest pain  and wheezing  Cardiovascular:  negative for chest pain, dyspnea and palpitations  Gastrointestinal: negative for abdominal pain, diarrhea, nausea and vomiting  Genitourinary: negative for dysuria, frequency and urinary incontinence  Hematologic/lymphatic: negative for bleeding and easy bruising  Musculoskeletal: negative for arthralgias, back pain and muscle weakness  Neurological:pos for dizziness, headaches, seizures and tremors    Vital Signs Last 24 Hrs  T(C): 36.9 (02 Sep 2017 08:00), Max: 36.9 (02 Sep 2017 08:00)  T(F): 98.4 (02 Sep 2017 08:00), Max: 98.4 (02 Sep 2017 08:00)  HR: 68 (02 Sep 2017 08:00) (47 - 91)  BP: 153/67 (02 Sep 2017 08:00) (118/- - 155/78)  BP(mean): 85 (02 Sep 2017 04:00) (73 - 105)  RR: 22 (02 Sep 2017 08:00) (15 - 33)  SpO2: 98% (02 Sep 2017 08:00) (77% - 100%)  I&O's Summary    31 Aug 2017 07:01  -  01 Sep 2017 07:00  --------------------------------------------------------  IN: 612 mL / OUT: 0 mL / NET: 612 mL          PHYSICAL EXAM  General Appearance: obese, awake and alert  HEENT: PERRL, conjunctiva clear, Neck: Supple, , no adenopathy,  Lungs:decreased BS lower to mid lung fileds, improved aie entry bilateraly, decreased mild exp wheeze,improved basilar crackles  Heart: Regular rate and rhythm, S1, S2 normal, no murmur, rub or gallop  Abdomen: Soft, non-tender, bowel sounds active , no hepatosplenomegaly  Extremities: chronic lower ext edema  Skin: Skin color, texture normal, no rashes   Neurologic: lethargic, moves all 4 exts    ECG:    LABS:    ABG - ( 02 Sep 2017 06:54 )  pH: 7.44  /  pCO2: 48    /  pO2: 89    / HCO3: 32    / Base Excess: 8     /  SaO2: 97                  ABG - ( 01 Sep 2017 05:37 )  pH: 7.44  /  pCO2: 50    /  pO2: 159   / HCO3: 34    / Base Excess: 9     /  SaO2: 99                    ABG - ( 31 Aug 2017 01:42 )  pH: 7.50  /  pCO2: 44    /  pO2: 142   / HCO3: 34    / Base Excess: 10    /  SaO2: 99                                      8.9    6.9   )-----------( 143      ( 30 Aug 2017 05:29 )             29.4     08-30    139  |  92<L>  |  16  ----------------------------<  88  3.3<L>   |  >45<HH>  |  0.49<L>    Ca    8.7      30 Aug 2017 05:29  Phos  2.3       Mg     2.1         TPro  6.3  /  Alb  2.8<L>  /  TBili  0.3  /  DBili  x   /  AST  16  /  ALT  45  /  AlkPhos  83  0830    CARDIAC MARKERS ( 29 Aug 2017 23:56 )  <0.015 ng/mL / x     / 41 U/L / x     / x      CARDIAC MARKERS ( 29 Aug 2017 20:58 )  <0.015 ng/mL / x     / x     / x     / x      CARDIAC MARKERS ( 29 Aug 2017 17:30 )  <0.015 ng/mL / x     / 51 U/L / x     / x            Pro BNP  138  @ 17:30  D Dimer  --  @ 17:30    PT/INR - ( 29 Aug 2017 17:30 )   PT: 11.4 sec;   INR: 1.05 ratio         PTT - ( 29 Aug 2017 17:30 )  PTT:31.3 sec  Urinalysis Basic - ( 29 Aug 2017 20:06 )    Color: Yellow / Appearance: Clear / S.020 / pH: x  Gluc: x / Ketone: Small  / Bili: Negative / Urobili: 1 mg/dL   Blood: x / Protein: 30 mg/dL / Nitrite: Negative   Leuk Esterase: Trace / RBC: 3-5 /HPF / WBC 6-10   Sq Epi: x / Non Sq Epi: x / Bacteria: Few      ABG - ( 30 Aug 2017 05:55 )  pH: 7.25  /  pCO2: 106   /  pO2: 185   / HCO3: 45    / Base Excess: 15    /  SaO2: 99        100% NRBM, now removed after visit    < from: CT Angio Chest PE Protocol w/ IV Cont (17 @ 15:13) >  IMPRESSION: For contrast opacification of the pulmonary arteries. No   large or central pulmonary embolism.    < end of copied text >      < from: CT Head No Cont (17 @ 18:07) >  IMPRESSION:     No evidence of acute intracranial hemorrhage, midline shift or CT   evidence of acute territorial infarct.    If the patient's symptoms persist, consider short interval follow-up head   CT or brain MRIif there are no MRI contraindications.    < end of copied text >      RADIOLOGY & ADDITIONAL STUDIES:    < from: Xray Chest 1 View AP/PA. (17 @ 10:54) >  PROCEDURE DATE:  2017          INTERPRETATION:  Portable chest radiograph dated 2017.    COMPARISON: 2017.    CLINICAL INFORMATION: Pre VQ scan.    FINDINGS:    The airway is midline.  There are no airspace consolidations. Some pleural thickening at the   minor fissure is again noted.  There is no pleural effusion or pneumothorax.   The cardiac silhouette is normal size.   The bones are normal.     IMPRESSION:    No acute cardiopulmonary findings.    < end of copied text >
Patient is a 61y old  Male who presents with a chief complaint of weakness, multiple falls, SOB (30 Aug 2017 03:49)      HPI:  61M.  hx chronic LBP due to OA-has been on long acting  opioid  > 10 years. asthma, HTN, pulmonary HTN on home O2 3 L, GERALD, anxiety, spinal stenosis, herniated disks x2, arthritis, s/p hip replacement and spinal surgery presents to the ED c/o weakness. His son states he has fallen 4 times in past 24 hours because his left leg felt weak.  No syncope, LOC, did not hit head, no pain from fall.  Pt denies fever, HA, CP, abd pain.  He was previously admitted to the hospital for 5 days - 17 due to SOB and panic attack found to be in opiate withdrawal and had cardiac cath for persistent SOB which showed pulmonary HTN,EF65%, discharged 2 days ago . He states he feels very shaky and his left leg feels weak.  He takes morphine for arthritic pain.  Questionable whether extra doses were taken for pain.  As per son: pt is not acting at baseline.  Pt appears less alert, more confused, poorly able to describe his condition.  Pt poor historian, history obtained via pt and his son.   In ED utox positive for opiate and benzo which are his long term meds, serum ammonia 53  CXR- poor inspiratory film   EKG sinus rhythm with sinus arrhythmia nonspecific ST/T waves, , troponin x2 negative  Head CT- no acute pathology  Was given lactulose and morphine in ED    pt's status is reveiwed with his son at bedside and incraesed morbidity and mortality associated with his condition also rev in details in person.  pt is lethargic but arousable  intermittently tolerated BIPAP  data also rev with residents and Intensivist  critical pulm status with acute on chronic hypercapnic resp failure discuissed as well  increased risk of intubation / ventilatory support needed with tracheostomy if he can not tolertae BIPAP    Patient denies SOB,cough,fever,chills,CP,abdominal pain ,headache ,nausea,vomiting      more confused  critical care RN at bedside addressing withdrawal syndrome  tolerated BIPAP overnight  no cp    -  feels and looks much better  sitting OOB in chair  more alert  no cp  asks to take less meds now      needs trilogy with worsened hypercapnic state and inadequate response to BIPAP  last ABG 7.36 / 56 / 92 / 31 / 97%  Due to pt's progressive dz and chronic hypercapnic resp failure with COPD and OHS, there is indication for nocturnal ventilation with Trilogy. BIPAP was tried and failed due ti ineffective rx. Without NIV, his condition can deteriorate and end up back in the hsopital; and can die of resp failure. this is all discussed with him with face to face contact done. he agrees to proceed.      PMHx:  hypoxia respiratory failure-O2 dependence;  GERALD;  asthma;  HTN;  "chest congestion and leg swelling" w/o cardiac w/u (denies stress test, angiogram, hx of MI, stents or CABG);  chronic LBP due to severe OA-malfunctioning spinal cord stimulator./  awake and alert  feels better with his breathing  asking for pain meds   adat rev  no cp  no reported difficulty breathing at all    PSHx:  AP;  b/l hip replacement;  lamenectomy.    Rx:  reveiwed.    SocHx:  lives in the community.   (lost his wife ~1 year ago).  no tobacco.  no current EtOH.    FHx:  no 1st degree relative w/ CAD. (29 Aug 2017 23:37)      PAST MEDICAL & SURGICAL HISTORY:  Neuritis  GERALD (obstructive sleep apnea)  Osteoarthritis  Chronic back pain greater than 3 months duration  Asthma  HTN (hypertension)  S/P hip replacement: bilateral  S/P spinal surgery  S/P appendectomy    < from: Cardiac Cath Lab - Adult (17 @ 08:35) >   Impression     Diagnostic Conclusions     Normal LV systolic function. Estimated LV ejection fraction is 65 %.   Normal coronary arteries.   Elevated left ventricular end-diastolic pressure.   Hemodynamic status is abnormal.   Severe pulmonary artery hypertension.    < end of copied text >    PREVIOUS DIAGNOSTIC TESTING:    MEDICATIONS  (STANDING):  aspirin enteric coated 81 milliGRAM(s) Oral daily  losartan 25 milliGRAM(s) Oral daily  docusate sodium 100 milliGRAM(s) Oral three times a day  montelukast 10 milliGRAM(s) Oral at bedtime  folic acid 1 milliGRAM(s) Oral daily  enoxaparin Injectable 40 milliGRAM(s) SubCutaneous every 24 hours  ALBUTerol/ipratropium for Nebulization 3 milliLiter(s) Nebulizer every 6 hours  buDESOnide   0.5 milliGRAM(s) Respule 0.5 milliGRAM(s) Inhalation every 12 hours  sodium chloride 0.9%. 1000 milliLiter(s) (75 mL/Hr) IV Continuous <Continuous>  lactulose Syrup 15 Gram(s) Oral two times a day  dexmedetomidine Infusion 0.4 MICROgram(s)/kG/Hr (13.5 mL/Hr) IV Continuous <Continuous>        MEDICATIONS  (PRN):  ALPRAZolam 0.25 milliGRAM(s) Oral two times a day PRN anxiety  ALBUTerol/ipratropium for Nebulization 3 milliLiter(s) Nebulizer every 4 hours PRN Shortness of Breath and/or Wheezing  morphine  - Injectable 2 milliGRAM(s) IV Push every 6 hours PRN Severe Pain (7 - 10)      FAMILY HISTORY:  No pertinent family history in first degree relatives      REVIEW OF SYSTEM:  Pertinent items are noted in HPI.  Constitutional negative for chills, fevers, sweats and weight loss  throat, and face:  negative for epistaxis, nasal congestion, sore throat and   tinnitus  Respiratory:pos for cough, dyspnea on exertion, pleuritic chest pain  and wheezing  Cardiovascular:  negative for chest pain, dyspnea and palpitations  Gastrointestinal: negative for abdominal pain, diarrhea, nausea and vomiting  Genitourinary: negative for dysuria, frequency and urinary incontinence  Hematologic/lymphatic: negative for bleeding and easy bruising  Musculoskeletal: negative for arthralgias, back pain and muscle weakness  Neurological:pos for dizziness, headaches, seizures and tremors    Vital Signs Last 24 Hrs  T(C): 36.9 (02 Sep 2017 08:00), Max: 36.9 (02 Sep 2017 08:00)  T(F): 98.4 (02 Sep 2017 08:00), Max: 98.4 (02 Sep 2017 08:00)  HR: 68 (02 Sep 2017 08:00) (47 - 91)  BP: 153/67 (02 Sep 2017 08:00) (118/- - 155/78)  BP(mean): 85 (02 Sep 2017 04:00) (73 - 105)  RR: 22 (02 Sep 2017 08:00) (15 - 33)  SpO2: 98% (02 Sep 2017 08:00) (77% - 100%)  I&O's Summary    31 Aug 2017 07:01  -  01 Sep 2017 07:00  --------------------------------------------------------  IN: 612 mL / OUT: 0 mL / NET: 612 mL          PHYSICAL EXAM  General Appearance: obese, awake and alert  HEENT: PERRL, conjunctiva clear, Neck: Supple, , no adenopathy,  Lungs:decreased BS lower to mid lung fileds, improved aie entry bilateraly, decreased mild exp wheeze,improved basilar crackles  Heart: Regular rate and rhythm, S1, S2 normal, no murmur, rub or gallop  Abdomen: Soft, non-tender, bowel sounds active , no hepatosplenomegaly  Extremities: chronic lower ext edema  Skin: Skin color, texture normal, no rashes   Neurologic: lethargic, moves all 4 exts    ECG:    LABS:    ABG - ( 02 Sep 2017 06:54 )  pH: 7.44  /  pCO2: 48    /  pO2: 89    / HCO3: 32    / Base Excess: 8     /  SaO2: 97                  ABG - ( 01 Sep 2017 05:37 )  pH: 7.44  /  pCO2: 50    /  pO2: 159   / HCO3: 34    / Base Excess: 9     /  SaO2: 99                    ABG - ( 31 Aug 2017 01:42 )  pH: 7.50  /  pCO2: 44    /  pO2: 142   / HCO3: 34    / Base Excess: 10    /  SaO2: 99                                      8.9    6.9   )-----------( 143      ( 30 Aug 2017 05:29 )             29.4     08-30    139  |  92<L>  |  16  ----------------------------<  88  3.3<L>   |  >45<HH>  |  0.49<L>    Ca    8.7      30 Aug 2017 05:29  Phos  2.3     08-30  Mg     2.1     08-30    TPro  6.3  /  Alb  2.8<L>  /  TBili  0.3  /  DBili  x   /  AST  16  /  ALT  45  /  AlkPhos  83  08-30    CARDIAC MARKERS ( 29 Aug 2017 23:56 )  <0.015 ng/mL / x     / 41 U/L / x     / x      CARDIAC MARKERS ( 29 Aug 2017 20:58 )  <0.015 ng/mL / x     / x     / x     / x      CARDIAC MARKERS ( 29 Aug 2017 17:30 )  <0.015 ng/mL / x     / 51 U/L / x     / x            Pro BNP  138  @ 17:30  D Dimer  --  @ 17:30    PT/INR - ( 29 Aug 2017 17:30 )   PT: 11.4 sec;   INR: 1.05 ratio         PTT - ( 29 Aug 2017 17:30 )  PTT:31.3 sec  Urinalysis Basic - ( 29 Aug 2017 20:06 )    Color: Yellow / Appearance: Clear / S.020 / pH: x  Gluc: x / Ketone: Small  / Bili: Negative / Urobili: 1 mg/dL   Blood: x / Protein: 30 mg/dL / Nitrite: Negative   Leuk Esterase: Trace / RBC: 3-5 /HPF / WBC 6-10   Sq Epi: x / Non Sq Epi: x / Bacteria: Few      ABG - ( 30 Aug 2017 05:55 )  pH: 7.25  /  pCO2: 106   /  pO2: 185   / HCO3: 45    / Base Excess: 15    /  SaO2: 99        100% NRBM, now removed after visit    < from: CT Angio Chest PE Protocol w/ IV Cont (17 @ 15:13) >  IMPRESSION: For contrast opacification of the pulmonary arteries. No   large or central pulmonary embolism.    < end of copied text >      < from: CT Head No Cont (17 @ 18:07) >  IMPRESSION:     No evidence of acute intracranial hemorrhage, midline shift or CT   evidence of acute territorial infarct.    If the patient's symptoms persist, consider short interval follow-up head   CT or brain MRIif there are no MRI contraindications.    < end of copied text >      RADIOLOGY & ADDITIONAL STUDIES:    < from: Xray Chest 1 View AP/PA. (17 @ 10:54) >  PROCEDURE DATE:  2017          INTERPRETATION:  Portable chest radiograph dated 2017.    COMPARISON: 2017.    CLINICAL INFORMATION: Pre VQ scan.    FINDINGS:    The airway is midline.  There are no airspace consolidations. Some pleural thickening at the   minor fissure is again noted.  There is no pleural effusion or pneumothorax.   The cardiac silhouette is normal size.   The bones are normal.     IMPRESSION:    No acute cardiopulmonary findings.    < end of copied text >
Patient is a 61y old  Male who presents with a chief complaint of weakness, multiple falls, SOB (30 Aug 2017 03:49)      HPI:  61M.  hx chronic LBP due to OA-has been on long acting  opioid  > 10 years. asthma, HTN, pulmonary HTN on home O2 3 L, GERALD, anxiety, spinal stenosis, herniated disks x2, arthritis, s/p hip replacement and spinal surgery presents to the ED c/o weakness. His son states he has fallen 4 times in past 24 hours because his left leg felt weak.  No syncope, LOC, did not hit head, no pain from fall.  Pt denies fever, HA, CP, abd pain.  He was previously admitted to the hospital for 5 days - 17 due to SOB and panic attack found to be in opiate withdrawal and had cardiac cath for persistent SOB which showed pulmonary HTN,EF65%, discharged 2 days ago . He states he feels very shaky and his left leg feels weak.  He takes morphine for arthritic pain.  Questionable whether extra doses were taken for pain.  As per son: pt is not acting at baseline.  Pt appears less alert, more confused, poorly able to describe his condition.  Pt poor historian, history obtained via pt and his son.   In ED utox positive for opiate and benzo which are his long term meds, serum ammonia 53  CXR- poor inspiratory film   EKG sinus rhythm with sinus arrhythmia nonspecific ST/T waves, , troponin x2 negative  Head CT- no acute pathology  Was given lactulose and morphine in ED    pt's status is reveiwed with his son at bedside and incraesed morbidity and mortality associated with his condition also rev in details in person.  pt is lethargic but arousable  intermittently tolerated BIPAP  data also rev with residents and Intensivist  critical pulm status with acute on chronic hypercapnic resp failure discuissed as well  increased risk of intubation / ventilatory support needed with tracheostomy if he can not tolertae BIPAP    Patient denies SOB,cough,fever,chills,CP,abdominal pain ,headache ,nausea,vomiting      more confused  critical care RN at bedside addressing withdrawal syndrome  tolerated BIPAP overnight  no cp    -  feels and looks much better  sitting OOB in chair  more alert  no cp  asks to take less meds now      needs trilogy with worsened hypercapnic state and inadequate response to BIPAP  last ABG 7.36 / 56 / 92 / 31 / 97%  Due to pt's progressive dz and chronic hypercapnic resp failure with COPD and OHS, there is indication for nocturnal ventilation with Trilogy. BIPAP was tried and failed due ti ineffective rx. Without NIV, his condition can deteriorate and end up back in the hsopital; and can die of resp failure. this is all discussed with him with face to face contact done. he agrees to proceed.      discussed with  team  waiting for his trilogy machine medically necessary before discharge home      plan for DC with trilogy  at home for use  feels great  very thankful; for care he received at this hospital  PMHx:  hypoxia respiratory failure-O2 dependence;  GERALD;  asthma;  HTN;  "chest congestion and leg swelling" w/o cardiac w/u (denies stress test, angiogram, hx of MI, stents or CABG);  chronic LBP due to severe OA-malfunctioning spinal cord stimulator.  awake and alert  feels better with his breathing  asking for pain meds   adat rev  no cp  no reported difficulty breathing at all    PSHx:  AP;  b/l hip replacement;  lamenectomy.    Rx:  reveiwed.    SocHx:  lives in the community.   (lost his wife ~1 year ago).  no tobacco.  no current EtOH.    FHx:  no 1st degree relative w/ CAD. (29 Aug 2017 23:37)      PAST MEDICAL & SURGICAL HISTORY:  Neuritis  GERALD (obstructive sleep apnea)  Osteoarthritis  Chronic back pain greater than 3 months duration  Asthma  HTN (hypertension)  S/P hip replacement: bilateral  S/P spinal surgery  S/P appendectomy    < from: Cardiac Cath Lab - Adult (17 @ 08:35) >   Impression     Diagnostic Conclusions     Normal LV systolic function. Estimated LV ejection fraction is 65 %.   Normal coronary arteries.   Elevated left ventricular end-diastolic pressure.   Hemodynamic status is abnormal.   Severe pulmonary artery hypertension.    < end of copied text >    PREVIOUS DIAGNOSTIC TESTING:    MEDICATIONS  (STANDING):  aspirin enteric coated 81 milliGRAM(s) Oral daily  losartan 25 milliGRAM(s) Oral daily  docusate sodium 100 milliGRAM(s) Oral three times a day  montelukast 10 milliGRAM(s) Oral at bedtime  folic acid 1 milliGRAM(s) Oral daily  enoxaparin Injectable 40 milliGRAM(s) SubCutaneous every 24 hours  ALBUTerol/ipratropium for Nebulization 3 milliLiter(s) Nebulizer every 6 hours  buDESOnide   0.5 milliGRAM(s) Respule 0.5 milliGRAM(s) Inhalation every 12 hours  sodium chloride 0.9%. 1000 milliLiter(s) (75 mL/Hr) IV Continuous <Continuous>  lactulose Syrup 15 Gram(s) Oral two times a day  dexmedetomidine Infusion 0.4 MICROgram(s)/kG/Hr (13.5 mL/Hr) IV Continuous <Continuous>        MEDICATIONS  (PRN):  ALPRAZolam 0.25 milliGRAM(s) Oral two times a day PRN anxiety  ALBUTerol/ipratropium for Nebulization 3 milliLiter(s) Nebulizer every 4 hours PRN Shortness of Breath and/or Wheezing  morphine  - Injectable 2 milliGRAM(s) IV Push every 6 hours PRN Severe Pain (7 - 10)      FAMILY HISTORY:  No pertinent family history in first degree relatives      REVIEW OF SYSTEM:  Pertinent items are noted in HPI.  Constitutional negative for chills, fevers, sweats and weight loss  throat, and face:  negative for epistaxis, nasal congestion, sore throat and   tinnitus  Respiratory:pos for cough, dyspnea on exertion, pleuritic chest pain  and wheezing  Cardiovascular:  negative for chest pain, dyspnea and palpitations  Gastrointestinal: negative for abdominal pain, diarrhea, nausea and vomiting  Genitourinary: negative for dysuria, frequency and urinary incontinence  Hematologic/lymphatic: negative for bleeding and easy bruising  Musculoskeletal: negative for arthralgias, back pain and muscle weakness  Neurological:pos for dizziness, headaches, seizures and tremors    Vital Signs Last 24 Hrs  T(C): 36.9 (02 Sep 2017 08:00), Max: 36.9 (02 Sep 2017 08:00)  T(F): 98.4 (02 Sep 2017 08:00), Max: 98.4 (02 Sep 2017 08:00)  HR: 68 (02 Sep 2017 08:00) (47 - 91)  BP: 153/67 (02 Sep 2017 08:00) (118/- - 155/78)  BP(mean): 85 (02 Sep 2017 04:00) (73 - 105)  RR: 22 (02 Sep 2017 08:00) (15 - 33)  SpO2: 98% (02 Sep 2017 08:00) (77% - 100%)  I&O's Summary    31 Aug 2017 07:01  -  01 Sep 2017 07:00  --------------------------------------------------------  IN: 612 mL / OUT: 0 mL / NET: 612 mL          PHYSICAL EXAM  General Appearance: obese, awake and alert  HEENT: PERRL, conjunctiva clear, Neck: Supple, , no adenopathy,  Lungs:decreased BS lower to mid lung fileds, improved aie entry bilateraly, decreased mild exp wheeze,improved basilar crackles  Heart: Regular rate and rhythm, S1, S2 normal, no murmur, rub or gallop  Abdomen: Soft, non-tender, bowel sounds active , no hepatosplenomegaly  Extremities: chronic lower ext edema  Skin: Skin color, texture normal, no rashes   Neurologic: lethargic, moves all 4 exts    ECG:    LABS:    ABG - ( 02 Sep 2017 06:54 )  pH: 7.44  /  pCO2: 48    /  pO2: 89    / HCO3: 32    / Base Excess: 8     /  SaO2: 97                  ABG - ( 01 Sep 2017 05:37 )  pH: 7.44  /  pCO2: 50    /  pO2: 159   / HCO3: 34    / Base Excess: 9     /  SaO2: 99                    ABG - ( 31 Aug 2017 01:42 )  pH: 7.50  /  pCO2: 44    /  pO2: 142   / HCO3: 34    / Base Excess: 10    /  SaO2: 99                                      8.9    6.9   )-----------( 143      ( 30 Aug 2017 05:29 )             29.4     08-30    139  |  92<L>  |  16  ----------------------------<  88  3.3<L>   |  >45<HH>  |  0.49<L>    Ca    8.7      30 Aug 2017 05:29  Phos  2.3     08-30  Mg     2.1     08-    TPro  6.3  /  Alb  2.8<L>  /  TBili  0.3  /  DBili  x   /  AST  16  /  ALT  45  /  AlkPhos  83  08-30    CARDIAC MARKERS ( 29 Aug 2017 23:56 )  <0.015 ng/mL / x     / 41 U/L / x     / x      CARDIAC MARKERS ( 29 Aug 2017 20:58 )  <0.015 ng/mL / x     / x     / x     / x      CARDIAC MARKERS ( 29 Aug 2017 17:30 )  <0.015 ng/mL / x     / 51 U/L / x     / x            Pro BNP  138  @ 17:30  D Dimer  --  @ 17:30    PT/INR - ( 29 Aug 2017 17:30 )   PT: 11.4 sec;   INR: 1.05 ratio         PTT - ( 29 Aug 2017 17:30 )  PTT:31.3 sec  Urinalysis Basic - ( 29 Aug 2017 20:06 )    Color: Yellow / Appearance: Clear / S.020 / pH: x  Gluc: x / Ketone: Small  / Bili: Negative / Urobili: 1 mg/dL   Blood: x / Protein: 30 mg/dL / Nitrite: Negative   Leuk Esterase: Trace / RBC: 3-5 /HPF / WBC 6-10   Sq Epi: x / Non Sq Epi: x / Bacteria: Few      ABG - ( 30 Aug 2017 05:55 )  pH: 7.25  /  pCO2: 106   /  pO2: 185   / HCO3: 45    / Base Excess: 15    /  SaO2: 99        100% NRBM, now removed after visit    < from: CT Angio Chest PE Protocol w/ IV Cont (17 @ 15:13) >  IMPRESSION: For contrast opacification of the pulmonary arteries. No   large or central pulmonary embolism.    < end of copied text >      < from: CT Head No Cont (17 @ 18:07) >  IMPRESSION:     No evidence of acute intracranial hemorrhage, midline shift or CT   evidence of acute territorial infarct.    If the patient's symptoms persist, consider short interval follow-up head   CT or brain MRIif there are no MRI contraindications.    < end of copied text >      RADIOLOGY & ADDITIONAL STUDIES:    < from: Xray Chest 1 View AP/PA. (17 @ 10:54) >  PROCEDURE DATE:  2017          INTERPRETATION:  Portable chest radiograph dated 2017.    COMPARISON: 2017.    CLINICAL INFORMATION: Pre VQ scan.    FINDINGS:    The airway is midline.  There are no airspace consolidations. Some pleural thickening at the   minor fissure is again noted.  There is no pleural effusion or pneumothorax.   The cardiac silhouette is normal size.   The bones are normal.     IMPRESSION:    No acute cardiopulmonary findings.    < end of copied text >
Patient is a 61y old  Male who presents with a chief complaint of weakness, multiple falls, SOB (30 Aug 2017 03:49)      HPI:  61M.  hx chronic LBP due to OA-has been on long acting  opioid  > 10 years. asthma, HTN, pulmonary HTN on home O2 3 L, GERALD, anxiety, spinal stenosis, herniated disks x2, arthritis, s/p hip replacement and spinal surgery presents to the ED c/o weakness. His son states he has fallen 4 times in past 24 hours because his left leg felt weak.  No syncope, LOC, did not hit head, no pain from fall.  Pt denies fever, HA, CP, abd pain.  He was previously admitted to the hospital for 5 days - 17 due to SOB and panic attack found to be in opiate withdrawal and had cardiac cath for persistent SOB which showed pulmonary HTN,EF65%, discharged 2 days ago . He states he feels very shaky and his left leg feels weak.  He takes morphine for arthritic pain.  Questionable whether extra doses were taken for pain.  As per son: pt is not acting at baseline.  Pt appears less alert, more confused, poorly able to describe his condition.  Pt poor historian, history obtained via pt and his son.   In ED utox positive for opiate and benzo which are his long term meds, serum ammonia 53  CXR- poor inspiratory film   EKG sinus rhythm with sinus arrhythmia nonspecific ST/T waves, , troponin x2 negative  Head CT- no acute pathology  Was given lactulose and morphine in ED    pt's status is reveiwed with his son at bedside and incraesed morbidity and mortality associated with his condition also rev in details in person.  pt is lethargic but arousable  intermittently tolerated BIPAP  data also rev with residents and Intensivist  critical pulm status with acute on chronic hypercapnic resp failure discuissed as well  increased risk of intubation / ventilatory support needed with tracheostomy if he can not tolertae BIPAP    Patient denies SOB,cough,fever,chills,CP,abdominal pain ,headache ,nausea,vomiting      more confused  critical care RN at bedside addressing withdrawal syndrome  tolerated BIPAP overnight  no cp    -  feels and looks much better  sitting OOB in chair  more alert  no cp  asks to take less meds now      needs trilogy with worsened hypercapnic state and inadequate response to BIPAP  last ABG 7.36 / 56 / 92 / 31 / 97%  Due to pt's progressive dz and chronic hypercapnic resp failure with COPD and OHS, there is indication for nocturnal ventilation with Trilogy. BIPAP was tried and failed due ti ineffective rx. Without NIV, his condition can deteriorate and end up back in the hsopital; and can die of resp failure. this is all discussed with him with face to face contact done. he agrees to proceed.      discussed with  team  waiting for his trilogy machine medically necessary before discharge home  PMHx:  hypoxia respiratory failure-O2 dependence;  GERALD;  asthma;  HTN;  "chest congestion and leg swelling" w/o cardiac w/u (denies stress test, angiogram, hx of MI, stents or CABG);  chronic LBP due to severe OA-malfunctioning spinal cord stimulator.  awake and alert  feels better with his breathing  asking for pain meds   adat rev  no cp  no reported difficulty breathing at all    PSHx:  AP;  b/l hip replacement;  lamenectomy.    Rx:  reveiwed.    SocHx:  lives in the community.   (lost his wife ~1 year ago).  no tobacco.  no current EtOH.    FHx:  no 1st degree relative w/ CAD. (29 Aug 2017 23:37)      PAST MEDICAL & SURGICAL HISTORY:  Neuritis  GERALD (obstructive sleep apnea)  Osteoarthritis  Chronic back pain greater than 3 months duration  Asthma  HTN (hypertension)  S/P hip replacement: bilateral  S/P spinal surgery  S/P appendectomy    < from: Cardiac Cath Lab - Adult (17 @ 08:35) >   Impression     Diagnostic Conclusions     Normal LV systolic function. Estimated LV ejection fraction is 65 %.   Normal coronary arteries.   Elevated left ventricular end-diastolic pressure.   Hemodynamic status is abnormal.   Severe pulmonary artery hypertension.    < end of copied text >    PREVIOUS DIAGNOSTIC TESTING:    MEDICATIONS  (STANDING):  aspirin enteric coated 81 milliGRAM(s) Oral daily  losartan 25 milliGRAM(s) Oral daily  docusate sodium 100 milliGRAM(s) Oral three times a day  montelukast 10 milliGRAM(s) Oral at bedtime  folic acid 1 milliGRAM(s) Oral daily  enoxaparin Injectable 40 milliGRAM(s) SubCutaneous every 24 hours  ALBUTerol/ipratropium for Nebulization 3 milliLiter(s) Nebulizer every 6 hours  buDESOnide   0.5 milliGRAM(s) Respule 0.5 milliGRAM(s) Inhalation every 12 hours  sodium chloride 0.9%. 1000 milliLiter(s) (75 mL/Hr) IV Continuous <Continuous>  lactulose Syrup 15 Gram(s) Oral two times a day  dexmedetomidine Infusion 0.4 MICROgram(s)/kG/Hr (13.5 mL/Hr) IV Continuous <Continuous>        MEDICATIONS  (PRN):  ALPRAZolam 0.25 milliGRAM(s) Oral two times a day PRN anxiety  ALBUTerol/ipratropium for Nebulization 3 milliLiter(s) Nebulizer every 4 hours PRN Shortness of Breath and/or Wheezing  morphine  - Injectable 2 milliGRAM(s) IV Push every 6 hours PRN Severe Pain (7 - 10)      FAMILY HISTORY:  No pertinent family history in first degree relatives      REVIEW OF SYSTEM:  Pertinent items are noted in HPI.  Constitutional negative for chills, fevers, sweats and weight loss  throat, and face:  negative for epistaxis, nasal congestion, sore throat and   tinnitus  Respiratory:pos for cough, dyspnea on exertion, pleuritic chest pain  and wheezing  Cardiovascular:  negative for chest pain, dyspnea and palpitations  Gastrointestinal: negative for abdominal pain, diarrhea, nausea and vomiting  Genitourinary: negative for dysuria, frequency and urinary incontinence  Hematologic/lymphatic: negative for bleeding and easy bruising  Musculoskeletal: negative for arthralgias, back pain and muscle weakness  Neurological:pos for dizziness, headaches, seizures and tremors    Vital Signs Last 24 Hrs  T(C): 36.9 (02 Sep 2017 08:00), Max: 36.9 (02 Sep 2017 08:00)  T(F): 98.4 (02 Sep 2017 08:00), Max: 98.4 (02 Sep 2017 08:00)  HR: 68 (02 Sep 2017 08:00) (47 - 91)  BP: 153/67 (02 Sep 2017 08:00) (118/- - 155/78)  BP(mean): 85 (02 Sep 2017 04:00) (73 - 105)  RR: 22 (02 Sep 2017 08:00) (15 - 33)  SpO2: 98% (02 Sep 2017 08:00) (77% - 100%)  I&O's Summary    31 Aug 2017 07:01  -  01 Sep 2017 07:00  --------------------------------------------------------  IN: 612 mL / OUT: 0 mL / NET: 612 mL          PHYSICAL EXAM  General Appearance: obese, awake and alert  HEENT: PERRL, conjunctiva clear, Neck: Supple, , no adenopathy,  Lungs:decreased BS lower to mid lung fileds, improved aie entry bilateraly, decreased mild exp wheeze,improved basilar crackles  Heart: Regular rate and rhythm, S1, S2 normal, no murmur, rub or gallop  Abdomen: Soft, non-tender, bowel sounds active , no hepatosplenomegaly  Extremities: chronic lower ext edema  Skin: Skin color, texture normal, no rashes   Neurologic: lethargic, moves all 4 exts    ECG:    LABS:    ABG - ( 02 Sep 2017 06:54 )  pH: 7.44  /  pCO2: 48    /  pO2: 89    / HCO3: 32    / Base Excess: 8     /  SaO2: 97                  ABG - ( 01 Sep 2017 05:37 )  pH: 7.44  /  pCO2: 50    /  pO2: 159   / HCO3: 34    / Base Excess: 9     /  SaO2: 99                    ABG - ( 31 Aug 2017 01:42 )  pH: 7.50  /  pCO2: 44    /  pO2: 142   / HCO3: 34    / Base Excess: 10    /  SaO2: 99                                      8.9    6.9   )-----------( 143      ( 30 Aug 2017 05:29 )             29.4     08-30    139  |  92<L>  |  16  ----------------------------<  88  3.3<L>   |  >45<HH>  |  0.49<L>    Ca    8.7      30 Aug 2017 05:29  Phos  2.3     08-30  Mg     2.1     08-    TPro  6.3  /  Alb  2.8<L>  /  TBili  0.3  /  DBili  x   /  AST  16  /  ALT  45  /  AlkPhos  83  08-30    CARDIAC MARKERS ( 29 Aug 2017 23:56 )  <0.015 ng/mL / x     / 41 U/L / x     / x      CARDIAC MARKERS ( 29 Aug 2017 20:58 )  <0.015 ng/mL / x     / x     / x     / x      CARDIAC MARKERS ( 29 Aug 2017 17:30 )  <0.015 ng/mL / x     / 51 U/L / x     / x            Pro BNP  138  @ 17:30  D Dimer  --  @ 17:30    PT/INR - ( 29 Aug 2017 17:30 )   PT: 11.4 sec;   INR: 1.05 ratio         PTT - ( 29 Aug 2017 17:30 )  PTT:31.3 sec  Urinalysis Basic - ( 29 Aug 2017 20:06 )    Color: Yellow / Appearance: Clear / S.020 / pH: x  Gluc: x / Ketone: Small  / Bili: Negative / Urobili: 1 mg/dL   Blood: x / Protein: 30 mg/dL / Nitrite: Negative   Leuk Esterase: Trace / RBC: 3-5 /HPF / WBC 6-10   Sq Epi: x / Non Sq Epi: x / Bacteria: Few      ABG - ( 30 Aug 2017 05:55 )  pH: 7.25  /  pCO2: 106   /  pO2: 185   / HCO3: 45    / Base Excess: 15    /  SaO2: 99        100% NRBM, now removed after visit    < from: CT Angio Chest PE Protocol w/ IV Cont (17 @ 15:13) >  IMPRESSION: For contrast opacification of the pulmonary arteries. No   large or central pulmonary embolism.    < end of copied text >      < from: CT Head No Cont (17 @ 18:07) >  IMPRESSION:     No evidence of acute intracranial hemorrhage, midline shift or CT   evidence of acute territorial infarct.    If the patient's symptoms persist, consider short interval follow-up head   CT or brain MRIif there are no MRI contraindications.    < end of copied text >      RADIOLOGY & ADDITIONAL STUDIES:    < from: Xray Chest 1 View AP/PA. (17 @ 10:54) >  PROCEDURE DATE:  2017          INTERPRETATION:  Portable chest radiograph dated 2017.    COMPARISON: 2017.    CLINICAL INFORMATION: Pre VQ scan.    FINDINGS:    The airway is midline.  There are no airspace consolidations. Some pleural thickening at the   minor fissure is again noted.  There is no pleural effusion or pneumothorax.   The cardiac silhouette is normal size.   The bones are normal.     IMPRESSION:    No acute cardiopulmonary findings.    < end of copied text >
evidence overnight was on Bipap, off this am ABG better, more comfortable    HPI:  61M.  hx chronic LBP due to OA-has been on long acting  opioid  > 10 years. asthma, HTN, pulmonary HTN on home O2 3 L, GERALD, anxiety, spinal stenosis, herniated disks x2, arthritis, s/p hip replacement and spinal surgery presented to the ED c/o weakness.  recent cardiac cath for persistent SOB which showed pulmonary HTN,EF65%, .   developed hypercarbic respiratory failure, hyperammonemia  now much improved  CXR- poor inspiratory film, no clear infiltrate   liver sono      PMHx:  hypoxia respiratory failure-O2 dependence;  GERALD;  asthma;  HTN;  "chest congestion and leg swelling" w/o cardiac w/u (denies stress test, angiogram, hx of MI, stents or CABG);  chronic LBP due to severe OA-malfunctioning spinal cord stimulator.    PSHx:  AP;  b/l hip replacement;  lamenectomy.    Rx:  reveiwed.    SocHx:  lives in the community.   (lost his wife ~1 year ago).  no tobacco.  no current EtOH.    FHx:  no 1st degree relative w/ CAD. (29 Aug 2017 23:37)           MEDICATIONS  (STANDING):  aspirin enteric coated 81 milliGRAM(s) Oral daily  losartan 25 milliGRAM(s) Oral daily  montelukast 10 milliGRAM(s) Oral at bedtime  folic acid 1 milliGRAM(s) Oral daily  enoxaparin Injectable 40 milliGRAM(s) SubCutaneous every 24 hours  ALBUTerol/ipratropium for Nebulization 3 milliLiter(s) Nebulizer every 6 hours  buDESOnide   0.5 milliGRAM(s) Respule 0.5 milliGRAM(s) Inhalation every 12 hours  lactulose Syrup 15 Gram(s) Oral daily    MEDICATIONS  (PRN):  ALPRAZolam 0.25 milliGRAM(s) Oral two times a day PRN anxiety  ALBUTerol/ipratropium for Nebulization 3 milliLiter(s) Nebulizer every 4 hours PRN Shortness of Breath and/or Wheezing  morphine  - Injectable 2 milliGRAM(s) IV Push every 6 hours PRN Severe Pain (7 - 10)  morphine   Solution 5 milliGRAM(s) Oral every 6 hours PRN Moderate Pain (4 - 6)              Neuro : awake alert, non focal appropirate  respiratory lungs clear sat 99% on 4 liters      ICU Vital Signs Last 24 Hrs  T(C): 36.8 (01 Sep 2017 09:00), Max: 36.8 (01 Sep 2017 09:00)  T(F): 98.2 (01 Sep 2017 09:00), Max: 98.2 (01 Sep 2017 09:00)  HR: 70 (02 Sep 2017 04:00) (47 - 91)  BP: 149/65 (02 Sep 2017 04:00) (118/- - 155/78)  BP(mean): 85 (02 Sep 2017 04:00) (73 - 105)  ABP: --  ABP(mean): --  RR: 33 (02 Sep 2017 04:00) (15 - 33)  SpO2: 100% (02 Sep 2017 03:00) (77% - 100%)      renal voiding    I&O's Summary    01 Sep 2017 07:01  -  02 Sep 2017 07:00  --------------------------------------------------------  IN: 0 mL / OUT: 600 mL / NET: -600 mL    extremities - minimal edema                               10.7   9.8   )-----------( 191      ( 02 Sep 2017 06:48 )             33.6       09-02    139  |  102  |  13  ----------------------------<  100<H>  3.2<L>   |  29  |  0.59    Ca    9.1      02 Sep 2017 06:48              ABG - ( 02 Sep 2017 06:54 )  pH: 7.44  /  pCO2: 48    /  pO2: 89    / HCO3: 32    / Base Excess: 8     /  SaO2: 97              DVT Prophylaxis:  lovenox                                                               Advanced Directives: Full code
overnight     sob, was delirius yesterday, better this am      HPI:  61M.  hx chronic LBP due to OA-has been on long acting  opioid  > 10 years. asthma, HTN, pulmonary HTN on home O2 3 L, GERALD, anxiety, spinal stenosis, herniated disks x2, arthritis, s/p hip replacement and spinal surgery presents to the ED c/o weakness. His son states he has fallen 4 times in past 24 hours because his left leg felt weak.  No syncope, LOC, did not hit head, no pain from fall.  Pt denies fever, HA, CP, abd pain.  He was previously admitted to the hospital for 5 days 8/22- 8/26/17 due to SOB and panic attack found to be in opiate withdrawal and had cardiac cath for persistent SOB which showed pulmonary HTN,EF65%, discharged 2 days ago . He states he feels very shaky and his left leg feels weak.  He takes morphine for arthritic pain.  Questionable whether extra doses were taken for pain.  As per son: pt is not acting at baseline.     CXR- poor inspiratory film, no clear infiltrate   EKG sinus rhythm with sinus arrhythmia nonspecific ST/T waves, , troponin x2 negative  Head CT- no acute pathology    more lucid this am, still on low dose precedex      PMHx:  hypoxia respiratory failure-O2 dependence;  GERALD;  asthma;  HTN;  "chest congestion and leg swelling" w/o cardiac w/u (denies stress test, angiogram, hx of MI, stents or CABG);  chronic LBP due to severe OA-malfunctioning spinal cord stimulator.       SocHx:  lives in the community.   (lost his wife ~1 year ago).  no tobacco.  no current EtOH.    FHx:  no 1st degree relative w/ CAD. (29 Aug 2017 23:37)         MEDICATIONS  (STANDING):  aspirin enteric coated 81 milliGRAM(s) Oral daily  losartan 25 milliGRAM(s) Oral daily  docusate sodium 100 milliGRAM(s) Oral three times a day  montelukast 10 milliGRAM(s) Oral at bedtime  folic acid 1 milliGRAM(s) Oral daily  enoxaparin Injectable 40 milliGRAM(s) SubCutaneous every 24 hours  ALBUTerol/ipratropium for Nebulization 3 milliLiter(s) Nebulizer every 6 hours  buDESOnide   0.5 milliGRAM(s) Respule 0.5 milliGRAM(s) Inhalation every 12 hours  lactulose Syrup 15 Gram(s) Oral two times a day  dexmedetomidine Infusion 0.4 MICROgram(s)/kG/Hr (13.5 mL/Hr) IV Continuous <Continuous>  potassium chloride    Tablet ER 40 milliEquivalent(s) Oral every 4 hours    MEDICATIONS  (PRN):  ALPRAZolam 0.25 milliGRAM(s) Oral two times a day PRN anxiety  ALBUTerol/ipratropium for Nebulization 3 milliLiter(s) Nebulizer every 4 hours PRN Shortness of Breath and/or Wheezing  morphine  - Injectable 2 milliGRAM(s) IV Push every 6 hours PRN Severe Pain (7 - 10)  morphine   Solution 5 milliGRAM(s) Oral every 6 hours PRN Moderate Pain (4 - 6)              neuro - awake and alert slgithly confused    respiratory - clear  cv rrr  abdomen soft      Weight (kg): 135 (08-31 @ 08:48)    ICU Vital Signs Last 24 Hrs  T(C): 36.4 (01 Sep 2017 04:41), Max: 38.1 (31 Aug 2017 15:35)  T(F): 97.6 (01 Sep 2017 04:41), Max: 100.6 (31 Aug 2017 15:35)  HR: 54 (01 Sep 2017 06:00) (42 - 101)  BP: 143/55 (01 Sep 2017 05:00) (125/57 - 164/90)  BP(mean): 78 (01 Sep 2017 05:00) (66 - 136)  ABP: --  ABP(mean): --  RR: 27 (01 Sep 2017 05:00) (14 - 30)  SpO2: 97% (01 Sep 2017 06:00) (92% - 100%)          I&O's Summary    31 Aug 2017 07:01  -  01 Sep 2017 07:00  --------------------------------------------------------  IN: 612 mL / OUT: 0 mL / NET: 612 mL                            10.1   8.2   )-----------( 158      ( 01 Sep 2017 04:56 )             31.7       09-01    143  |  103  |  16  ----------------------------<  111<H>  3.2<L>   |  35<H>  |  0.55    Ca    9.3      01 Sep 2017 04:56      ABG - ( 01 Sep 2017 05:37 )  pH: 7.44  /  pCO2: 50    /  pO2: 159   / HCO3: 34    / Base Excess: 9     /  SaO2: 99                      DVT Prophylaxis:  heparin subq                                                               Advanced Directives: Full code
61M w/ PMH of chronic LBP due to OA, on long-acting opioid >10 years, asthma, HTN, pulmonary HTN on home O2 3 L, GERALD, anxiety, spinal stenosis, herniated disks x2, arthritis, s/p hip replacement and spinal surgery presents to the ED c/o weakness. His son states he has fallen 4 times in past 24 hours because his left leg felt weak.  No syncope, LOC, did not hit head, no pain from fall.  Pt denies fever, HA, CP, abd pain.  He was previously admitted to the hospital for 5 days 8/22- 8/26/17 due to SOB and panic attack found to be in opiate withdrawal and had cardiac cath for persistent SOB which showed pulmonary HTN,EF65%, discharged 2 days ago . He states he feels very shaky and his left leg feels weak.  He takes morphine for arthritic pain.  Questionable whether extra doses were taken for pain.  As per son: pt is not acting at baseline.  Pt appears less alert, more confused, poorly able to describe his condition.  Pt poor historian, history obtained via pt and his son.   In ED utox positive for opiate and benzo which are his long term meds, serum ammonia 53  CXR- poor inspiratory film   EKG sinus rhythm with sinus arrhythmia nonspecific ST/T waves, , troponin x2 negative  Head CT- no acute pathology  Was given lactulose and morphine in ED    9/1: Patient seen at the bedside. Appears more comfortable. Discussed with patient that we will resume care if intensive care signs off. Hemodynamically stable off BIPAP when seen at the bedside this morning.     9/2: Patient seen and examined at the bedside. Reports his breathing is much better but he still has significant pain as he is on chronic opioid therapy at home that he reports has been titrated down. Discussed with patient plan to monitor 24 hours TDD opioid medications in order to plan for discharge. Patient states he wants to decrease his opioid use and recognizes this as a problem. Discussed with Dr. Kessler case will be signed out to hospitalist team.    9/3 : pt seen and examined at bedside, doing well. No complaints. off sedation will be t/f to floor. Reports having bowel movements.     9/4: Pt seen and examined bedside. Doing much better, OOB/walking around, no SOB. Denies any complaints at this time.      PHYSICAL EXAM:    GENERAL: NAD, well-groomed, well-developed  HEAD:  NC/AT  EYES: EOMI, no scleral icterus  CNS:  Alert & Oriented X3, mildly tremulous, otherwise non-focal  LUNG: Clear to auscultation bilaterally; No rales, rhonchi, wheezing, or rubs  HEART: RRR; No murmurs, rubs, or gallops  ABDOMEN: soft, non-tender, + distension (baseline)  EXTREMITIES:  2+ Peripheral Pulses      Vital Signs Last 24 Hrs  T(C): 36.9 (09-04-17 @ 05:15)  T(F): 98.4 (09-04-17 @ 05:15), Max: 98.9 (09-03-17 @ 14:53)  HR: 81 (09-04-17 @ 13:54) (73 - 130)  BP: 143/83 (09-04-17 @ 05:15)  BP(mean): 98 (09-04-17 @ 05:15) (98 - 98)  RR: 17 (09-03-17 @ 17:03) (17 - 18)  SpO2: 100% (09-04-17 @ 05:15) (100% - 100%)  Wt(kg): --    09-03 @ 07:01  -  09-04 @ 07:00  --------------------------------------------------------  IN: 240 mL / OUT: 300 mL / NET: -60 mL    Lab Results:   Differential:	[] Automated		[] Manual    09-04    138  |  100  |  14  ----------------------------<  93  3.6   |  32<H>  |  0.76    Ca    9.3      04 Sep 2017 05:08

## 2017-09-07 NOTE — PROGRESS NOTE ADULT - ASSESSMENT
61M admitted w/ AMS and SOB found to be in acute hypercarbic respiratory failure. Pt significantly improved from baseline, is AOx3, OOB/ambulating, using BiPAP and tolerating it well. Starting DC planning for 9/5.
. Patient with pulmonary htn  1. co2 better, will take off bipap during day use bipap at night  2. GERALD baseline abg pco2 69  3  with lactulose ammonia level better, continue, liver sono normal.    4.  fever, check cxr , cultures  5. agitation may be withdrawal will continue morphine, precedex
1. Patient with pulmonary htn  2. GERALD baseline abg pco2 69  3. has not used bipap in past  4. inc ammonia - will get sono      will give lactulose for hepatic encephalopathy
60 y/o M, chief complaint of weakness and SOB found to be in acute hypercarbic respiratory failure
60 y/o M, chief complaint of weakness and SOB found to be in acute hypercarbic respiratory failure
61M admitted w/ AMS and SOB found to be in acute hypercarbic respiratory failure. Pt significantly improved from baseline, is AOx3, OOB/ambulating, using BiPAP and tolerating it well. Starting DC planning for 9/5.
61M admitted w/ AMS and SOB found to be in acute hypercarbic respiratory failure. Pt significantly improved from baseline, is AOx3, OOB/ambulating, using BiPAP and tolerating it well. Starting DC planning for 9/6 pending Trilogy arranged for him to go home with. Patient needs Trilogy to prevent recurrence of hypercapnic respiratory failure and possibly death.
61M admitted w/ AMS and SOB found to be in acute hypercarbic respiratory failure. Pt significantly improved from baseline, is AOx3, OOB/ambulating, using BiPAP and tolerating it well. Starting DC planning for 9/7 pending Trilogy arranged for him to go home with. Patient needs Trilogy to prevent recurrence of hypercapnic respiratory failure and possibly death. Discussed with  Natividad, Trilogy is set up and will be delivered to his home today 9/7.
61M.  hx chronic LBP due to OA-has been on long acting  opioid  > 10 years. asthma, HTN, pulmonary HTN on home O2 3 L, GERALD, anxiety, spinal stenosis, herniated disks x2, arthritis, s/p hip replacement and spinal surgery presents to the ED c/o weakness. His son states he has fallen 4 times in past 24 hours because his left leg felt weak.  No syncope, LOC, did not hit head, no pain from fall.  Pt denies fever, HA, CP, abd pain.  He was previously admitted to the hospital for 5 days 8/22- 8/26/17 due to SOB and panic attack found to be in opiate withdrawal and had cardiac cath for persistent SOB which showed pulmonary HTN,EF65%, discharged 2 days ago . He states he feels very shaky and his left leg feels weak.  He takes morphine for arthritic pain.  Questionable whether extra doses were taken for pain.  As per son: pt is not acting at baseline.  Pt appears less alert, more confused, poorly able to describe his condition.  Pt poor historian, history obtained via pt and his son.   In ED utox positive for opiate and benzo which are his long term meds, serum ammonia 53  CXR- poor inspiratory film   EKG sinus rhythm with sinus arrhythmia nonspecific ST/T waves, , troponin x2 negative  Head CT- no acute pathology  Was given lactulose and morphine in ED  metabolic encephalopthy is multifactorial and includes CO2 narcosis discussed with team as well as narcotics  limit o2 supplementa given comples issues and CO2 retenstion  time spent taking care of critically ill pt 50 mins  ICU consult appropriate    pt's status is reveiwed with his son at bedside and incraesed morbidity and mortality associated with his condition also rev in details in person.  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
61M.  hx chronic LBP due to OA-has been on long acting  opioid  > 10 years. asthma, HTN, pulmonary HTN on home O2 3 L, GERALD, anxiety, spinal stenosis, herniated disks x2, arthritis, s/p hip replacement and spinal surgery presents to the ED c/o weakness. His son states he has fallen 4 times in past 24 hours because his left leg felt weak.  No syncope, LOC, did not hit head, no pain from fall.  Pt denies fever, HA, CP, abd pain.  He was previously admitted to the hospital for 5 days 8/22- 8/26/17 due to SOB and panic attack found to be in opiate withdrawal and had cardiac cath for persistent SOB which showed pulmonary HTN,EF65%, discharged 2 days ago . He states he feels very shaky and his left leg feels weak.  He takes morphine for arthritic pain.  Questionable whether extra doses were taken for pain.  As per son: pt is not acting at baseline.  Pt appears less alert, more confused, poorly able to describe his condition.  Pt poor historian, history obtained via pt and his son.   In ED utox positive for opiate and benzo which are his long term meds, serum ammonia 53  CXR- poor inspiratory film   EKG sinus rhythm with sinus arrhythmia nonspecific ST/T waves, , troponin x2 negative  Head CT- no acute pathology  Was given lactulose and morphine in ED  metabolic encephalopthy is multifactorial and includes CO2 narcosis discussed with team as well as narcotics  limit o2 supplementa given comples issues and CO2 retenstion  time spent taking care of critically ill pt 50 mins  ICU consult appropriate    pt's status is reveiwed with his son at bedside and incraesed morbidity and mortality associated with his condition also rev in details in person.  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
61M.  hx chronic LBP due to OA-has been on long acting  opioid  > 10 years. asthma, HTN, pulmonary HTN on home O2 3 L, GERALD, anxiety, spinal stenosis, herniated disks x2, arthritis, s/p hip replacement and spinal surgery presents to the ED c/o weakness. His son states he has fallen 4 times in past 24 hours because his left leg felt weak.  No syncope, LOC, did not hit head, no pain from fall.  Pt denies fever, HA, CP, abd pain.  He was previously admitted to the hospital for 5 days 8/22- 8/26/17 due to SOB and panic attack found to be in opiate withdrawal and had cardiac cath for persistent SOB which showed pulmonary HTN,EF65%, discharged 2 days ago . He states he feels very shaky and his left leg feels weak.  He takes morphine for arthritic pain.  Questionable whether extra doses were taken for pain.  As per son: pt is not acting at baseline.  Pt appears less alert, more confused, poorly able to describe his condition.  Pt poor historian, history obtained via pt and his son.   In ED utox positive for opiate and benzo which are his long term meds, serum ammonia 53  CXR- poor inspiratory film   EKG sinus rhythm with sinus arrhythmia nonspecific ST/T waves, , troponin x2 negative  Head CT- no acute pathology  Was given lactulose and morphine in ED  metabolic encephalopthy is multifactorial and includes CO2 narcosis discussed with team as well as narcotics  limit o2 supplementa given comples issues and CO2 retenstion  time spent taking care of critically ill pt 50 mins  ICU consult appropriate    pt's status is reveiwed with his son at bedside and incraesed morbidity and mortality associated with his condition also rev in details in person.  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
61M.  hx chronic LBP due to OA-has been on long acting  opioid  > 10 years. asthma, HTN, pulmonary HTN on home O2 3 L, GERALD, anxiety, spinal stenosis, herniated disks x2, arthritis, s/p hip replacement and spinal surgery presents to the ED c/o weakness. His son states he has fallen 4 times in past 24 hours because his left leg felt weak.  No syncope, LOC, did not hit head, no pain from fall.  Pt denies fever, HA, CP, abd pain.  He was previously admitted to the hospital for 5 days 8/22- 8/26/17 due to SOB and panic attack found to be in opiate withdrawal and had cardiac cath for persistent SOB which showed pulmonary HTN,EF65%, discharged 2 days ago . He states he feels very shaky and his left leg feels weak.  He takes morphine for arthritic pain.  Questionable whether extra doses were taken for pain.  As per son: pt is not acting at baseline.  Pt appears less alert, more confused, poorly able to describe his condition.  Pt poor historian, history obtained via pt and his son.   In ED utox positive for opiate and benzo which are his long term meds, serum ammonia 53  CXR- poor inspiratory film   EKG sinus rhythm with sinus arrhythmia nonspecific ST/T waves, , troponin x2 negative  Head CT- no acute pathology  Was given lactulose and morphine in ED  metabolic encephalopthy is multifactorial and includes CO2 narcosis discussed with team as well as narcotics  limit o2 supplementa given comples issues and CO2 retenstion  time spent taking care of critically ill pt 50 mins  ICU consult appropriate    pt's status is reveiwed with his son at bedside and incraesed morbidity and mortality associated with his condition also rev in details in person.  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  severe pulm HTN  s/p cath , pls see data attached  ABG improved initlayy and now worsened status  metabolik / resp alkalosis  elevated Pco2 , baseline 55-60  DC BIPAP and monitor resp statuis  ABG now improvbed and clinicallybetter  use narcotics and sedatives with precautions given GERALD / OHS and hypercapnic state  data revwied and discussed with CCU staff today 9/1/2017  avoid seadtives  Nocturnal BIPAP failed with worsened hypercapnea  diuresis as tolerated  Fu needed as outpt with Dr Loepz-pt has information    needs trilogy with worsened hypercapnic state and inadequate response to BIPAP  last ABG 7.36 / 56 / 92 / 31 / 97%  Due to pt's progressive dz and chronic hypercapnic resp failure with COPD and OHS, there is indication for nocturnal ventilation with Trilogy. BIPAP was tried and failed due to ineffective rx. Without NIV, his condition can deteriorate and end up back in the hospital and he can die of resp failure. this is all discussed with him with face to face contact done. he agrees to proceed.
61M.  hx chronic LBP due to OA-has been on long acting  opioid  > 10 years. asthma, HTN, pulmonary HTN on home O2 3 L, GERALD, anxiety, spinal stenosis, herniated disks x2, arthritis, s/p hip replacement and spinal surgery presents to the ED c/o weakness. His son states he has fallen 4 times in past 24 hours because his left leg felt weak.  No syncope, LOC, did not hit head, no pain from fall.  Pt denies fever, HA, CP, abd pain.  He was previously admitted to the hospital for 5 days 8/22- 8/26/17 due to SOB and panic attack found to be in opiate withdrawal and had cardiac cath for persistent SOB which showed pulmonary HTN,EF65%, discharged 2 days ago . He states he feels very shaky and his left leg feels weak.  He takes morphine for arthritic pain.  Questionable whether extra doses were taken for pain.  As per son: pt is not acting at baseline.  Pt appears less alert, more confused, poorly able to describe his condition.  Pt poor historian, history obtained via pt and his son.   In ED utox positive for opiate and benzo which are his long term meds, serum ammonia 53  CXR- poor inspiratory film   EKG sinus rhythm with sinus arrhythmia nonspecific ST/T waves, , troponin x2 negative  Head CT- no acute pathology  Was given lactulose and morphine in ED  metabolic encephalopthy is multifactorial and includes CO2 narcosis discussed with team as well as narcotics  limit o2 supplementa given comples issues and CO2 retenstion  time spent taking care of critically ill pt 50 mins  ICU consult appropriate    pt's status is reveiwed with his son at bedside and incraesed morbidity and mortality associated with his condition also rev in details in person.  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  severe pulm HTN  s/p cath , pls see data attached  ABG improved initlayy and now worsened status  metabolik / resp alkalosis  elevated Pco2 , baseline 55-60  DC BIPAP and monitor resp statuis  ABG now improvbed and clinicallybetter  use narcotics and sedatives with precautions given GERALD / OHS and hypercapnic state  data revwied and discussed with CCU staff today 9/1/2017  avoid seadtives  Nocturnal BIPAP failed with worsened hypercapnea  diuresis as tolerated  Fu needed as outpt with Dr Lopez-pt has information    needs trilogy with worsened hypercapnic state and inadequate response to BIPAP  last ABG 7.36 / 56 / 92 / 31 / 97%  Due to pt's progressive dz and chronic hypercapnic resp failure with COPD and OHS, there is indication for nocturnal ventilation with Trilogy. BIPAP was tried and failed due to ineffective rx. Without NIV, his condition can deteriorate and end up back in the hospital and he can die of resp failure. this is all discussed with him with face to face contact done. he agrees to proceed.
61M.  hx chronic LBP due to OA-has been on long acting  opioid  > 10 years. asthma, HTN, pulmonary HTN on home O2 3 L, GERALD, anxiety, spinal stenosis, herniated disks x2, arthritis, s/p hip replacement and spinal surgery presents to the ED c/o weakness. His son states he has fallen 4 times in past 24 hours because his left leg felt weak.  No syncope, LOC, did not hit head, no pain from fall.  Pt denies fever, HA, CP, abd pain.  He was previously admitted to the hospital for 5 days 8/22- 8/26/17 due to SOB and panic attack found to be in opiate withdrawal and had cardiac cath for persistent SOB which showed pulmonary HTN,EF65%, discharged 2 days ago . He states he feels very shaky and his left leg feels weak.  He takes morphine for arthritic pain.  Questionable whether extra doses were taken for pain.  As per son: pt is not acting at baseline.  Pt appears less alert, more confused, poorly able to describe his condition.  Pt poor historian, history obtained via pt and his son.   In ED utox positive for opiate and benzo which are his long term meds, serum ammonia 53  CXR- poor inspiratory film   EKG sinus rhythm with sinus arrhythmia nonspecific ST/T waves, , troponin x2 negative  Head CT- no acute pathology  Was given lactulose and morphine in ED  metabolic encephalopthy is multifactorial and includes CO2 narcosis discussed with team as well as narcotics  limit o2 supplementa given comples issues and CO2 retenstion  time spent taking care of critically ill pt 50 mins  ICU consult appropriate    pt's status is reveiwed with his son at bedside and incraesed morbidity and mortality associated with his condition also rev in details in person.  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  severe pulm HTN  s/p cath , pls see data attached  ABG improved initlayy and now worsened status  metabolik / resp alkalosis  elevated Pco2 , baseline 55-60  DC BIPAP and monitor resp statuis  ABG now improvbed and clinicallybetter  use narcotics and sedatives with precautions given GERALD / OHS and hypercapnic state  data revwied and discussed with CCU staff today 9/1/2017  avoid seadtives  Nocturnal BIPAP failed with worsened hypercapnea  diuresis as tolerated  Fu needed as outpt with Dr Lopez-pt has information    needs trilogy with worsened hypercapnic state and inadequate response to BIPAP  last ABG 7.36 / 56 / 92 / 31 / 97%  Due to pt's progressive dz and chronic hypercapnic resp failure with COPD and OHS, there is indication for nocturnal ventilation with Trilogy. BIPAP was tried and failed due to ineffective rx. Without NIV, his condition can deteriorate and end up back in the hospital and he can die of resp failure. this is all discussed with him with face to face contact done. he agrees to proceed.    plan for DC with trilogy  at home for use  feels great  very thankful; for care he received at this hospital
61M.  hx chronic LBP due to OA-has been on long acting  opioid  > 10 years. asthma, HTN, pulmonary HTN on home O2 3 L, GERALD, anxiety, spinal stenosis, herniated disks x2, arthritis, s/p hip replacement and spinal surgery presents to the ED c/o weakness/confusion/recurrent falls x4 in past 24 hrs  due to ? LLE weakness which has now resolved ..     Assessment/plan       -Admit to Medicine  -ABG, slow  cautious IV hydration if ammonia has not improved with lactulose  -CTabd pelvis or abd US routine r/o chronic  liver disease (no clinical signs of acute biliary patholgy or infection,no abdominal pain)  -Iv ceftraixoen x1 given in ED,defer further abx for now unless develops  fever or WBC rises or clinical infection signs ,f/u blood and urine cx  - hold long acting morphine ,will do IV morphine prn   -Xanax prn           #Recurrent falls in past 24 hrs /?LLE weakness which resolved  R/O TIA  - Tele admission  -JUDD,asa,lipid profile   -neuro consult  -cardio consult    #hx of Pulmonary HTN /chronic right sided heart failure/? chronic diastolic heart failure/Asthma  No clinical evidence of acute decompensated heart failure ( wnl for patient;s age3  - continue asa,losartan, will hold lasix for now due to possibility of dehydration  -neb prn     # chronic lower back pain /chronic anxiety  -will hold long acting morphine for now and give morphine prn due to possible overdosing of home  opiate  -Xanax prn      # DVt prophylaxis-  lovenox Sc      Criticare    I was called by nurse with critical value ABG PH 7.25,CO2 101  patient was seen and evaluated immediately  no chnage in mentation as compared to above.patient awake ,alert oriented x3,but intermittently forgetful  lungs occasional rhonchi  patient not somnolent or lethargic but still has asterix    assessemnt added to above  #Acute on chronic hypercapneic respiratory failure /secondary to hypoventilation syndrome/GERALD/   with underlying COPD vs asthma  without acute exacerbation at this time   Doubt acute CHF /unlikely pneumonia    #will start BIPAP I/E 12/5 FiO2 40% keep puls ox between 94 and 97  #neb tx ,inhaled steroids   # repeat ABG after 2 hours of BIPAP  # pulmonary consult      #Acute metabolic encephalopathy multifactorial  secondary to acute on chronic hypercapneic respiratory failure opiate/xanax use /dehydration/hyperammonemia  same managemnt as above
62 y/o M, chief complaint of weakness and SOB found to be in acute hypercarbic respiratory failure
Patient with pulmonary htn  1. co2 better, continue bipap at night  2. GERALD    3  with lactulose ammonia level better, liver sono normal dec dose lactulose etiology hyperammoniemia better  4.  fever, better, no infiltrate on cxr  5. agitation may be withdrawal will continue morphine at lower dose than admission      may be transferred to floor
Patient with pulmonary htn  1. co2 better, will take off bipap during day use bipap at night  2. GERALD baseline abg pco2 69, likely back to baseline  3  with lactulose ammonia level better, continue lactulose, liver sono normal.    4.  fever, better, no infiltrate on cxr  5. agitation may be withdrawal will continue morphine, titrate off precedex
Will continue to monitor patient's care and resume care as patient downgraded from CCU.

## 2017-09-07 NOTE — PROGRESS NOTE ADULT - PROBLEM SELECTOR PLAN 3
-BIPAP as above
-BIPAP at night and as needed  -will need home BIPAP/ CPAP at night
-BIPAP at night and as needed  -will need home BIPAP/ CPAP at night
-BIPAP at night and as needed  -will need home BIPAP/ CPAP at night   has not tolerated in the past ,due to anxiety, more willing now. Needs CPAP titration.
-BIPAP at night and as needed  -will need home BIPAP/ CPAP at night  Needs CPAP titration.
-BIPAP at night and as needed  -will need home Trilogy
-BIPAP at night and as needed  -will need home Trilogy
-BIPAP at night and as needed  -will need home Trilogy (arranged as of 9/7)
-BIPAP as above

## 2017-09-07 NOTE — PROGRESS NOTE ADULT - PROBLEM SELECTOR PLAN 2
-patient will likely need BIPAP device for home
-patient will likely need BIPAP device for home  -patient hemodynamically stable as of now
-patient will likely need BIPAP device for home  Pulm recommended Trilogy, will follow w/ SW regarding details  -patient hemodynamically stable as of now
-patient will need NIV device for home  Pulm recommended Trilogy, will follow w/ SW regarding details  -patient hemodynamically stable as of now
-patient will need NIV device for home (arranged as of 9/7)  Pulm recommended Trilogy, will follow w/ SW regarding details  -patient hemodynamically stable as of now
History of pulmonary hypertension noted: on 3L home O2  -O2 supplementation PRN with intermittent BIPAP as needed  -patient on 3L home O2  -patient will likely need BIPAP device for home

## 2017-09-12 DIAGNOSIS — Z79.891 LONG TERM (CURRENT) USE OF OPIATE ANALGESIC: ICD-10-CM

## 2017-09-12 DIAGNOSIS — Z91.81 HISTORY OF FALLING: ICD-10-CM

## 2017-09-12 DIAGNOSIS — F41.9 ANXIETY DISORDER, UNSPECIFIED: ICD-10-CM

## 2017-09-12 DIAGNOSIS — M54.5 LOW BACK PAIN: ICD-10-CM

## 2017-09-12 DIAGNOSIS — R45.1 RESTLESSNESS AND AGITATION: ICD-10-CM

## 2017-09-12 DIAGNOSIS — G93.41 METABOLIC ENCEPHALOPATHY: ICD-10-CM

## 2017-09-12 DIAGNOSIS — J44.9 CHRONIC OBSTRUCTIVE PULMONARY DISEASE, UNSPECIFIED: ICD-10-CM

## 2017-09-12 DIAGNOSIS — Z96.643 PRESENCE OF ARTIFICIAL HIP JOINT, BILATERAL: ICD-10-CM

## 2017-09-12 DIAGNOSIS — Z99.81 DEPENDENCE ON SUPPLEMENTAL OXYGEN: ICD-10-CM

## 2017-09-12 DIAGNOSIS — J45.901 UNSPECIFIED ASTHMA WITH (ACUTE) EXACERBATION: ICD-10-CM

## 2017-09-12 DIAGNOSIS — M15.9 POLYOSTEOARTHRITIS, UNSPECIFIED: ICD-10-CM

## 2017-09-12 DIAGNOSIS — I27.2 OTHER SECONDARY PULMONARY HYPERTENSION: ICD-10-CM

## 2017-09-12 DIAGNOSIS — I08.1 RHEUMATIC DISORDERS OF BOTH MITRAL AND TRICUSPID VALVES: ICD-10-CM

## 2017-09-12 DIAGNOSIS — I50.32 CHRONIC DIASTOLIC (CONGESTIVE) HEART FAILURE: ICD-10-CM

## 2017-09-12 DIAGNOSIS — Z79.82 LONG TERM (CURRENT) USE OF ASPIRIN: ICD-10-CM

## 2017-09-12 DIAGNOSIS — F11.23 OPIOID DEPENDENCE WITH WITHDRAWAL: ICD-10-CM

## 2017-09-12 DIAGNOSIS — E72.20 DISORDER OF UREA CYCLE METABOLISM, UNSPECIFIED: ICD-10-CM

## 2017-09-12 DIAGNOSIS — R53.1 WEAKNESS: ICD-10-CM

## 2017-09-12 DIAGNOSIS — J96.22 ACUTE AND CHRONIC RESPIRATORY FAILURE WITH HYPERCAPNIA: ICD-10-CM

## 2017-09-12 DIAGNOSIS — I11.0 HYPERTENSIVE HEART DISEASE WITH HEART FAILURE: ICD-10-CM

## 2017-09-12 DIAGNOSIS — E66.9 OBESITY, UNSPECIFIED: ICD-10-CM

## 2017-09-12 DIAGNOSIS — R41.0 DISORIENTATION, UNSPECIFIED: ICD-10-CM

## 2017-09-12 DIAGNOSIS — G47.33 OBSTRUCTIVE SLEEP APNEA (ADULT) (PEDIATRIC): ICD-10-CM

## 2018-04-14 NOTE — ED PROVIDER NOTE - TEMPLATE
Neuro
Depression    Depression    Hemorrhoids    HLD (hyperlipidemia)    HTN (hypertension)    Hyperlipidemia

## 2019-05-22 NOTE — ED ADULT NURSE NOTE - ATTEMPT TO OOB
supportive care Appreciate nephrology  ?2' hypertensive renal disease?  Monitor UOP  Avoid IV contrast, NSAIDS, bactrim no

## 2023-04-25 NOTE — BEHAVIORAL HEALTH ASSESSMENT NOTE - REMOTE MEMORY
Pt is well appearing walking with steady gait, stable for discharge and follow up without fail with medical doctor. I had a detailed discussion with the patient and/or guardian regarding the historical points, exam findings, and any diagnostic results supporting the discharge diagnosis. Pt educated on care and need for follow up. Strict return instructions and red flag signs and symptoms discussed with patient. Questions answered. Pt shows understanding of discharge information and agrees to follow. Normal

## 2023-10-25 NOTE — PATIENT PROFILE ADULT. - FALL HARM RISK CONCLUSION
AFTER VISIT INSTRUCTIONS    Below are important information to help you navigate your healthcare needs and help us serve you safely and effectively:  If laboratory tests and/or imaging studies were ordered, remember to go get them done as instructed.  If new prescriptions and/or refills were sent, remember to go pick them up from your local pharmacy, or call the specialty pharmacy to request shipment.  Always take your prescription medications exactly as prescribed unless instructed otherwise.  Note that antirheumatic drugs and steroids are immunosuppressive which means they increase your risk of infections and have multiple potential adverse effects on various organ systems in your body, though most of them are uncommon.  It is important that you are up-to-date on age-appropriate immunizations, particularly shingles and bacterial/viral pneumonia vaccines, which you can request from me or your primary care provider.  Be sure to read the drug package inserts to learn about the potential side effects of your medications before you start taking them.  If you experience any significant drug side effects, stop taking the medication and notify me promptly, and depending on the severity of the side effects, consider going to an urgent care or emergency department for immediate attention.  If there are significant findings on your lab tests and imaging studies that warrant further action, I will notify you with explanations via [email or web address] or phone call, otherwise you can view them on [email or web address] and let me know if you have any questions.  Note that [email or web address] messages are typically read during office hours and may take 1-7 business days before a response depending on the urgency of the situation and how busy my clinic schedule is.  In general, [email or web address] messaging is for non-urgent matters that do not require immediate attention, so for urgent matters that cannot wait, you are advised to go to an urgent care.  Lastly, you are granted  Maryt access to my documentation of your visit and are encouraged to read my note which details my assessment and plan for your condition.  To learn more about your condition and rheumatic diseases evaluated and treated by rheumatologists, as well as gain access to many helpful resources about these diseases, visit our website at [email or web address].   Fall with Harm Risk